# Patient Record
Sex: MALE | Race: WHITE | NOT HISPANIC OR LATINO
[De-identification: names, ages, dates, MRNs, and addresses within clinical notes are randomized per-mention and may not be internally consistent; named-entity substitution may affect disease eponyms.]

---

## 2017-02-13 ENCOUNTER — APPOINTMENT (OUTPATIENT)
Dept: FAMILY MEDICINE | Facility: CLINIC | Age: 82
End: 2017-02-13

## 2017-02-13 VITALS
DIASTOLIC BLOOD PRESSURE: 70 MMHG | BODY MASS INDEX: 25.71 KG/M2 | WEIGHT: 160 LBS | HEART RATE: 76 BPM | OXYGEN SATURATION: 96 % | SYSTOLIC BLOOD PRESSURE: 120 MMHG | HEIGHT: 66 IN | RESPIRATION RATE: 14 BRPM

## 2017-02-14 LAB
ALBUMIN SERPL ELPH-MCNC: 3.7 G/DL
ALP BLD-CCNC: 106 U/L
ALT SERPL-CCNC: 21 U/L
ANION GAP SERPL CALC-SCNC: 11 MMOL/L
AST SERPL-CCNC: 21 U/L
BASOPHILS # BLD AUTO: 0.02 K/UL
BASOPHILS NFR BLD AUTO: 0.3 %
BILIRUB SERPL-MCNC: 0.6 MG/DL
BUN SERPL-MCNC: 20 MG/DL
CALCIUM SERPL-MCNC: 10.1 MG/DL
CHLORIDE SERPL-SCNC: 107 MMOL/L
CHOLEST SERPL-MCNC: 94 MG/DL
CHOLEST/HDLC SERPL: 2 RATIO
CO2 SERPL-SCNC: 24 MMOL/L
CREAT SERPL-MCNC: 1.65 MG/DL
EOSINOPHIL # BLD AUTO: 0.13 K/UL
EOSINOPHIL NFR BLD AUTO: 1.7 %
GLUCOSE SERPL-MCNC: 84 MG/DL
HCT VFR BLD CALC: 39.6 %
HDLC SERPL-MCNC: 48 MG/DL
HGB BLD-MCNC: 12.3 G/DL
IMM GRANULOCYTES NFR BLD AUTO: 0.3 %
LDLC SERPL CALC-MCNC: 31 MG/DL
LYMPHOCYTES # BLD AUTO: 1.89 K/UL
LYMPHOCYTES NFR BLD AUTO: 24.5 %
MAN DIFF?: NORMAL
MCHC RBC-ENTMCNC: 30.1 PG
MCHC RBC-ENTMCNC: 31.1 GM/DL
MCV RBC AUTO: 97.1 FL
MONOCYTES # BLD AUTO: 0.8 K/UL
MONOCYTES NFR BLD AUTO: 10.4 %
NEUTROPHILS # BLD AUTO: 4.86 K/UL
NEUTROPHILS NFR BLD AUTO: 62.8 %
PLATELET # BLD AUTO: 242 K/UL
POTASSIUM SERPL-SCNC: 4.4 MMOL/L
PROT SERPL-MCNC: 5.9 G/DL
RBC # BLD: 4.08 M/UL
RBC # FLD: 13.2 %
SODIUM SERPL-SCNC: 142 MMOL/L
TRIGL SERPL-MCNC: 74 MG/DL
WBC # FLD AUTO: 7.72 K/UL

## 2017-02-15 LAB — HBA1C MFR BLD HPLC: 5.5 %

## 2017-03-09 ENCOUNTER — RX RENEWAL (OUTPATIENT)
Age: 82
End: 2017-03-09

## 2017-03-11 ENCOUNTER — RX RENEWAL (OUTPATIENT)
Age: 82
End: 2017-03-11

## 2017-04-26 ENCOUNTER — RX RENEWAL (OUTPATIENT)
Age: 82
End: 2017-04-26

## 2017-05-15 ENCOUNTER — APPOINTMENT (OUTPATIENT)
Dept: FAMILY MEDICINE | Facility: CLINIC | Age: 82
End: 2017-05-15

## 2017-05-15 VITALS
RESPIRATION RATE: 16 BRPM | WEIGHT: 160 LBS | SYSTOLIC BLOOD PRESSURE: 120 MMHG | DIASTOLIC BLOOD PRESSURE: 70 MMHG | TEMPERATURE: 98.3 F | HEIGHT: 66 IN | HEART RATE: 64 BPM | OXYGEN SATURATION: 98 % | BODY MASS INDEX: 25.71 KG/M2

## 2017-05-15 DIAGNOSIS — Z87.891 PERSONAL HISTORY OF NICOTINE DEPENDENCE: ICD-10-CM

## 2017-06-07 ENCOUNTER — RX RENEWAL (OUTPATIENT)
Age: 82
End: 2017-06-07

## 2017-06-26 ENCOUNTER — RX RENEWAL (OUTPATIENT)
Age: 82
End: 2017-06-26

## 2017-08-21 ENCOUNTER — APPOINTMENT (OUTPATIENT)
Dept: FAMILY MEDICINE | Facility: CLINIC | Age: 82
End: 2017-08-21
Payer: MEDICARE

## 2017-08-21 VITALS
BODY MASS INDEX: 24.59 KG/M2 | HEART RATE: 66 BPM | HEIGHT: 66 IN | DIASTOLIC BLOOD PRESSURE: 70 MMHG | WEIGHT: 153 LBS | OXYGEN SATURATION: 98 % | SYSTOLIC BLOOD PRESSURE: 120 MMHG | RESPIRATION RATE: 14 BRPM

## 2017-08-21 PROCEDURE — 36415 COLL VENOUS BLD VENIPUNCTURE: CPT

## 2017-08-21 PROCEDURE — 99214 OFFICE O/P EST MOD 30 MIN: CPT | Mod: 25

## 2017-08-22 LAB
ALBUMIN SERPL ELPH-MCNC: 4.4 G/DL
ALP BLD-CCNC: 110 U/L
ALT SERPL-CCNC: 13 U/L
ANION GAP SERPL CALC-SCNC: 14 MMOL/L
APPEARANCE: CLEAR
AST SERPL-CCNC: 18 U/L
BACTERIA: NEGATIVE
BASOPHILS # BLD AUTO: 0.02 K/UL
BASOPHILS NFR BLD AUTO: 0.3 %
BILIRUB SERPL-MCNC: 0.6 MG/DL
BILIRUBIN URINE: NEGATIVE
BLOOD URINE: NEGATIVE
BUN SERPL-MCNC: 20 MG/DL
CALCIUM SERPL-MCNC: 10.5 MG/DL
CALCIUM SERPL-MCNC: 10.5 MG/DL
CHLORIDE SERPL-SCNC: 103 MMOL/L
CHOLEST SERPL-MCNC: 104 MG/DL
CHOLEST/HDLC SERPL: 1.8 RATIO
CK SERPL-CCNC: 35 U/L
CO2 SERPL-SCNC: 24 MMOL/L
COLOR: ABNORMAL
CREAT SERPL-MCNC: 1.93 MG/DL
CREAT SPEC-SCNC: 176 MG/DL
EOSINOPHIL # BLD AUTO: 0.07 K/UL
EOSINOPHIL NFR BLD AUTO: 0.9 %
GLUCOSE QUALITATIVE U: NORMAL MG/DL
GLUCOSE SERPL-MCNC: 97 MG/DL
HBA1C MFR BLD HPLC: 5.2 %
HCT VFR BLD CALC: 38.1 %
HDLC SERPL-MCNC: 57 MG/DL
HGB BLD-MCNC: 12.5 G/DL
HYALINE CASTS: 1 /LPF
IMM GRANULOCYTES NFR BLD AUTO: 0.1 %
KETONES URINE: NEGATIVE
LDLC SERPL CALC-MCNC: 29 MG/DL
LEUKOCYTE ESTERASE URINE: ABNORMAL
LYMPHOCYTES # BLD AUTO: 1.6 K/UL
LYMPHOCYTES NFR BLD AUTO: 20.6 %
MAN DIFF?: NORMAL
MCHC RBC-ENTMCNC: 31.3 PG
MCHC RBC-ENTMCNC: 32.8 GM/DL
MCV RBC AUTO: 95.5 FL
MICROALBUMIN 24H UR DL<=1MG/L-MCNC: 2 MG/DL
MICROALBUMIN/CREAT 24H UR-RTO: 11 MG/G
MICROSCOPIC-UA: NORMAL
MONOCYTES # BLD AUTO: 0.85 K/UL
MONOCYTES NFR BLD AUTO: 11 %
NEUTROPHILS # BLD AUTO: 5.2 K/UL
NEUTROPHILS NFR BLD AUTO: 67.1 %
NITRITE URINE: NEGATIVE
PARATHYROID HORMONE INTACT: 131 PG/ML
PH URINE: 6.5
PLATELET # BLD AUTO: 230 K/UL
POTASSIUM SERPL-SCNC: 4.3 MMOL/L
PROT SERPL-MCNC: 6.7 G/DL
PROTEIN URINE: NEGATIVE MG/DL
RBC # BLD: 3.99 M/UL
RBC # FLD: 12.8 %
RED BLOOD CELLS URINE: 4 /HPF
SODIUM SERPL-SCNC: 141 MMOL/L
SPECIFIC GRAVITY URINE: 1.02
SQUAMOUS EPITHELIAL CELLS: 1 /HPF
TRIGL SERPL-MCNC: 91 MG/DL
UROBILINOGEN URINE: NORMAL MG/DL
WBC # FLD AUTO: 7.75 K/UL
WHITE BLOOD CELLS URINE: 4 /HPF

## 2017-08-23 ENCOUNTER — RX RENEWAL (OUTPATIENT)
Age: 82
End: 2017-08-23

## 2017-08-23 LAB
T4 FREE SERPL-MCNC: 0.9 NG/DL
TSH SERPL-ACNC: 1.96 UIU/ML

## 2017-10-11 ENCOUNTER — APPOINTMENT (OUTPATIENT)
Dept: FAMILY MEDICINE | Facility: CLINIC | Age: 82
End: 2017-10-11
Payer: MEDICARE

## 2017-10-11 VITALS — RESPIRATION RATE: 16 BRPM

## 2017-10-11 VITALS
WEIGHT: 152 LBS | DIASTOLIC BLOOD PRESSURE: 64 MMHG | SYSTOLIC BLOOD PRESSURE: 104 MMHG | OXYGEN SATURATION: 95 % | BODY MASS INDEX: 25.95 KG/M2 | HEIGHT: 64 IN | HEART RATE: 66 BPM

## 2017-10-11 DIAGNOSIS — Z23 ENCOUNTER FOR IMMUNIZATION: ICD-10-CM

## 2017-10-11 PROCEDURE — G0008: CPT

## 2017-10-11 PROCEDURE — 90688 IIV4 VACCINE SPLT 0.5 ML IM: CPT

## 2017-10-11 PROCEDURE — 99213 OFFICE O/P EST LOW 20 MIN: CPT | Mod: 25

## 2018-01-10 ENCOUNTER — RX RENEWAL (OUTPATIENT)
Age: 83
End: 2018-01-10

## 2018-01-19 ENCOUNTER — APPOINTMENT (OUTPATIENT)
Dept: FAMILY MEDICINE | Facility: CLINIC | Age: 83
End: 2018-01-19
Payer: MEDICARE

## 2018-01-19 VITALS
HEIGHT: 64 IN | DIASTOLIC BLOOD PRESSURE: 68 MMHG | OXYGEN SATURATION: 97 % | WEIGHT: 151 LBS | HEART RATE: 31 BPM | BODY MASS INDEX: 25.78 KG/M2 | SYSTOLIC BLOOD PRESSURE: 98 MMHG

## 2018-01-19 VITALS — RESPIRATION RATE: 14 BRPM

## 2018-01-19 PROCEDURE — 99214 OFFICE O/P EST MOD 30 MIN: CPT

## 2018-02-08 ENCOUNTER — RX RENEWAL (OUTPATIENT)
Age: 83
End: 2018-02-08

## 2018-02-25 ENCOUNTER — RX RENEWAL (OUTPATIENT)
Age: 83
End: 2018-02-25

## 2018-04-10 ENCOUNTER — MEDICATION RENEWAL (OUTPATIENT)
Age: 83
End: 2018-04-10

## 2018-04-18 ENCOUNTER — APPOINTMENT (OUTPATIENT)
Dept: FAMILY MEDICINE | Facility: CLINIC | Age: 83
End: 2018-04-18
Payer: MEDICARE

## 2018-04-18 VITALS
DIASTOLIC BLOOD PRESSURE: 70 MMHG | RESPIRATION RATE: 14 BRPM | SYSTOLIC BLOOD PRESSURE: 122 MMHG | WEIGHT: 151 LBS | HEART RATE: 70 BPM | HEIGHT: 64 IN | OXYGEN SATURATION: 97 % | BODY MASS INDEX: 25.78 KG/M2

## 2018-04-18 PROCEDURE — 99214 OFFICE O/P EST MOD 30 MIN: CPT

## 2018-05-30 ENCOUNTER — MEDICATION RENEWAL (OUTPATIENT)
Age: 83
End: 2018-05-30

## 2018-06-21 ENCOUNTER — RX RENEWAL (OUTPATIENT)
Age: 83
End: 2018-06-21

## 2018-07-11 ENCOUNTER — RX RENEWAL (OUTPATIENT)
Age: 83
End: 2018-07-11

## 2018-07-23 ENCOUNTER — APPOINTMENT (OUTPATIENT)
Dept: FAMILY MEDICINE | Facility: CLINIC | Age: 83
End: 2018-07-23
Payer: MEDICARE

## 2018-07-23 VITALS
DIASTOLIC BLOOD PRESSURE: 58 MMHG | HEART RATE: 69 BPM | BODY MASS INDEX: 25.27 KG/M2 | OXYGEN SATURATION: 94 % | HEIGHT: 64 IN | WEIGHT: 148 LBS | TEMPERATURE: 98.6 F | SYSTOLIC BLOOD PRESSURE: 112 MMHG

## 2018-07-23 VITALS — RESPIRATION RATE: 14 BRPM

## 2018-07-23 PROCEDURE — 99214 OFFICE O/P EST MOD 30 MIN: CPT

## 2018-07-23 NOTE — HISTORY OF PRESENT ILLNESS
[Hyperlipidemia] : Hyperlipidemia [Hypertension] : Hypertension [Other: ___] : [unfilled] [Spouse] : spouse [Family Member] : family member [Checks BP Sporadically] : The patient checks ~his/her~ blood pressure sporadically [<130/90] : Target blood pressure is  <130/90 [Target goal met] : BP target goal met [Doing Well] : Patient is doing well [Managed with medications] : managed with  medication [Moderate Intensity Therapy] : Patient is currently on moderate intensity statin  therapy [FreeTextEntry1] : Alzheimer no change continue present medical management, stable

## 2018-07-23 NOTE — ASSESSMENT
[FreeTextEntry1] : No change in present medical management. Detailed discussion with patient's wife and daughter. Unfortunately, the patient is not responsible for the things that he might or might not do.

## 2018-07-23 NOTE — PHYSICAL EXAM

## 2018-07-25 ENCOUNTER — RX RENEWAL (OUTPATIENT)
Age: 83
End: 2018-07-25

## 2018-10-22 ENCOUNTER — APPOINTMENT (OUTPATIENT)
Dept: FAMILY MEDICINE | Facility: CLINIC | Age: 83
End: 2018-10-22
Payer: MEDICARE

## 2018-10-22 VITALS
HEART RATE: 58 BPM | OXYGEN SATURATION: 98 % | DIASTOLIC BLOOD PRESSURE: 68 MMHG | SYSTOLIC BLOOD PRESSURE: 118 MMHG | TEMPERATURE: 97.6 F

## 2018-10-22 DIAGNOSIS — E83.52 HYPERCALCEMIA: ICD-10-CM

## 2018-10-22 DIAGNOSIS — Z00.00 ENCOUNTER FOR GENERAL ADULT MEDICAL EXAMINATION W/OUT ABNORMAL FINDINGS: ICD-10-CM

## 2018-10-22 PROCEDURE — 99214 OFFICE O/P EST MOD 30 MIN: CPT | Mod: 25

## 2018-10-22 PROCEDURE — 90732 PPSV23 VACC 2 YRS+ SUBQ/IM: CPT

## 2018-10-22 PROCEDURE — 90472 IMMUNIZATION ADMIN EACH ADD: CPT

## 2018-10-22 PROCEDURE — G0009: CPT

## 2018-10-22 PROCEDURE — 90688 IIV4 VACCINE SPLT 0.5 ML IM: CPT

## 2018-10-22 PROCEDURE — G0008: CPT

## 2018-10-22 NOTE — HISTORY OF PRESENT ILLNESS
[Spouse] : spouse [Family Member] : family member [FreeTextEntry1] : See history of present illness [de-identified] : Patient is a 89-year-old gentleman accompanied by his wife and daughter would advance in Alzheimer's. Patient in no acute distress. Medical history significant for hyperlipidemia, hypertension hypercalcemia, mild hyperparathyroidism, and hyperglycemia. Patient is in no acute distress. Awake, alert, and oriented x1-2

## 2018-10-22 NOTE — PHYSICAL EXAM

## 2018-10-22 NOTE — ASSESSMENT
[FreeTextEntry1] : Comprehensive physical exam was stable. No acute findings, comprehensive blood work obtained, influenza and Pneumovax 23, were administered.\par \par Advanced Alzheimer's. We will continue present medications and guidance from family.\par \par No change in present medical management face-to-face with the patien t25 minutes

## 2018-10-23 LAB
ALBUMIN SERPL ELPH-MCNC: 4.2 G/DL
ALP BLD-CCNC: 110 U/L
ALT SERPL-CCNC: 16 U/L
ANION GAP SERPL CALC-SCNC: 12 MMOL/L
AST SERPL-CCNC: 20 U/L
BASOPHILS # BLD AUTO: 0.01 K/UL
BASOPHILS NFR BLD AUTO: 0.1 %
BILIRUB SERPL-MCNC: 0.5 MG/DL
BUN SERPL-MCNC: 24 MG/DL
CALCIUM SERPL-MCNC: 10.3 MG/DL
CALCIUM SERPL-MCNC: 10.3 MG/DL
CHLORIDE SERPL-SCNC: 104 MMOL/L
CHOLEST SERPL-MCNC: 98 MG/DL
CHOLEST/HDLC SERPL: 1.8 RATIO
CK SERPL-CCNC: 38 U/L
CO2 SERPL-SCNC: 25 MMOL/L
CREAT SERPL-MCNC: 2 MG/DL
EOSINOPHIL # BLD AUTO: 0.09 K/UL
EOSINOPHIL NFR BLD AUTO: 1.3 %
GLUCOSE SERPL-MCNC: 74 MG/DL
HCT VFR BLD CALC: 38.1 %
HDLC SERPL-MCNC: 56 MG/DL
HGB BLD-MCNC: 11.9 G/DL
IMM GRANULOCYTES NFR BLD AUTO: 0.1 %
LDLC SERPL CALC-MCNC: 26 MG/DL
LYMPHOCYTES # BLD AUTO: 1.8 K/UL
LYMPHOCYTES NFR BLD AUTO: 25 %
MAN DIFF?: NORMAL
MCHC RBC-ENTMCNC: 30.7 PG
MCHC RBC-ENTMCNC: 31.2 GM/DL
MCV RBC AUTO: 98.2 FL
MONOCYTES # BLD AUTO: 0.73 K/UL
MONOCYTES NFR BLD AUTO: 10.1 %
NEUTROPHILS # BLD AUTO: 4.56 K/UL
NEUTROPHILS NFR BLD AUTO: 63.4 %
PARATHYROID HORMONE INTACT: 210 PG/ML
PLATELET # BLD AUTO: 243 K/UL
POTASSIUM SERPL-SCNC: 4.7 MMOL/L
PROT SERPL-MCNC: 6.5 G/DL
PSA SERPL-MCNC: 2.36 NG/ML
RBC # BLD: 3.88 M/UL
RBC # FLD: 13.3 %
SODIUM SERPL-SCNC: 141 MMOL/L
TRIGL SERPL-MCNC: 78 MG/DL
WBC # FLD AUTO: 7.2 K/UL

## 2018-10-24 LAB
HBA1C MFR BLD HPLC: 5.4 %
HCV AB SER QL: NONREACTIVE
HCV S/CO RATIO: 0.22 S/CO

## 2018-11-20 ENCOUNTER — RX RENEWAL (OUTPATIENT)
Age: 83
End: 2018-11-20

## 2018-12-20 ENCOUNTER — RX RENEWAL (OUTPATIENT)
Age: 83
End: 2018-12-20

## 2019-01-18 ENCOUNTER — APPOINTMENT (OUTPATIENT)
Dept: FAMILY MEDICINE | Facility: CLINIC | Age: 84
End: 2019-01-18
Payer: MEDICARE

## 2019-01-18 VITALS
HEART RATE: 69 BPM | BODY MASS INDEX: 25.61 KG/M2 | HEIGHT: 64 IN | SYSTOLIC BLOOD PRESSURE: 120 MMHG | WEIGHT: 150 LBS | DIASTOLIC BLOOD PRESSURE: 74 MMHG | OXYGEN SATURATION: 96 %

## 2019-01-18 VITALS — RESPIRATION RATE: 16 BRPM

## 2019-01-18 DIAGNOSIS — R73.9 HYPERGLYCEMIA, UNSPECIFIED: ICD-10-CM

## 2019-01-18 PROCEDURE — 99215 OFFICE O/P EST HI 40 MIN: CPT

## 2019-01-18 NOTE — COUNSELING
[Behavioral health counseling provided] : behavioral health  [Sleep ___ hours/day] : Sleep [unfilled] hours/day [Engage in a relaxing activity] : Engage in a relaxing activity [Plan in advance] : Plan in advance [None] : None [Needs reinforcement, provided] : Patient needs reinforcement on understanding lifestyle changes and  the steps needed to achieve self management goals and reinforcement was provided

## 2019-01-18 NOTE — PHYSICAL EXAM
[No Acute Distress] : no acute distress [Well Nourished] : well nourished [Well Developed] : well developed [Well-Appearing] : well-appearing [Normal Sclera/Conjunctiva] : normal sclera/conjunctiva [PERRL] : pupils equal round and reactive to light [EOMI] : extraocular movements intact [Normal Outer Ear/Nose] : the outer ears and nose were normal in appearance [Normal Oropharynx] : the oropharynx was normal [No JVD] : no jugular venous distention [Supple] : supple [No Lymphadenopathy] : no lymphadenopathy [Thyroid Normal, No Nodules] : the thyroid was normal and there were no nodules present [No Respiratory Distress] : no respiratory distress  [Clear to Auscultation] : lungs were clear to auscultation bilaterally [No Accessory Muscle Use] : no accessory muscle use [Normal Rate] : normal rate  [Regular Rhythm] : with a regular rhythm [Normal S1, S2] : normal S1 and S2 [No Murmur] : no murmur heard [No Carotid Bruits] : no carotid bruits [No Abdominal Bruit] : a ~M bruit was not heard ~T in the abdomen [No Varicosities] : no varicosities [Pedal Pulses Present] : the pedal pulses are present [No Edema] : there was no peripheral edema [No Extremity Clubbing/Cyanosis] : no extremity clubbing/cyanosis [No Palpable Aorta] : no palpable aorta [Soft] : abdomen soft [Non Tender] : non-tender [Non-distended] : non-distended [No Masses] : no abdominal mass palpated [No HSM] : no HSM [Normal Bowel Sounds] : normal bowel sounds [Normal Posterior Cervical Nodes] : no posterior cervical lymphadenopathy [Normal Anterior Cervical Nodes] : no anterior cervical lymphadenopathy [No CVA Tenderness] : no CVA  tenderness [No Spinal Tenderness] : no spinal tenderness [No Joint Swelling] : no joint swelling [Grossly Normal Strength/Tone] : grossly normal strength/tone [No Rash] : no rash [Normal Gait] : normal gait [No Focal Deficits] : no focal deficits [de-identified] : Shuffling gait, expressionless [de-identified] : Fine tremor. No cogwheel rigidity. No pill-rolling,Unsteady gait [de-identified] : advanced Alzheimer,no change

## 2019-01-18 NOTE — HEALTH RISK ASSESSMENT
[Patient declined bone density test] : Patient declined bone density test [Patient declined colonoscopy] : Patient declined colonoscopy [HIV test declined] : HIV test declined [Independent] : feeding [Some assistance needed] : walking [Full assistance needed] : managing finances [] : No [Two or more falls in past year] : Patient reported two or more falls in the past year [0] : 2) Feeling down, depressed, or hopeless: Not at all (0) [[id number]] : 0

## 2019-01-18 NOTE — REVIEW OF SYSTEMS
[Fatigue] : fatigue [Headache] : no headache [Dizziness] : no dizziness [Fainting] : no fainting [Confusion] : confusion [Unsteady Walk] : ataxia [Memory Loss] : memory loss [Negative] : Heme/Lymph

## 2019-01-18 NOTE — HISTORY OF PRESENT ILLNESS
[Spouse] : spouse [Family Member] : family member [FreeTextEntry1] : see HPI [de-identified] : Patient is a 89-year-old gentleman presents today for followup appointment. Patient accompanied by his daughter and spouse. Patient has advanced Alzheimer's disease. He is in no acute distress at this time. Medical history is significant for hyperlipidemia Alzheimer's, generalized anxiety, hypertension, hyperglycemia, and hyperparathyroidism. Patient also has chronic renal insufficiency. Patient is awake, alert, and oriented x1, presently in no acute distress. He is calm, cooperative, and no distress.

## 2019-01-18 NOTE — ASSESSMENT
[FreeTextEntry1] : Comprehensive physical exam was stable.Status post fall at home. No injury. Patient does have unsteady gait, shuffling gait has worsened. Patient does have  Alzheimer's and possibly with the new findings. Parkinsonism. I will hold back on starting any new medications. We will observe patient how he progresses. Discussed with family.\par \par Advanced Alzheimer's. We will continue present medications and guidance from family.\par \par Hypertension excellent blood pressure control. Continue metoprolol, heart rate 50 mg p.o. daily.\par \par Hyperlipidemia. Patient tolerating and doing well with atorvastatin 10 mg p.o. daily.\par \par Parkinsonism. Consider Sinemet\par \par MOLST filled out at today's visit 40 minutes face to face with patient and family.

## 2019-02-04 ENCOUNTER — RX RENEWAL (OUTPATIENT)
Age: 84
End: 2019-02-04

## 2019-02-18 ENCOUNTER — RX RENEWAL (OUTPATIENT)
Age: 84
End: 2019-02-18

## 2019-04-02 ENCOUNTER — APPOINTMENT (OUTPATIENT)
Dept: FAMILY MEDICINE | Facility: HOME HEALTH | Age: 84
End: 2019-04-02
Payer: MEDICARE

## 2019-04-02 PROCEDURE — 99349 HOME/RES VST EST MOD MDM 40: CPT

## 2019-04-09 ENCOUNTER — MEDICATION RENEWAL (OUTPATIENT)
Age: 84
End: 2019-04-09

## 2019-04-23 ENCOUNTER — MEDICATION RENEWAL (OUTPATIENT)
Age: 84
End: 2019-04-23

## 2019-04-26 ENCOUNTER — APPOINTMENT (OUTPATIENT)
Dept: FAMILY MEDICINE | Facility: CLINIC | Age: 84
End: 2019-04-26

## 2019-05-16 ENCOUNTER — INPATIENT (INPATIENT)
Facility: HOSPITAL | Age: 84
LOS: 7 days | Discharge: HOME | DRG: 871 | End: 2019-05-24
Attending: INTERNAL MEDICINE | Admitting: INTERNAL MEDICINE
Payer: COMMERCIAL

## 2019-05-16 VITALS
WEIGHT: 160.06 LBS | SYSTOLIC BLOOD PRESSURE: 142 MMHG | OXYGEN SATURATION: 81 % | HEIGHT: 65 IN | RESPIRATION RATE: 24 BRPM | DIASTOLIC BLOOD PRESSURE: 81 MMHG | TEMPERATURE: 98 F | HEART RATE: 85 BPM

## 2019-05-16 DIAGNOSIS — J18.9 PNEUMONIA, UNSPECIFIED ORGANISM: ICD-10-CM

## 2019-05-16 DIAGNOSIS — A41.9 SEPSIS, UNSPECIFIED ORGANISM: ICD-10-CM

## 2019-05-16 DIAGNOSIS — F03.90 UNSPECIFIED DEMENTIA WITHOUT BEHAVIORAL DISTURBANCE: ICD-10-CM

## 2019-05-16 DIAGNOSIS — S02.91XA UNSPECIFIED FRACTURE OF SKULL, INITIAL ENCOUNTER FOR CLOSED FRACTURE: ICD-10-CM

## 2019-05-16 DIAGNOSIS — I10 ESSENTIAL (PRIMARY) HYPERTENSION: ICD-10-CM

## 2019-05-16 LAB
ALBUMIN SERPL ELPH-MCNC: 3.4 G/DL — SIGNIFICANT CHANGE UP (ref 3.3–5)
ALP SERPL-CCNC: 135 U/L — HIGH (ref 40–120)
ALT FLD-CCNC: 32 U/L DA — SIGNIFICANT CHANGE UP (ref 10–45)
ANION GAP SERPL CALC-SCNC: 17 MMOL/L — SIGNIFICANT CHANGE UP (ref 5–17)
AST SERPL-CCNC: 54 U/L — HIGH (ref 10–40)
BILIRUB SERPL-MCNC: 0.6 MG/DL — SIGNIFICANT CHANGE UP (ref 0.2–1.2)
BUN SERPL-MCNC: 27 MG/DL — HIGH (ref 7–23)
CALCIUM SERPL-MCNC: 10 MG/DL — SIGNIFICANT CHANGE UP (ref 8.4–10.5)
CHLORIDE SERPL-SCNC: 103 MMOL/L — SIGNIFICANT CHANGE UP (ref 96–108)
CO2 BLDA-SCNC: 14 MMOL/L — LOW (ref 22–30)
CO2 SERPL-SCNC: 19 MMOL/L — LOW (ref 22–31)
CREAT SERPL-MCNC: 2.57 MG/DL — HIGH (ref 0.5–1.3)
GAS PNL BLDA: SIGNIFICANT CHANGE UP
GLUCOSE SERPL-MCNC: 179 MG/DL — HIGH (ref 70–99)
HCT VFR BLD CALC: 47.4 % — SIGNIFICANT CHANGE UP (ref 39–50)
HGB BLD-MCNC: 14.6 G/DL — SIGNIFICANT CHANGE UP (ref 13–17)
HOROWITZ INDEX BLDA+IHG-RTO: SIGNIFICANT CHANGE UP
LACTATE SERPL-SCNC: 10.1 MMOL/L — CRITICAL HIGH (ref 0.7–2)
LACTATE SERPL-SCNC: 7.1 MMOL/L — CRITICAL HIGH (ref 0.7–2)
LYMPHOCYTES # BLD AUTO: 2 % — LOW (ref 13–44)
MCHC RBC-ENTMCNC: 30.8 GM/DL — LOW (ref 32–36)
MCHC RBC-ENTMCNC: 31.3 PG — SIGNIFICANT CHANGE UP (ref 27–34)
MCV RBC AUTO: 101.5 FL — HIGH (ref 80–100)
MONOCYTES NFR BLD AUTO: 2 % — SIGNIFICANT CHANGE UP (ref 2–14)
NEUTROPHILS NFR BLD AUTO: 82 % — HIGH (ref 43–77)
PCO2 BLDA: 31 MMHG — LOW (ref 32–46)
PH BLDA: 7.25 — LOW (ref 7.35–7.45)
PLAT MORPH BLD: SIGNIFICANT CHANGE UP
PLATELET # BLD AUTO: 250 K/UL — SIGNIFICANT CHANGE UP (ref 150–400)
PO2 BLDA: 52 MMHG — LOW (ref 74–108)
POTASSIUM SERPL-MCNC: 4.6 MMOL/L — SIGNIFICANT CHANGE UP (ref 3.5–5.3)
POTASSIUM SERPL-SCNC: 4.6 MMOL/L — SIGNIFICANT CHANGE UP (ref 3.5–5.3)
PROT SERPL-MCNC: 6.9 G/DL — SIGNIFICANT CHANGE UP (ref 6–8.3)
RBC # BLD: 4.67 M/UL — SIGNIFICANT CHANGE UP (ref 4.2–5.8)
RBC # FLD: 12.4 % — SIGNIFICANT CHANGE UP (ref 10.3–14.5)
RBC BLD AUTO: NORMAL — SIGNIFICANT CHANGE UP
SAO2 % BLDA: 80 % — LOW (ref 92–96)
SODIUM SERPL-SCNC: 139 MMOL/L — SIGNIFICANT CHANGE UP (ref 135–145)
TROPONIN I SERPL-MCNC: 0.04 NG/ML — SIGNIFICANT CHANGE UP (ref 0.02–0.06)
WBC # BLD: 9.42 K/UL — SIGNIFICANT CHANGE UP (ref 3.8–10.5)
WBC # FLD AUTO: 9.42 K/UL — SIGNIFICANT CHANGE UP (ref 3.8–10.5)

## 2019-05-16 PROCEDURE — 99291 CRITICAL CARE FIRST HOUR: CPT

## 2019-05-16 PROCEDURE — 74018 RADEX ABDOMEN 1 VIEW: CPT | Mod: 26

## 2019-05-16 PROCEDURE — 93010 ELECTROCARDIOGRAM REPORT: CPT

## 2019-05-16 PROCEDURE — 99223 1ST HOSP IP/OBS HIGH 75: CPT

## 2019-05-16 PROCEDURE — 71045 X-RAY EXAM CHEST 1 VIEW: CPT | Mod: 26

## 2019-05-16 RX ORDER — METOPROLOL TARTRATE 50 MG
50 TABLET ORAL DAILY
Refills: 0 | Status: DISCONTINUED | OUTPATIENT
Start: 2019-05-16 | End: 2019-05-16

## 2019-05-16 RX ORDER — TRIAMTERENE/HYDROCHLOROTHIAZID 75 MG-50MG
1 TABLET ORAL DAILY
Refills: 0 | Status: DISCONTINUED | OUTPATIENT
Start: 2019-05-16 | End: 2019-05-16

## 2019-05-16 RX ORDER — SODIUM CHLORIDE 9 MG/ML
2300 INJECTION INTRAMUSCULAR; INTRAVENOUS; SUBCUTANEOUS ONCE
Refills: 0 | Status: COMPLETED | OUTPATIENT
Start: 2019-05-16 | End: 2019-05-16

## 2019-05-16 RX ORDER — OLANZAPINE 15 MG/1
5 TABLET, FILM COATED ORAL DAILY
Refills: 0 | Status: DISCONTINUED | OUTPATIENT
Start: 2019-05-16 | End: 2019-05-24

## 2019-05-16 RX ORDER — MIRTAZAPINE 45 MG/1
45 TABLET, ORALLY DISINTEGRATING ORAL AT BEDTIME
Refills: 0 | Status: DISCONTINUED | OUTPATIENT
Start: 2019-05-16 | End: 2019-05-24

## 2019-05-16 RX ORDER — ATORVASTATIN CALCIUM 80 MG/1
10 TABLET, FILM COATED ORAL AT BEDTIME
Refills: 0 | Status: DISCONTINUED | OUTPATIENT
Start: 2019-05-16 | End: 2019-05-24

## 2019-05-16 RX ORDER — CEFTRIAXONE 500 MG/1
1 INJECTION, POWDER, FOR SOLUTION INTRAMUSCULAR; INTRAVENOUS ONCE
Refills: 0 | Status: COMPLETED | OUTPATIENT
Start: 2019-05-16 | End: 2019-05-16

## 2019-05-16 RX ORDER — ATORVASTATIN CALCIUM 80 MG/1
10 TABLET, FILM COATED ORAL DAILY
Refills: 0 | Status: DISCONTINUED | OUTPATIENT
Start: 2019-05-16 | End: 2019-05-16

## 2019-05-16 RX ORDER — PIPERACILLIN AND TAZOBACTAM 4; .5 G/20ML; G/20ML
3.38 INJECTION, POWDER, LYOPHILIZED, FOR SOLUTION INTRAVENOUS EVERY 12 HOURS
Refills: 0 | Status: DISCONTINUED | OUTPATIENT
Start: 2019-05-16 | End: 2019-05-23

## 2019-05-16 RX ORDER — SODIUM CHLORIDE 9 MG/ML
1000 INJECTION, SOLUTION INTRAVENOUS
Refills: 0 | Status: DISCONTINUED | OUTPATIENT
Start: 2019-05-16 | End: 2019-05-19

## 2019-05-16 RX ORDER — AZITHROMYCIN 500 MG/1
500 TABLET, FILM COATED ORAL ONCE
Refills: 0 | Status: COMPLETED | OUTPATIENT
Start: 2019-05-16 | End: 2019-05-16

## 2019-05-16 RX ORDER — CITALOPRAM 10 MG/1
10 TABLET, FILM COATED ORAL DAILY
Refills: 0 | Status: DISCONTINUED | OUTPATIENT
Start: 2019-05-16 | End: 2019-05-16

## 2019-05-16 RX ORDER — ESCITALOPRAM OXALATE 10 MG/1
5 TABLET, FILM COATED ORAL DAILY
Refills: 0 | Status: DISCONTINUED | OUTPATIENT
Start: 2019-05-16 | End: 2019-05-24

## 2019-05-16 RX ORDER — MEMANTINE HYDROCHLORIDE 10 MG/1
10 TABLET ORAL
Refills: 0 | Status: DISCONTINUED | OUTPATIENT
Start: 2019-05-16 | End: 2019-05-24

## 2019-05-16 RX ORDER — ENOXAPARIN SODIUM 100 MG/ML
30 INJECTION SUBCUTANEOUS EVERY 24 HOURS
Refills: 0 | Status: DISCONTINUED | OUTPATIENT
Start: 2019-05-16 | End: 2019-05-24

## 2019-05-16 RX ADMIN — SODIUM CHLORIDE 2300 MILLILITER(S): 9 INJECTION INTRAMUSCULAR; INTRAVENOUS; SUBCUTANEOUS at 13:25

## 2019-05-16 RX ADMIN — MIRTAZAPINE 45 MILLIGRAM(S): 45 TABLET, ORALLY DISINTEGRATING ORAL at 21:16

## 2019-05-16 RX ADMIN — MEMANTINE HYDROCHLORIDE 10 MILLIGRAM(S): 10 TABLET ORAL at 18:29

## 2019-05-16 RX ADMIN — SODIUM CHLORIDE 75 MILLILITER(S): 9 INJECTION, SOLUTION INTRAVENOUS at 15:23

## 2019-05-16 RX ADMIN — AZITHROMYCIN 500 MILLIGRAM(S): 500 TABLET, FILM COATED ORAL at 15:19

## 2019-05-16 RX ADMIN — OLANZAPINE 5 MILLIGRAM(S): 15 TABLET, FILM COATED ORAL at 18:29

## 2019-05-16 RX ADMIN — PIPERACILLIN AND TAZOBACTAM 25 GRAM(S): 4; .5 INJECTION, POWDER, LYOPHILIZED, FOR SOLUTION INTRAVENOUS at 18:29

## 2019-05-16 RX ADMIN — ENOXAPARIN SODIUM 30 MILLIGRAM(S): 100 INJECTION SUBCUTANEOUS at 18:30

## 2019-05-16 RX ADMIN — ESCITALOPRAM OXALATE 5 MILLIGRAM(S): 10 TABLET, FILM COATED ORAL at 18:29

## 2019-05-16 RX ADMIN — ATORVASTATIN CALCIUM 10 MILLIGRAM(S): 80 TABLET, FILM COATED ORAL at 21:16

## 2019-05-16 RX ADMIN — CEFTRIAXONE 100 GRAM(S): 500 INJECTION, POWDER, FOR SOLUTION INTRAMUSCULAR; INTRAVENOUS at 13:28

## 2019-05-16 RX ADMIN — AZITHROMYCIN 255 MILLIGRAM(S): 500 TABLET, FILM COATED ORAL at 14:14

## 2019-05-16 RX ADMIN — CEFTRIAXONE 1 GRAM(S): 500 INJECTION, POWDER, FOR SOLUTION INTRAMUSCULAR; INTRAVENOUS at 14:13

## 2019-05-16 RX ADMIN — SODIUM CHLORIDE 2300 MILLILITER(S): 9 INJECTION INTRAMUSCULAR; INTRAVENOUS; SUBCUTANEOUS at 14:32

## 2019-05-16 NOTE — ED ADULT NURSE NOTE - OBJECTIVE STATEMENT
According to pt daughter, pt had diarrhea this am and was feeling weak.  He had a near syncopal episode, daughter called EMS.

## 2019-05-16 NOTE — CONSULT NOTE ADULT - SUBJECTIVE AND OBJECTIVE BOX
HPI:  89yo M w/PMH dementia (fell off roof >15yrs ago TBI), HTN, presented to ED from home with daughter c/o diarrhea patient incidentally found to have RLL PNA with respiratory distress requiring NIV and a lactate of 10.  Antibiotics initiated and patient given fluid replacement.  Patient is alert and oriented x2.  MOLST reviewed - patient is a DNR/DNI - plan to admit to  for IV anitibiotics awaiting repeat labs. (16 May 2019 15:33)  Palliative;  reviewed with patient's daughter and the ICU staff. We'll agree IV antibiotic and fluid trial. If no improvement will then consider hospice care.    PAST MEDICAL & SURGICAL HISTORY:  Anxiety  Depression  HTN (hypertension)  Hypercholesteremia  Skull fracture  Dementia      SOCIAL HISTORY:    Admitted from:  home   Substance abuse history: not applicable        Surrogate/HCP/Guardian:            Phone#:    FAMILY HISTORY:    Baseline ADLs (prior to admission): some ambulation. patient needed total assist with all ADLs    Allergies    No Known Allergies    Intolerances      Present Symptoms:     Fatigue: yes  Loss of appetite: yes  Pain:     abdominal severe                       Review of Systems:  Unable to obtain due to poor mentation]    MEDICATIONS  (STANDING):  atorvastatin Oral Tab/Cap - Peds 10 milliGRAM(s) Oral daily  citalopram 10 milliGRAM(s) Oral daily  lactated ringers. 1000 milliLiter(s) (75 mL/Hr) IV Continuous <Continuous>  memantine 10 milliGRAM(s) Oral two times a day  metoprolol tartrate Oral Tab/Cap - Peds 50 milliGRAM(s) Oral daily  mirtazapine 45 milliGRAM(s) Oral at bedtime  OLANZapine 5 milliGRAM(s) Oral daily  piperacillin/tazobactam IVPB. 3.375 Gram(s) IV Intermittent every 12 hours  triamterene 37.5 mG/hydrochlorothiazide 25 mG Capsule 1 Capsule(s) Oral daily    MEDICATIONS  (PRN):      PHYSICAL EXAM:    Vital Signs Last 24 Hrs  T(C): 36.7 (16 May 2019 16:23), Max: 37.6 (16 May 2019 12:45)  T(F): 98.1 (16 May 2019 16:23), Max: 99.7 (16 May 2019 12:45)  HR: 73 (16 May 2019 16:23) (73 - 88)  BP: 121/77 (16 May 2019 16:23) (117/70 - 160/87)  BP(mean): --  RR: 17 (16 May 2019 16:23) (17 - 33)  SpO2: 94% (16 May 2019 16:23) (81% - 99%)    General: distressed     HEENT: normal   Lungs: comfortable   GI:  distended  tender  incontinent             : incontinent   Musculoskeletal: weakness    bedbound  Skin: normal    Neuro:  cognitive impairment dsyphagia/dysarthria  Diet: [NPO]    LABS:                        14.6   9.42  )-----------( 250      ( 16 May 2019 12:52 )             47.4     05-16    139  |  103  |  27<H>  ----------------------------<  179<H>  4.6   |  19<L>  |  2.57<H>    Ca    10.0      16 May 2019 12:52    TPro  6.9  /  Alb  3.4  /  TBili  0.6  /  DBili  x   /  AST  54<H>  /  ALT  32  /  AlkPhos  135<H>  05-16        RADIOLOGY & ADDITIONAL STUDIES:    ADVANCE DIRECTIVES: jose enrique on chart  Advanced Care Planning discussion total time spent: 1/2 hr

## 2019-05-16 NOTE — ED ADULT TRIAGE NOTE - CHIEF COMPLAINT QUOTE
Pt BIB EMS from home with c/o diarrhea x3 this AM, weakness, tremor, and shortness of breath. Per EMS pt had rales and was given Lasix 40mg IVP PTA. Pt presents on CPAP.

## 2019-05-16 NOTE — ED PROVIDER NOTE - CRITICAL CARE PROVIDED
interpretation of diagnostic studies/consultation with other physicians/direct patient care (not related to procedure)/additional history taking/documentation/conducted a detailed discussion of DNR status/consult w/ pt's family directly relating to pts condition

## 2019-05-16 NOTE — ED PROVIDER NOTE - CLINICAL SUMMARY MEDICAL DECISION MAKING FREE TEXT BOX
Patient with sudden onset of sob , right pneumonia , lactate of 10. Septic shock . Discussion of goals of care /hospice. Will keep on bipap with antibiotics

## 2019-05-16 NOTE — ED ADULT NURSE NOTE - NSIMPLEMENTINTERV_GEN_ALL_ED
Implemented All Fall Risk Interventions:  [place] to call system. Call bell, personal items and telephone within reach. Instruct patient to call for assistance. Room bathroom lighting operational. Non-slip footwear when patient is off stretcher. Physically safe environment: no spills, clutter or unnecessary equipment. Stretcher in lowest position, wheels locked, appropriate side rails in place. Provide visual cue, wrist band, yellow gown, etc. Monitor gait and stability. Monitor for mental status changes and reorient to person, place, and time. Review medications for side effects contributing to fall risk. Reinforce activity limits and safety measures with patient and family.

## 2019-05-16 NOTE — PATIENT PROFILE ADULT - INDICATE TYPE
Health Care Proxy (HCP)/Do Not Resuscitate (DNR) Do Not Resuscitate (DNR)/Health Care Proxy (HCP)/Medical Orders for Life-Sustaining Treatment (MOLST)

## 2019-05-16 NOTE — CONSULT NOTE ADULT - ASSESSMENT
Palliative;This patient is septic with possible abdominal process going on. Also patient has pneumonia overall prognosis is poor. Family aware. IV trial of IV fluids and antibiotics if no improvement will consider inpatient or home hospice pending clinical course.

## 2019-05-16 NOTE — H&P ADULT - NSICDXPASTMEDICALHX_GEN_ALL_CORE_FT
PAST MEDICAL HISTORY:  Anxiety     Dementia     Depression     HTN (hypertension)     Hypercholesteremia     Skull fracture

## 2019-05-16 NOTE — H&P ADULT - HISTORY OF PRESENT ILLNESS
89yo M w/PMH dementia (fell off roof >15yrs ago TBI), HTN, presented to ED from home with daughter c/o diarrhea patient incidentally found to have RLL PNA with respiratory distress requiring NIV and a lactate of 10.  Antibiotics initiated and patient given fluid replacement.  Patient is alert and oriented x2.  MOLST reviewed and palliative care at bedside - patient is a DNR/DNI - plan to admit to  for IV anitibiotics awaiting repeat labs.

## 2019-05-16 NOTE — H&P ADULT - ASSESSMENT
89yo M w/pmh dementia, depression, htn admitted for respiratory distress admitted with PNA on NIV.  Patient is DNR/DNI admitted to  for trial of anitibiotics.

## 2019-05-16 NOTE — H&P ADULT - NSHPPHYSICALEXAM_GEN_ALL_CORE
PHYSICAL EXAM:      Constitutional:    Eyes:    ENMT:    Neck:    Breasts:    Back:    Respiratory:    Cardiovascular:    Gastrointestinal:    Genitourinary:    Rectal:    Extremities:    Vascular:    Neurological:    Skin:    Lymph Nodes:    Musculoskeletal:    Psychiatric: ICU Vital Signs Last 24 Hrs  T(C): 37.3 (16 May 2019 17:09), Max: 37.6 (16 May 2019 12:45)  T(F): 99.2 (16 May 2019 17:09), Max: 99.7 (16 May 2019 12:45)  HR: 72 (16 May 2019 17:09) (72 - 88)  BP: 124/73 (16 May 2019 17:09) (117/70 - 160/87)  BP(mean): --  ABP: --  ABP(mean): --  RR: 18 (16 May 2019 17:09) (17 - 33)  SpO2: 83% (16 May 2019 17:09) (81% - 99%)    Physical Examination:  GENERAL:               Alert, Confused acute distress.    HEENT:                    Pupils equal, reactive to light.  Symmetric. No JVD, Moist MM  PULM:                     Bilateral air entry, no significant sputum production, No Rales, Right Rhonchi, No Wheezing  CVS:                         S1, S2,  + Murmur  ABD:                        Soft, nondistended, nontender, normoactive bowel sounds,   EXT:                         No edema, nontender, No Cyanosis or Clubbing   Vascular:                Warm Extremities, Normal Capillary refill, Poor Distal Pulses  SKIN:                       Warm and well perfused, no rashes noted.   NEURO:                  Alert, oriented, interactive, nonfocal, follows commands  PSYC:                      Confused no Insight.

## 2019-05-16 NOTE — H&P ADULT - NSHPSOCIALHISTORY_GEN_ALL_CORE
Patient quit tobacco >30 yrs ago  Social ETOH in passed no alcohol recently    Lives at home with wife and care givers

## 2019-05-16 NOTE — H&P ADULT - ATTENDING COMMENTS
patient seen and examined   Case d/w DTR, Dr. Chester  pt appears to have Septic shock due to PNA with lactic acidosis.  family does not want intubation, heroic measures, cvp, pressors.     Assessment  Septic Shock due to right sided PNA with severe metabolic acidosis and lactic acidosis  Hypoxic respiratory failure  due to above requiring NIV    Dementia  CKD base line cr in 2s    Plan   admit to AI  IVF  IV abx  trend lactic , severely elevated,  will give maintenance fluids  check labs in am  decide on hospice at that time  GI/DVT ppx  d/w Dr. Cutler, D/w Dr. Chester

## 2019-05-16 NOTE — H&P ADULT - NSICDXFAMHXPERTINENTNEGATIVE_GEN_A_CORE_FT
No pertinent past family history - patient is a 1st generation immigrant from [facility] 50years ago.

## 2019-05-17 DIAGNOSIS — Z29.9 ENCOUNTER FOR PROPHYLACTIC MEASURES, UNSPECIFIED: ICD-10-CM

## 2019-05-17 DIAGNOSIS — F03.90 UNSPECIFIED DEMENTIA WITHOUT BEHAVIORAL DISTURBANCE: ICD-10-CM

## 2019-05-17 DIAGNOSIS — I10 ESSENTIAL (PRIMARY) HYPERTENSION: ICD-10-CM

## 2019-05-17 DIAGNOSIS — A41.9 SEPSIS, UNSPECIFIED ORGANISM: ICD-10-CM

## 2019-05-17 DIAGNOSIS — E78.00 PURE HYPERCHOLESTEROLEMIA, UNSPECIFIED: ICD-10-CM

## 2019-05-17 DIAGNOSIS — N18.9 CHRONIC KIDNEY DISEASE, UNSPECIFIED: ICD-10-CM

## 2019-05-17 LAB
ALBUMIN SERPL ELPH-MCNC: 2.4 G/DL — LOW (ref 3.3–5)
ALP SERPL-CCNC: 66 U/L — SIGNIFICANT CHANGE UP (ref 40–120)
ALT FLD-CCNC: 21 U/L DA — SIGNIFICANT CHANGE UP (ref 10–45)
ANION GAP SERPL CALC-SCNC: 10 MMOL/L — SIGNIFICANT CHANGE UP (ref 5–17)
AST SERPL-CCNC: 40 U/L — SIGNIFICANT CHANGE UP (ref 10–40)
BILIRUB SERPL-MCNC: 0.6 MG/DL — SIGNIFICANT CHANGE UP (ref 0.2–1.2)
BUN SERPL-MCNC: 25 MG/DL — HIGH (ref 7–23)
CALCIUM SERPL-MCNC: 8.9 MG/DL — SIGNIFICANT CHANGE UP (ref 8.4–10.5)
CHLORIDE SERPL-SCNC: 106 MMOL/L — SIGNIFICANT CHANGE UP (ref 96–108)
CO2 SERPL-SCNC: 23 MMOL/L — SIGNIFICANT CHANGE UP (ref 22–31)
CREAT SERPL-MCNC: 2.45 MG/DL — HIGH (ref 0.5–1.3)
CULTURE RESULTS: SIGNIFICANT CHANGE UP
GLUCOSE SERPL-MCNC: 81 MG/DL — SIGNIFICANT CHANGE UP (ref 70–99)
HCT VFR BLD CALC: 33.1 % — LOW (ref 39–50)
HGB BLD-MCNC: 10.9 G/DL — LOW (ref 13–17)
LACTATE SERPL-SCNC: 3.9 MMOL/L — HIGH (ref 0.7–2)
MAGNESIUM SERPL-MCNC: 1.3 MG/DL — LOW (ref 1.6–2.6)
MCHC RBC-ENTMCNC: 31.8 PG — SIGNIFICANT CHANGE UP (ref 27–34)
MCHC RBC-ENTMCNC: 32.9 GM/DL — SIGNIFICANT CHANGE UP (ref 32–36)
MCV RBC AUTO: 96.5 FL — SIGNIFICANT CHANGE UP (ref 80–100)
NRBC # BLD: 0 /100 WBCS — SIGNIFICANT CHANGE UP (ref 0–0)
PHOSPHATE SERPL-MCNC: 2.7 MG/DL — SIGNIFICANT CHANGE UP (ref 2.5–4.5)
PLATELET # BLD AUTO: 170 K/UL — SIGNIFICANT CHANGE UP (ref 150–400)
POTASSIUM SERPL-MCNC: 4.1 MMOL/L — SIGNIFICANT CHANGE UP (ref 3.5–5.3)
POTASSIUM SERPL-SCNC: 4.1 MMOL/L — SIGNIFICANT CHANGE UP (ref 3.5–5.3)
PROCALCITONIN SERPL-MCNC: >100 NG/ML — HIGH
PROT SERPL-MCNC: 5.1 G/DL — LOW (ref 6–8.3)
RBC # BLD: 3.43 M/UL — LOW (ref 4.2–5.8)
RBC # FLD: 12.6 % — SIGNIFICANT CHANGE UP (ref 10.3–14.5)
SODIUM SERPL-SCNC: 139 MMOL/L — SIGNIFICANT CHANGE UP (ref 135–145)
SPECIMEN SOURCE: SIGNIFICANT CHANGE UP
WBC # BLD: 21.06 K/UL — HIGH (ref 3.8–10.5)
WBC # FLD AUTO: 21.06 K/UL — HIGH (ref 3.8–10.5)

## 2019-05-17 PROCEDURE — 99233 SBSQ HOSP IP/OBS HIGH 50: CPT

## 2019-05-17 RX ORDER — ACETAMINOPHEN 500 MG
650 TABLET ORAL EVERY 6 HOURS
Refills: 0 | Status: DISCONTINUED | OUTPATIENT
Start: 2019-05-17 | End: 2019-05-24

## 2019-05-17 RX ORDER — AZITHROMYCIN 500 MG/1
500 TABLET, FILM COATED ORAL EVERY 24 HOURS
Refills: 0 | Status: DISCONTINUED | OUTPATIENT
Start: 2019-05-18 | End: 2019-05-23

## 2019-05-17 RX ORDER — MAGNESIUM SULFATE 500 MG/ML
1 VIAL (ML) INJECTION
Refills: 0 | Status: COMPLETED | OUTPATIENT
Start: 2019-05-17 | End: 2019-05-17

## 2019-05-17 RX ORDER — AZITHROMYCIN 500 MG/1
500 TABLET, FILM COATED ORAL ONCE
Refills: 0 | Status: COMPLETED | OUTPATIENT
Start: 2019-05-17 | End: 2019-05-17

## 2019-05-17 RX ORDER — RIVASTIGMINE 4.6 MG/24H
1 PATCH, EXTENDED RELEASE TRANSDERMAL EVERY 24 HOURS
Refills: 0 | Status: DISCONTINUED | OUTPATIENT
Start: 2019-05-17 | End: 2019-05-24

## 2019-05-17 RX ORDER — AZITHROMYCIN 500 MG/1
TABLET, FILM COATED ORAL
Refills: 0 | Status: DISCONTINUED | OUTPATIENT
Start: 2019-05-17 | End: 2019-05-23

## 2019-05-17 RX ADMIN — ENOXAPARIN SODIUM 30 MILLIGRAM(S): 100 INJECTION SUBCUTANEOUS at 17:20

## 2019-05-17 RX ADMIN — RIVASTIGMINE 1 PATCH: 4.6 PATCH, EXTENDED RELEASE TRANSDERMAL at 18:24

## 2019-05-17 RX ADMIN — PIPERACILLIN AND TAZOBACTAM 25 GRAM(S): 4; .5 INJECTION, POWDER, LYOPHILIZED, FOR SOLUTION INTRAVENOUS at 17:20

## 2019-05-17 RX ADMIN — SODIUM CHLORIDE 75 MILLILITER(S): 9 INJECTION, SOLUTION INTRAVENOUS at 05:02

## 2019-05-17 RX ADMIN — PIPERACILLIN AND TAZOBACTAM 25 GRAM(S): 4; .5 INJECTION, POWDER, LYOPHILIZED, FOR SOLUTION INTRAVENOUS at 05:01

## 2019-05-17 RX ADMIN — ESCITALOPRAM OXALATE 5 MILLIGRAM(S): 10 TABLET, FILM COATED ORAL at 11:02

## 2019-05-17 RX ADMIN — MEMANTINE HYDROCHLORIDE 10 MILLIGRAM(S): 10 TABLET ORAL at 17:21

## 2019-05-17 RX ADMIN — ATORVASTATIN CALCIUM 10 MILLIGRAM(S): 80 TABLET, FILM COATED ORAL at 21:29

## 2019-05-17 RX ADMIN — SODIUM CHLORIDE 75 MILLILITER(S): 9 INJECTION, SOLUTION INTRAVENOUS at 15:40

## 2019-05-17 RX ADMIN — OLANZAPINE 5 MILLIGRAM(S): 15 TABLET, FILM COATED ORAL at 11:02

## 2019-05-17 RX ADMIN — RIVASTIGMINE 1 PATCH: 4.6 PATCH, EXTENDED RELEASE TRANSDERMAL at 11:35

## 2019-05-17 RX ADMIN — AZITHROMYCIN 255 MILLIGRAM(S): 500 TABLET, FILM COATED ORAL at 11:35

## 2019-05-17 RX ADMIN — Medication 100 GRAM(S): at 10:27

## 2019-05-17 RX ADMIN — MIRTAZAPINE 45 MILLIGRAM(S): 45 TABLET, ORALLY DISINTEGRATING ORAL at 21:31

## 2019-05-17 RX ADMIN — MEMANTINE HYDROCHLORIDE 10 MILLIGRAM(S): 10 TABLET ORAL at 05:02

## 2019-05-17 RX ADMIN — Medication 100 GRAM(S): at 09:28

## 2019-05-17 NOTE — PROGRESS NOTE ADULT - SUBJECTIVE AND OBJECTIVE BOX
Follow-up Pall Progress Note    Evaristo Chester MD  [phone]    Patient resting comfortably this morning on high flow oxygen.    Medications:  Vital Signs Last 24 Hrs  T(C): 37.3 (17 May 2019 07:06), Max: 37.6 (16 May 2019 12:45)  T(F): 99.2 (17 May 2019 07:06), Max: 99.7 (16 May 2019 12:45)  HR: 71 (17 May 2019 07:06) (71 - 88)  BP: 149/90 (17 May 2019 07:06) (117/70 - 160/87)  BP(mean): --  RR: 18 (17 May 2019 07:06) (17 - 33)  SpO2: 94% (17 May 2019 07:06) (81% - 100%)    ABG - ( 16 May 2019 12:40 )  pH, Arterial: 7.25  pH, Blood: x     /  pCO2: 31    /  pO2: 52    / HCO3: x     / Base Excess: x     /  SaO2: 80                    05-16 @ 07:01  -  05-17 @ 07:00  --------------------------------------------------------  IN: 300 mL / OUT: 0 mL / NET: 300 mL        LABS:                        10.9   21.06 )-----------( 170      ( 17 May 2019 05:30 )             33.1     05-17    139  |  106  |  25<H>  ----------------------------<  81  4.1   |  23  |  2.45<H>    Ca    8.9      17 May 2019 05:30  Phos  2.7     05-17  Mg     1.3     05-17    TPro  5.1<L>  /  Alb  2.4<L>  /  TBili  0.6  /  DBili  x   /  AST  40  /  ALT  21  /  AlkPhos  66  05-17        Procalcitonin, Serum: >100.00 ng/mL (05-17-19 @ 05:30)        CULTURES:        Physical Examination:  PULM: coarse breath sounds right base  CVS: Regular rate and rhythm, no murmurs, rubs, or gallops  ABD: Soft, non-tender  EXT:  No clubbing, cyanosis, or edema    RADIOLOGY REVIEWED  Xray Chest 1 View-PORTABLE IMMEDIATE (05.16.19 @ 13:01) >    EXAM:  XR CHEST PORTABLE IMMED 1V      PROCEDURE DATE:  05/16/2019        INTERPRETATION:  Single view chest    History shortness of breath    Comparison 08/28/16    There is moderate airspace opacity in the right lower lung without lobar   consolidation or cavitation or layering effusion. The left hemithorax is   clear. There is no aidee central edema. The heart is normal in size.    IMPRESSION:    Findings consistent with moderately severe right-sided pneumonia                  SHRUTHI HERRERA M.D., ATTENDING RADIOLOGIST  This document has been electronically signed. May 16 2019  1:14PM    < end of copied text >

## 2019-05-17 NOTE — PROGRESS NOTE ADULT - ASSESSMENT
89 year old male with PMH dementia, anxiety/depression, dyslipidemia, HTN admitted to [facility] with severe sepsis, likely pulmonary source.

## 2019-05-17 NOTE — PROGRESS NOTE ADULT - SUBJECTIVE AND OBJECTIVE BOX
Patient offers no complaints today.    Vital Signs Last 24 Hrs  T(F): 99.2 (17 May 2019 07:06), Max: 99.7 (16 May 2019 12:45)  HR: 71 (17 May 2019 07:06) (71 - 88)  BP: 149/90 (17 May 2019 07:06) (117/70 - 160/87)  RR: 18 (17 May 2019 07:06) (17 - 33)  SpO2: 94% HiFlow 40L 40% (17 May 2019 07:06) (81% - 100%)    Labs                        10.9   21.06 )-----------( 170      ( 17 May 2019 05:30 )             33.1     139  |  106  |  25<H>  ----------------------------<  81  4.1   |  23  |  2.45<H>    Ca    8.9      17 May 2019 05:30  Phos  2.7     05-17  Mg     1.3     05-17  TPro  5.1<L>  /  Alb  2.4<L>  /  TBili  0.6  /  DBili  x   /  AST  40  /  ALT  21  /  AlkPhos  66  05-17    Procalcitonin, Serum (05.17.19 @ 05:30)    Procalcitonin, Serum: >100.00: Procalcitonin (PCT) Interpretation (ng/mL) - Diagnosis of systemic  bacterial infection/sepsis    MEDICATIONS  (STANDING):  atorvastatin 10 milliGRAM(s) Oral at bedtime  enoxaparin Injectable 30 milliGRAM(s) SubCutaneous every 24 hours  escitalopram 5 milliGRAM(s) Oral daily  lactated ringers. 1000 milliLiter(s) (75 mL/Hr) IV Continuous <Continuous>  magnesium sulfate  IVPB 1 Gram(s) IV Intermittent every 1 hour  memantine 10 milliGRAM(s) Oral two times a day  mirtazapine 45 milliGRAM(s) Oral at bedtime  OLANZapine 5 milliGRAM(s) Oral daily  piperacillin/tazobactam IVPB. 3.375 Gram(s) IV Intermittent every 12 hours    Physical Exam  Gen:  WN/WD Male resting in bed, NAD  ENT:  NC/AT, no JVD noted  Thorax:  Symmetric, no retractions  Lung:  Scattered rhonchi throughout right side  CV:  S1, S2. RRR  Abd:  Soft, NT/ND.  BS normoactive, no masses to palp  Extrem:  No C/C/E, DP/radial pulses +2  Neuro:  A&O x 3, no gross motor/sensory deficits Patient offers no complaints today.  poor historian    Vital Signs Last 24 Hrs  T(F): 99.2 (17 May 2019 07:06), Max: 99.7 (16 May 2019 12:45)  HR: 71 (17 May 2019 07:06) (71 - 88)  BP: 149/90 (17 May 2019 07:06) (117/70 - 160/87)  RR: 18 (17 May 2019 07:06) (17 - 33)  SpO2: 94% HiFlow 40L 40% (17 May 2019 07:06) (81% - 100%)    Labs                        10.9   21.06 )-----------( 170      ( 17 May 2019 05:30 )             33.1     139  |  106  |  25<H>  ----------------------------<  81  4.1   |  23  |  2.45<H>    Ca    8.9      17 May 2019 05:30  Phos  2.7     05-17  Mg     1.3     05-17  TPro  5.1<L>  /  Alb  2.4<L>  /  TBili  0.6  /  DBili  x   /  AST  40  /  ALT  21  /  AlkPhos  66  05-17    Procalcitonin, Serum (05.17.19 @ 05:30)    Procalcitonin, Serum: >100.00: Procalcitonin (PCT) Interpretation (ng/mL) - Diagnosis of systemic  bacterial infection/sepsis    MEDICATIONS  (STANDING):  atorvastatin 10 milliGRAM(s) Oral at bedtime  enoxaparin Injectable 30 milliGRAM(s) SubCutaneous every 24 hours  escitalopram 5 milliGRAM(s) Oral daily  lactated ringers. 1000 milliLiter(s) (75 mL/Hr) IV Continuous <Continuous>  magnesium sulfate  IVPB 1 Gram(s) IV Intermittent every 1 hour  memantine 10 milliGRAM(s) Oral two times a day  mirtazapine 45 milliGRAM(s) Oral at bedtime  OLANZapine 5 milliGRAM(s) Oral daily  piperacillin/tazobactam IVPB. 3.375 Gram(s) IV Intermittent every 12 hours    Physical Exam  Gen:  WN/WD Male resting in bed, NAD  ENT:  NC/AT, no JVD noted  Thorax:  Symmetric, no retractions  Lung:  Scattered rhonchi throughout right side  CV:  S1, S2. RRR  Abd:  Soft, NT/ND.  BS normoactive, no masses to palp  Extrem:  No C/C/E, DP/radial pulses +2  Neuro:  A&O x 1, no gross motor/sensory deficits

## 2019-05-17 NOTE — PROGRESS NOTE ADULT - ASSESSMENT
Palliative: Patient clinically improved with antibiotics and IV hydration. Findings consistent with bacterial pneumonia. We'll continue with conservative management with antibiotics and IV fluids. Eventually we'll need to address the possibility that this may represent chronic aspiration. We'll follow and further palliative recommendations pending clinical progress.

## 2019-05-18 LAB
ANION GAP SERPL CALC-SCNC: 7 MMOL/L — SIGNIFICANT CHANGE UP (ref 5–17)
BASOPHILS # BLD AUTO: 0 K/UL — SIGNIFICANT CHANGE UP (ref 0–0.2)
BASOPHILS NFR BLD AUTO: 0 % — SIGNIFICANT CHANGE UP (ref 0–2)
BUN SERPL-MCNC: 26 MG/DL — HIGH (ref 7–23)
CALCIUM SERPL-MCNC: 9.7 MG/DL — SIGNIFICANT CHANGE UP (ref 8.4–10.5)
CHLORIDE SERPL-SCNC: 106 MMOL/L — SIGNIFICANT CHANGE UP (ref 96–108)
CO2 SERPL-SCNC: 26 MMOL/L — SIGNIFICANT CHANGE UP (ref 22–31)
CREAT SERPL-MCNC: 2.2 MG/DL — HIGH (ref 0.5–1.3)
EOSINOPHIL # BLD AUTO: 0 K/UL — SIGNIFICANT CHANGE UP (ref 0–0.5)
EOSINOPHIL NFR BLD AUTO: 0 % — SIGNIFICANT CHANGE UP (ref 0–6)
GLUCOSE SERPL-MCNC: 75 MG/DL — SIGNIFICANT CHANGE UP (ref 70–99)
HCT VFR BLD CALC: 33.7 % — LOW (ref 39–50)
HGB BLD-MCNC: 10.9 G/DL — LOW (ref 13–17)
HYPOCHROMIA BLD QL: SLIGHT — SIGNIFICANT CHANGE UP
LACTATE SERPL-SCNC: 1.8 MMOL/L — SIGNIFICANT CHANGE UP (ref 0.7–2)
LEGIONELLA AG UR QL: NEGATIVE — SIGNIFICANT CHANGE UP
LYMPHOCYTES # BLD AUTO: 1.93 K/UL — SIGNIFICANT CHANGE UP (ref 1–3.3)
LYMPHOCYTES # BLD AUTO: 9 % — LOW (ref 13–44)
MAGNESIUM SERPL-MCNC: 1.8 MG/DL — SIGNIFICANT CHANGE UP (ref 1.6–2.6)
MANUAL SMEAR VERIFICATION: SIGNIFICANT CHANGE UP
MCHC RBC-ENTMCNC: 31.5 PG — SIGNIFICANT CHANGE UP (ref 27–34)
MCHC RBC-ENTMCNC: 32.3 GM/DL — SIGNIFICANT CHANGE UP (ref 32–36)
MCV RBC AUTO: 97.4 FL — SIGNIFICANT CHANGE UP (ref 80–100)
MONOCYTES # BLD AUTO: 0.86 K/UL — SIGNIFICANT CHANGE UP (ref 0–0.9)
MONOCYTES NFR BLD AUTO: 4 % — SIGNIFICANT CHANGE UP (ref 2–14)
NEUTROPHILS # BLD AUTO: 18.65 K/UL — HIGH (ref 1.8–7.4)
NEUTROPHILS NFR BLD AUTO: 82 % — HIGH (ref 43–77)
NEUTS BAND # BLD: 5 % — SIGNIFICANT CHANGE UP (ref 0–8)
NRBC # BLD: 0 /100 — SIGNIFICANT CHANGE UP (ref 0–0)
PHOSPHATE SERPL-MCNC: 3 MG/DL — SIGNIFICANT CHANGE UP (ref 2.5–4.5)
PLAT MORPH BLD: NORMAL — SIGNIFICANT CHANGE UP
PLATELET # BLD AUTO: 158 K/UL — SIGNIFICANT CHANGE UP (ref 150–400)
POTASSIUM SERPL-MCNC: 3.8 MMOL/L — SIGNIFICANT CHANGE UP (ref 3.5–5.3)
POTASSIUM SERPL-SCNC: 3.8 MMOL/L — SIGNIFICANT CHANGE UP (ref 3.5–5.3)
RBC # BLD: 3.46 M/UL — LOW (ref 4.2–5.8)
RBC # FLD: 12.4 % — SIGNIFICANT CHANGE UP (ref 10.3–14.5)
RBC BLD AUTO: ABNORMAL
SODIUM SERPL-SCNC: 139 MMOL/L — SIGNIFICANT CHANGE UP (ref 135–145)
WBC # BLD: 21.44 K/UL — HIGH (ref 3.8–10.5)
WBC # FLD AUTO: 21.44 K/UL — HIGH (ref 3.8–10.5)

## 2019-05-18 PROCEDURE — 71045 X-RAY EXAM CHEST 1 VIEW: CPT | Mod: 26

## 2019-05-18 PROCEDURE — 99233 SBSQ HOSP IP/OBS HIGH 50: CPT

## 2019-05-18 RX ORDER — IPRATROPIUM/ALBUTEROL SULFATE 18-103MCG
3 AEROSOL WITH ADAPTER (GRAM) INHALATION EVERY 6 HOURS
Refills: 0 | Status: DISCONTINUED | OUTPATIENT
Start: 2019-05-18 | End: 2019-05-24

## 2019-05-18 RX ADMIN — ATORVASTATIN CALCIUM 10 MILLIGRAM(S): 80 TABLET, FILM COATED ORAL at 21:30

## 2019-05-18 RX ADMIN — RIVASTIGMINE 1 PATCH: 4.6 PATCH, EXTENDED RELEASE TRANSDERMAL at 11:56

## 2019-05-18 RX ADMIN — RIVASTIGMINE 1 PATCH: 4.6 PATCH, EXTENDED RELEASE TRANSDERMAL at 19:30

## 2019-05-18 RX ADMIN — RIVASTIGMINE 1 PATCH: 4.6 PATCH, EXTENDED RELEASE TRANSDERMAL at 12:31

## 2019-05-18 RX ADMIN — Medication 3 MILLILITER(S): at 21:49

## 2019-05-18 RX ADMIN — SODIUM CHLORIDE 75 MILLILITER(S): 9 INJECTION, SOLUTION INTRAVENOUS at 17:16

## 2019-05-18 RX ADMIN — OLANZAPINE 5 MILLIGRAM(S): 15 TABLET, FILM COATED ORAL at 11:57

## 2019-05-18 RX ADMIN — MEMANTINE HYDROCHLORIDE 10 MILLIGRAM(S): 10 TABLET ORAL at 17:04

## 2019-05-18 RX ADMIN — PIPERACILLIN AND TAZOBACTAM 25 GRAM(S): 4; .5 INJECTION, POWDER, LYOPHILIZED, FOR SOLUTION INTRAVENOUS at 05:38

## 2019-05-18 RX ADMIN — MIRTAZAPINE 45 MILLIGRAM(S): 45 TABLET, ORALLY DISINTEGRATING ORAL at 21:30

## 2019-05-18 RX ADMIN — PIPERACILLIN AND TAZOBACTAM 25 GRAM(S): 4; .5 INJECTION, POWDER, LYOPHILIZED, FOR SOLUTION INTRAVENOUS at 17:05

## 2019-05-18 RX ADMIN — AZITHROMYCIN 255 MILLIGRAM(S): 500 TABLET, FILM COATED ORAL at 11:55

## 2019-05-18 RX ADMIN — MEMANTINE HYDROCHLORIDE 10 MILLIGRAM(S): 10 TABLET ORAL at 05:37

## 2019-05-18 RX ADMIN — ENOXAPARIN SODIUM 30 MILLIGRAM(S): 100 INJECTION SUBCUTANEOUS at 18:40

## 2019-05-18 RX ADMIN — ESCITALOPRAM OXALATE 5 MILLIGRAM(S): 10 TABLET, FILM COATED ORAL at 11:57

## 2019-05-18 NOTE — SWALLOW BEDSIDE ASSESSMENT ADULT - SLP PERTINENT HISTORY OF CURRENT PROBLEM
89yo M w/pmh dementia, hx TBI, depression, htn admitted for respiratory distress admitted with PNA/sepsis

## 2019-05-18 NOTE — PROGRESS NOTE ADULT - ASSESSMENT
89 year old male with PMH dementia, anxiety/depression, dyslipidemia, HTN admitted to [facility] with severe sepsis, likely pulmonary source.  Minimal clinical improvement.  Further palliative recommnedations pending clinical course.

## 2019-05-18 NOTE — PROGRESS NOTE ADULT - SUBJECTIVE AND OBJECTIVE BOX
Patient offers no complaints this AM.  Remains relatively hypoxic with SPo2 93% on high flow oxygen.  NPO pending speech and swallow evaluation but tolerating oral medications crushed with applesauce.  Voiding and incontinent of urine, no BM documented since admission.    Vital Signs Last 24 Hrs  T(C): 36.9 (18 May 2019 05:05), Max: 37.2 (17 May 2019 12:30)  T(F): 98.5 (18 May 2019 05:05), Max: 99 (17 May 2019 12:30)  HR: 88 (18 May 2019 06:29) (70 - 93)  BP: 128/78 (18 May 2019 05:05) (121/50 - 154/61)  RR: 18 (18 May 2019 09:12) (16 - 22)  SpO2: 94% Highflow 40L 70% (18 May 2019 09:12) (93% - 100%)    Labs             10.9   21.44 )-----------( 158      ( 18 May 2019 07:35 )             33.7   139  |  106  |  26<H>  ----------------------------<  75  3.8   |  26  |  2.20<H>  Ca    9.7      18 May 2019 07:35  Phos  3.0     05-18  Mg     1.8     05-18  TPro  5.1<L>  /  Alb  2.4<L>  /  TBili  0.6  /  DBili  x   /  AST  40  /  ALT  21  /  AlkPhos  66  05-17    MEDICATIONS  (STANDING):  atorvastatin 10 milliGRAM(s) Oral at bedtime  azithromycin  IVPB 500 milliGRAM(s) IV Intermittent every 24 hours  enoxaparin Injectable 30 milliGRAM(s) SubCutaneous every 24 hours  escitalopram 5 milliGRAM(s) Oral daily  lactated ringers. 1000 milliLiter(s) (75 mL/Hr) IV Continuous <Continuous>  memantine 10 milliGRAM(s) Oral two times a day  mirtazapine 45 milliGRAM(s) Oral at bedtime  OLANZapine 5 milliGRAM(s) Oral daily  piperacillin/tazobactam IVPB. 3.375 Gram(s) IV Intermittent every 12 hours  rivastigmine patch  9.5 mG/24 Hr(s) 1 Patch Transdermal every 24 hours  acetaminophen  Suppository .. 650 milliGRAM(s) Rectal every 6 hours PRN Temp greater or equal to 38C (100.4F), Mild Pain (1 - 3)    Physical Exam  Gen:  WN/WD Male resting in bed, NAD  ENT:  NC/AT, no JVD noted  Thorax:  Symmetric, no retractions  Lung:  Scattered rhonchi throughout right side, base rales  CV:  S1, S2. RRR  Abd:  Soft, NT/ND.  BS normoactive, no masses to palp  Extrem:  No C/C/E, DP/radial pulses +2  Neuro:  A&O x 1, no gross motor/sensory deficits

## 2019-05-18 NOTE — PROGRESS NOTE ADULT - SUBJECTIVE AND OBJECTIVE BOX
Follow-up Pall Progress Note    Evaristo Chester MD  [phone]    No new respiratory events overnight.  Denies SOB/CP. No improvement.    Medications:  Vital Signs Last 24 Hrs  T(C): 36.9 (18 May 2019 05:05), Max: 37.2 (17 May 2019 12:30)  T(F): 98.5 (18 May 2019 05:05), Max: 99 (17 May 2019 12:30)  HR: 88 (18 May 2019 06:29) (70 - 93)  BP: 128/78 (18 May 2019 05:05) (121/50 - 154/61)  BP(mean): --  RR: 18 (18 May 2019 09:12) (16 - 22)  SpO2: 94% (18 May 2019 09:12) (93% - 100%)    ABG - ( 16 May 2019 12:40 )  pH, Arterial: 7.25  pH, Blood: x     /  pCO2: 31    /  pO2: 52    / HCO3: x     / Base Excess: x     /  SaO2: 80                    05-17 @ 07:01  -  05-18 @ 07:00  --------------------------------------------------------  IN: 1375 mL / OUT: 0 mL / NET: 1375 mL        LABS:                        10.9   21.44 )-----------( 158      ( 18 May 2019 07:35 )             33.7     05-18    139  |  106  |  26<H>  ----------------------------<  75  3.8   |  26  |  2.20<H>    Ca    9.7      18 May 2019 07:35  Phos  3.0     05-18  Mg     1.8     05-18    TPro  5.1<L>  /  Alb  2.4<L>  /  TBili  0.6  /  DBili  x   /  AST  40  /  ALT  21  /  AlkPhos  66  05-17        Procalcitonin, Serum: >100.00 ng/mL (05-17-19 @ 05:30)        CULTURES:  Culture Results:   <10,000 CFU/mL Normal Urogenital Jazmine (05-16 @ 14:00)  Culture Results:   No growth to date. (05-16 @ 13:20)  Culture Results:   No growth to date. (05-16 @ 13:20)    Most recent blood culture -- 05-16 @ 14:00   -- -- .Urine Clean Catch (Midstream) 05-16 @ 14:00  Most recent blood culture -- 05-16 @ 13:20   -- -- .Blood Blood-Peripheral 05-16 @ 13:20      Physical Examination:  PULM: Clear to auscultation bilaterally, no significant sputum production  CVS: Regular rate and rhythm, no murmurs, rubs, or gallops  ABD: Soft, non-tender  EXT:  No clubbing, cyanosis, or edema    RADIOLOGY REVIEWED  CXR:  < from: Xray Chest 1 View- PORTABLE-Routine (05.18.19 @ 08:48) >  EXAM:  XR CHEST PORTABLE ROUTINE 1V      PROCEDURE DATE:  05/18/2019        INTERPRETATION:  History: Abnormal chest sounds    Portable chest radiograph is compared to 5/16/2019.    The heart is not enlarged. The trachea is midline. The left lung is   clear. There is again infiltrate in the right mid to lower lung. Osseous   structures demonstrate osteopenia. New    The noted    Impression: Redemonstration of moderate-sized infiltrate in the right mid   to lower lung.                  NAYANA VACA M.D.,ATTENDING RADIOLOGIST  This document has been electronically signed. May 18 2019  9:00AM        < end of copied text >    CT chest:    TTE:

## 2019-05-18 NOTE — SWALLOW BEDSIDE ASSESSMENT ADULT - COMMENTS
WBC - 21.44  CXR - Redemonstration of moderate-sized infiltrate in the right mid to lower lung.    Patient on high flow nasal cannula. Is being followed by palliative care.

## 2019-05-18 NOTE — PROGRESS NOTE ADULT - ASSESSMENT
89 year old male with PMH dementia, anxiety/depression, dyslipidemia, HTN admitted to [facility] with severe sepsis, likely pulmonary source.

## 2019-05-18 NOTE — SWALLOW BEDSIDE ASSESSMENT ADULT - ORAL PHASE
Within functional limits Decreased anterior-posterior movement of the bolus Lingual stasis/Delayed oral transit time/Decreased anterior-posterior movement of the bolus

## 2019-05-18 NOTE — SWALLOW BEDSIDE ASSESSMENT ADULT - SWALLOW EVAL: DIAGNOSIS
Patient presents with oropharyngeal dysphagia marked by slow, disorganized mastication/bolus formation given hard solids. Suspect reduced hyolaryngeal elevation/excursion per palpation as well as delayed swallow trigger across consistencies presented. Pt exhibits multiple swallows given hard solids/thin liquids. Also with coughing/wet vocal quality post swallow of thin liquids.

## 2019-05-18 NOTE — SWALLOW BEDSIDE ASSESSMENT ADULT - ASR SWALLOW RECOMMEND DIAG
will determine need for MBSS following additional SLP follow up, pending palliative care decision/level of alertness (discussed w/PA) will determine need for MBSS following additional SLP follow up, pending palliative care decision, family goals/level of alertness (discussed w/PA)

## 2019-05-18 NOTE — SWALLOW BEDSIDE ASSESSMENT ADULT - SWALLOW EVAL: RECOMMENDED FEEDING/EATING TECHNIQUES
small sips/bites/alternate food with liquid/crush medication (when feasible)/position upright (90 degrees)

## 2019-05-18 NOTE — SWALLOW BEDSIDE ASSESSMENT ADULT - PHARYNGEAL PHASE
Wet vocal quality post oral intake/Cough post oral intake/Multiple swallows/Delayed pharyngeal swallow/Decreased laryngeal elevation Delayed pharyngeal swallow/Decreased laryngeal elevation Delayed pharyngeal swallow/Decreased laryngeal elevation/Multiple swallows

## 2019-05-19 DIAGNOSIS — N18.9 CHRONIC KIDNEY DISEASE, UNSPECIFIED: ICD-10-CM

## 2019-05-19 DIAGNOSIS — K59.00 CONSTIPATION, UNSPECIFIED: ICD-10-CM

## 2019-05-19 RX ORDER — DOCUSATE SODIUM 100 MG
100 CAPSULE ORAL
Refills: 0 | Status: DISCONTINUED | OUTPATIENT
Start: 2019-05-19 | End: 2019-05-24

## 2019-05-19 RX ADMIN — RIVASTIGMINE 1 PATCH: 4.6 PATCH, EXTENDED RELEASE TRANSDERMAL at 10:11

## 2019-05-19 RX ADMIN — ATORVASTATIN CALCIUM 10 MILLIGRAM(S): 80 TABLET, FILM COATED ORAL at 21:29

## 2019-05-19 RX ADMIN — Medication 10 MILLIGRAM(S): at 11:10

## 2019-05-19 RX ADMIN — MIRTAZAPINE 45 MILLIGRAM(S): 45 TABLET, ORALLY DISINTEGRATING ORAL at 21:29

## 2019-05-19 RX ADMIN — ENOXAPARIN SODIUM 30 MILLIGRAM(S): 100 INJECTION SUBCUTANEOUS at 17:51

## 2019-05-19 RX ADMIN — ESCITALOPRAM OXALATE 5 MILLIGRAM(S): 10 TABLET, FILM COATED ORAL at 12:10

## 2019-05-19 RX ADMIN — OLANZAPINE 5 MILLIGRAM(S): 15 TABLET, FILM COATED ORAL at 12:10

## 2019-05-19 RX ADMIN — RIVASTIGMINE 1 PATCH: 4.6 PATCH, EXTENDED RELEASE TRANSDERMAL at 21:28

## 2019-05-19 RX ADMIN — Medication 100 MILLIGRAM(S): at 17:50

## 2019-05-19 RX ADMIN — RIVASTIGMINE 1 PATCH: 4.6 PATCH, EXTENDED RELEASE TRANSDERMAL at 12:10

## 2019-05-19 RX ADMIN — Medication 3 MILLILITER(S): at 21:45

## 2019-05-19 RX ADMIN — Medication 3 MILLILITER(S): at 09:06

## 2019-05-19 RX ADMIN — AZITHROMYCIN 255 MILLIGRAM(S): 500 TABLET, FILM COATED ORAL at 12:10

## 2019-05-19 RX ADMIN — MEMANTINE HYDROCHLORIDE 10 MILLIGRAM(S): 10 TABLET ORAL at 17:51

## 2019-05-19 RX ADMIN — MEMANTINE HYDROCHLORIDE 10 MILLIGRAM(S): 10 TABLET ORAL at 05:32

## 2019-05-19 RX ADMIN — Medication 3 MILLILITER(S): at 15:38

## 2019-05-19 RX ADMIN — PIPERACILLIN AND TAZOBACTAM 25 GRAM(S): 4; .5 INJECTION, POWDER, LYOPHILIZED, FOR SOLUTION INTRAVENOUS at 17:51

## 2019-05-19 RX ADMIN — Medication 3 MILLILITER(S): at 04:45

## 2019-05-19 RX ADMIN — PIPERACILLIN AND TAZOBACTAM 25 GRAM(S): 4; .5 INJECTION, POWDER, LYOPHILIZED, FOR SOLUTION INTRAVENOUS at 05:32

## 2019-05-19 NOTE — PROGRESS NOTE ADULT - SUBJECTIVE AND OBJECTIVE BOX
89yo M w/PMH dementia, HTN admitted 5/16 for RLL pneumonia on zosyn requiring high flow oxygenation at 60% (titrating as tolerated).  Speech following for decreased swallow effort - will continue to monitor.  Patient is DNR/DNI with palliative care following for possible Hospice evaluation.  Patient is sleeping comfortably in bed - arousable.

## 2019-05-19 NOTE — PROGRESS NOTE ADULT - PROBLEM SELECTOR PLAN 4
Continue gentle hydration  f/u am labs hold gentle hydration - closely monitoring po intake  f/u am labs

## 2019-05-19 NOTE — PROGRESS NOTE ADULT - ASSESSMENT
LABS: no new labs this am - will f/u new labs in am.                          10.9   21.44 )-----------( 158      ( 18 May 2019 07:35 )             33.7     05-18    139  |  106  |  26<H>  ----------------------------<  75  3.8   |  26  |  2.20<H>    Ca    9.7      18 May 2019 07:35  Phos  3.0     05-18  Mg     1.8     05-18      Procalcitonin, Serum: >100.00 ng/mL (05-17-19 @ 05:30)    CULTURES: (if applicable)    Culture - Urine (collected 05-16-19 @ 14:00)  Source: .Urine Clean Catch (Midstream)  Final Report (05-17-19 @ 13:53):    <10,000 CFU/mL Normal Urogenital Jazmine    Culture - Blood (collected 05-16-19 @ 13:20)  Source: .Blood Blood-Peripheral  Preliminary Report (05-17-19 @ 19:01):    No growth to date.    Culture - Blood (collected 05-16-19 @ 13:20)  Source: .Blood Blood-Peripheral  Preliminary Report (05-17-19 @ 19:01):    No growth to date.    VITALS:  T(C): 36.7 (05-19-19 @ 05:40), Max: 37.1 (05-18-19 @ 21:28)  T(F): 98 (05-19-19 @ 05:40), Max: 98.7 (05-18-19 @ 21:28)  HR: 79 (05-19-19 @ 05:40) (72 - 80)  BP: 113/76 (05-19-19 @ 05:40) (113/76 - 134/67)  BP(mean): --  ABP: --  ABP(mean): --  RR: 16 (05-19-19 @ 05:40) (16 - 18)  SpO2: 98% (05-19-19 @ 05:40) (94% - 99%)  CVP(mm Hg): --  CVP(cm H2O): --    Ins and Outs     05-18-19 @ 07:01  -  05-19-19 @ 07:00  --------------------------------------------------------  IN: 2395 mL / OUT: 250 mL / NET: 2145 mL        Height (cm): 165.1 (05-16-19 @ 17:09)  Weight (kg): 71 (05-16-19 @ 17:09)  BMI (kg/m2): 26 (05-16-19 @ 17:09)    MEDICATIONS  (STANDING):  ALBUTerol/ipratropium for Nebulization 3 milliLiter(s) Nebulizer every 6 hours  atorvastatin 10 milliGRAM(s) Oral at bedtime  azithromycin  IVPB      azithromycin  IVPB 500 milliGRAM(s) IV Intermittent every 24 hours  enoxaparin Injectable 30 milliGRAM(s) SubCutaneous every 24 hours  escitalopram 5 milliGRAM(s) Oral daily  lactated ringers. 1000 milliLiter(s) (75 mL/Hr) IV Continuous <Continuous>  memantine 10 milliGRAM(s) Oral two times a day  mirtazapine 45 milliGRAM(s) Oral at bedtime  OLANZapine 5 milliGRAM(s) Oral daily  piperacillin/tazobactam IVPB. 3.375 Gram(s) IV Intermittent every 12 hours  rivastigmine patch  9.5 mG/24 Hr(s) 1 Patch Transdermal every 24 hours    MEDICATIONS  (PRN):  acetaminophen  Suppository .. 650 milliGRAM(s) Rectal every 6 hours PRN Temp greater or equal to 38C (100.4F), Mild Pain (1 - 3)    PHYSICAL EXAM:      Constitutional: sleepy/easily arousable, afebrile over night  Respiratory: course BS b/l decreased at bases  Cardiovascular: RRR  Gastrointestinal: soft ND, NT - hypoactive BS, +BM  Genitourinary: incontinent to urine   Extremities: no edema, good cap refill  Vascular: 2+pulses b/l   Skin: dry and intact LABS: no new labs this am - will f/u new labs in am.                          10.9   21.44 )-----------( 158      ( 18 May 2019 07:35 )             33.7     05-18    139  |  106  |  26<H>  ----------------------------<  75  3.8   |  26  |  2.20<H>    Ca    9.7      18 May 2019 07:35  Phos  3.0     05-18  Mg     1.8     05-18      Procalcitonin, Serum: >100.00 ng/mL (05-17-19 @ 05:30)    CULTURES: (if applicable)    Culture - Urine (collected 05-16-19 @ 14:00)  Source: .Urine Clean Catch (Midstream)  Final Report (05-17-19 @ 13:53):    <10,000 CFU/mL Normal Urogenital Jazmine    Culture - Blood (collected 05-16-19 @ 13:20)  Source: .Blood Blood-Peripheral  Preliminary Report (05-17-19 @ 19:01):    No growth to date.    Culture - Blood (collected 05-16-19 @ 13:20)  Source: .Blood Blood-Peripheral  Preliminary Report (05-17-19 @ 19:01):    No growth to date.    VITALS:  T(C): 36.7 (05-19-19 @ 05:40), Max: 37.1 (05-18-19 @ 21:28)  T(F): 98 (05-19-19 @ 05:40), Max: 98.7 (05-18-19 @ 21:28)  HR: 79 (05-19-19 @ 05:40) (72 - 80)  BP: 113/76 (05-19-19 @ 05:40) (113/76 - 134/67)  BP(mean): --  ABP: --  ABP(mean): --  RR: 16 (05-19-19 @ 05:40) (16 - 18)  SpO2: 98% (05-19-19 @ 05:40) (94% - 99%)  CVP(mm Hg): --  CVP(cm H2O): --    Ins and Outs     05-18-19 @ 07:01  -  05-19-19 @ 07:00  --------------------------------------------------------  IN: 2395 mL / OUT: 250 mL / NET: 2145 mL        Height (cm): 165.1 (05-16-19 @ 17:09)  Weight (kg): 71 (05-16-19 @ 17:09)  BMI (kg/m2): 26 (05-16-19 @ 17:09)    MEDICATIONS  (STANDING):  ALBUTerol/ipratropium for Nebulization 3 milliLiter(s) Nebulizer every 6 hours  atorvastatin 10 milliGRAM(s) Oral at bedtime  azithromycin  IVPB      azithromycin  IVPB 500 milliGRAM(s) IV Intermittent every 24 hours  enoxaparin Injectable 30 milliGRAM(s) SubCutaneous every 24 hours  escitalopram 5 milliGRAM(s) Oral daily  lactated ringers. 1000 milliLiter(s) (75 mL/Hr) IV Continuous <Continuous>  memantine 10 milliGRAM(s) Oral two times a day  mirtazapine 45 milliGRAM(s) Oral at bedtime  OLANZapine 5 milliGRAM(s) Oral daily  piperacillin/tazobactam IVPB. 3.375 Gram(s) IV Intermittent every 12 hours  rivastigmine patch  9.5 mG/24 Hr(s) 1 Patch Transdermal every 24 hours    MEDICATIONS  (PRN):  acetaminophen  Suppository .. 650 milliGRAM(s) Rectal every 6 hours PRN Temp greater or equal to 38C (100.4F), Mild Pain (1 - 3)    PHYSICAL EXAM:      Constitutional: sleepy/easily arousable, afebrile over night  Respiratory: course BS b/l decreased at bases  Cardiovascular: RRR  Gastrointestinal: distended with mild tenderness upon palpation - hypoactive BS, +small BM this am  Genitourinary: incontinent to urine   Extremities: no edema, good cap refill  Vascular: 2+pulses b/l   Skin: dry and intact

## 2019-05-20 PROCEDURE — 99233 SBSQ HOSP IP/OBS HIGH 50: CPT

## 2019-05-20 RX ORDER — PANTOPRAZOLE SODIUM 20 MG/1
40 TABLET, DELAYED RELEASE ORAL
Refills: 0 | Status: DISCONTINUED | OUTPATIENT
Start: 2019-05-20 | End: 2019-05-24

## 2019-05-20 RX ORDER — METOPROLOL TARTRATE 50 MG
25 TABLET ORAL DAILY
Refills: 0 | Status: DISCONTINUED | OUTPATIENT
Start: 2019-05-20 | End: 2019-05-21

## 2019-05-20 RX ADMIN — Medication 100 MILLIGRAM(S): at 17:27

## 2019-05-20 RX ADMIN — PIPERACILLIN AND TAZOBACTAM 25 GRAM(S): 4; .5 INJECTION, POWDER, LYOPHILIZED, FOR SOLUTION INTRAVENOUS at 05:39

## 2019-05-20 RX ADMIN — Medication 10 MILLIGRAM(S): at 14:38

## 2019-05-20 RX ADMIN — ENOXAPARIN SODIUM 30 MILLIGRAM(S): 100 INJECTION SUBCUTANEOUS at 17:31

## 2019-05-20 RX ADMIN — ATORVASTATIN CALCIUM 10 MILLIGRAM(S): 80 TABLET, FILM COATED ORAL at 22:28

## 2019-05-20 RX ADMIN — PIPERACILLIN AND TAZOBACTAM 25 GRAM(S): 4; .5 INJECTION, POWDER, LYOPHILIZED, FOR SOLUTION INTRAVENOUS at 17:30

## 2019-05-20 RX ADMIN — Medication 3 MILLILITER(S): at 15:28

## 2019-05-20 RX ADMIN — Medication 3 MILLILITER(S): at 03:55

## 2019-05-20 RX ADMIN — RIVASTIGMINE 1 PATCH: 4.6 PATCH, EXTENDED RELEASE TRANSDERMAL at 14:33

## 2019-05-20 RX ADMIN — OLANZAPINE 5 MILLIGRAM(S): 15 TABLET, FILM COATED ORAL at 14:35

## 2019-05-20 RX ADMIN — ESCITALOPRAM OXALATE 5 MILLIGRAM(S): 10 TABLET, FILM COATED ORAL at 14:38

## 2019-05-20 RX ADMIN — Medication 25 MILLIGRAM(S): at 17:27

## 2019-05-20 RX ADMIN — MIRTAZAPINE 45 MILLIGRAM(S): 45 TABLET, ORALLY DISINTEGRATING ORAL at 22:27

## 2019-05-20 RX ADMIN — AZITHROMYCIN 255 MILLIGRAM(S): 500 TABLET, FILM COATED ORAL at 09:54

## 2019-05-20 RX ADMIN — MEMANTINE HYDROCHLORIDE 10 MILLIGRAM(S): 10 TABLET ORAL at 05:39

## 2019-05-20 RX ADMIN — Medication 3 MILLILITER(S): at 21:36

## 2019-05-20 RX ADMIN — PANTOPRAZOLE SODIUM 40 MILLIGRAM(S): 20 TABLET, DELAYED RELEASE ORAL at 17:27

## 2019-05-20 RX ADMIN — Medication 3 MILLILITER(S): at 09:17

## 2019-05-20 RX ADMIN — MEMANTINE HYDROCHLORIDE 10 MILLIGRAM(S): 10 TABLET ORAL at 17:27

## 2019-05-20 NOTE — DIETITIAN INITIAL EVALUATION ADULT. - NS AS NUTRI INTERV MEALS SNACK3
General/healthful diet/Texture-modified diet/will provide 2x portion eggs per preference/for increased needs

## 2019-05-20 NOTE — PROGRESS NOTE ADULT - SUBJECTIVE AND OBJECTIVE BOX
Patient offers no complaints today.  Tolerating PO with good intake when fed, has remained on bedrest.  Voiding and incontinent of urine, BM documented yesterday.     Vital Signs Last 24 Hrs  T(C): 36.4 (20 May 2019 08:59), Max: 37 (19 May 2019 16:27)  T(F): 97.6 (20 May 2019 08:59), Max: 98.6 (19 May 2019 16:27)  HR: 79 (20 May 2019 10:02) (70 - 85)  BP: 142/56 (20 May 2019 08:59) (129/70 - 142/56)  RR: 16 (20 May 2019 08:59) (16 - 16)  SpO2: 95% 6L NC (20 May 2019 10:02) (95% - 99%)    Labs  No new laboratory studies or imaging    MEDICATIONS  (STANDING):  ALBUTerol/ipratropium for Nebulization 3 milliLiter(s) Nebulizer every 6 hours  atorvastatin 10 milliGRAM(s) Oral at bedtime   azithromycin  IVPB 500 milliGRAM(s) IV Intermittent every 24 hours  bisacodyl Suppository 10 milliGRAM(s) Rectal daily  docusate sodium 100 milliGRAM(s) Oral two times a day  enoxaparin Injectable 30 milliGRAM(s) SubCutaneous every 24 hours  escitalopram 5 milliGRAM(s) Oral daily  memantine 10 milliGRAM(s) Oral two times a day  mirtazapine 45 milliGRAM(s) Oral at bedtime  OLANZapine 5 milliGRAM(s) Oral daily  piperacillin/tazobactam IVPB. 3.375 Gram(s) IV Intermittent every 12 hours  rivastigmine patch  9.5 mG/24 Hr(s) 1 Patch Transdermal every 24 hours  acetaminophen  Suppository .. 650 milliGRAM(s) Rectal every 6 hours PRN Temp greater or equal to 38C (100.4F), Mild Pain (1 - 3)    Physical Exam  Gen:  WN/WD Male resting in bed, NAD  ENT:  NC/AT, no JVD noted  Thorax:  Symmetric, no retractions  Lung:  CTA b/l  CV:  S1, S2. RRR  Abd:  Soft, NT/ND.  BS normoactive, no masses to palp  Extrem:  No C/C/E, DP/radial pulses +2  Neuro:  Oriented to self only, no gross motor/sensory deficits

## 2019-05-20 NOTE — DIETITIAN INITIAL EVALUATION ADULT. - PERTINENT MEDS FT
enoxaparin, zosyn IVPB, zithromax IVPB, lipitor, lexapro, namenda, remeron, zyprexa, exelon patch, dulcolax suppository, colace, toprol xl, protonix

## 2019-05-20 NOTE — DIETITIAN INITIAL EVALUATION ADULT. - PERTINENT LABORATORY DATA
(5/18) Hgb 10.9 L, Hct 33.7 L, Na 139 wnl, K 3.8 wnl, Cl 106 wnl, CO2 26 wnl, BUN 26 H, Creat 2.20 H, Glu 75 wnl, eGFR 25 L

## 2019-05-20 NOTE — PHYSICAL THERAPY INITIAL EVALUATION ADULT - ADDITIONAL COMMENTS
pt is a poor historian due to cognitive status/dementia & minimally verbal; lives w/dtr and ambulates with assistance per chart

## 2019-05-20 NOTE — PROGRESS NOTE ADULT - ASSESSMENT
89 year old male with PMH dementia, anxiety/depression, dyslipidemia, HTN admitted to [facility] with severe sepsis, likely pulmonary source.

## 2019-05-20 NOTE — DIETITIAN INITIAL EVALUATION ADULT. - PT NOT SOURCE
89 y/o M w/ dementia; hx of CKD, constipation, HLD, skull fracture, anxiety, depression; admitted w/ Septic Shock due to right sided PNA with severe metabolic acidosis and lactic acidosis/confused

## 2019-05-20 NOTE — CHART NOTE - NSCHARTNOTEFT_GEN_A_CORE
Called to evaluate patient for vascular access.  With aseptic technique 20g 6cm extended dwell peripheral catheter placed in left forearm with sonographic guidance on first attempt.  Dark blood return noted from port, flushed with no resistance.  Biopatch and sterile dressing applied, patient tolerated procedure well.

## 2019-05-20 NOTE — DIETITIAN INITIAL EVALUATION ADULT. - NUTRITIONGOAL OUTCOME3
Pt to consume >75% of meals and snacks on dysphagia 3 soft, nectar-thick fluids x 5 days to meet ENN

## 2019-05-20 NOTE — PROGRESS NOTE ADULT - SUBJECTIVE AND OBJECTIVE BOX
f/u palliative: no new events overnight,  pt in bed, alert, answers simple questions, appetite good, appears comfortable at present , no sob.     Present Symptoms:   Dyspnea: no  Nausea/Vomiting: no  Anxiety:  no  Depressed Mood: mild  Fatigue: mild  Loss of appetite: no  Pain:    no s/s               location:   Review of Systems: Unable to obtain due to poor mentation]    MEDICATIONS  (STANDING):  ALBUTerol/ipratropium for Nebulization 3 milliLiter(s) Nebulizer every 6 hours  atorvastatin 10 milliGRAM(s) Oral at bedtime  azithromycin  IVPB      azithromycin  IVPB 500 milliGRAM(s) IV Intermittent every 24 hours  bisacodyl Suppository 10 milliGRAM(s) Rectal daily  docusate sodium 100 milliGRAM(s) Oral two times a day  enoxaparin Injectable 30 milliGRAM(s) SubCutaneous every 24 hours  escitalopram 5 milliGRAM(s) Oral daily  memantine 10 milliGRAM(s) Oral two times a day  metoprolol succinate ER 25 milliGRAM(s) Oral daily  mirtazapine 45 milliGRAM(s) Oral at bedtime  OLANZapine 5 milliGRAM(s) Oral daily  pantoprazole    Tablet 40 milliGRAM(s) Oral before breakfast  piperacillin/tazobactam IVPB. 3.375 Gram(s) IV Intermittent every 12 hours  rivastigmine patch  9.5 mG/24 Hr(s) 1 Patch Transdermal every 24 hours    MEDICATIONS  (PRN):  acetaminophen  Suppository .. 650 milliGRAM(s) Rectal every 6 hours PRN Temp greater or equal to 38C (100.4F), Mild Pain (1 - 3)      PHYSICAL EXAM:  Vital Signs Last 24 Hrs  T(C): 36.4 (20 May 2019 08:59), Max: 37 (19 May 2019 16:27)  T(F): 97.6 (20 May 2019 08:59), Max: 98.6 (19 May 2019 16:27)  HR: 79 (20 May 2019 10:02) (70 - 85)  BP: 142/56 (20 May 2019 08:59) (129/70 - 142/56)  BP(mean): --  RR: 16 (20 May 2019 08:59) (16 - 16)  SpO2: 95% (20 May 2019 10:02) (95% - 99%)  General: alert  , awake, speaks few words      HEENT: n/c, a/t     Lungs: clear,    CV: s1s2    GI: soft, n/t     : nml    Musculoskeletal: weak x 4 , edema le's    Skin: normal, warm dry     Neuro: alert, oriented to self    Oral intake ability:  oral feeding w assist   Diet: soft as di     LABS:                RADIOLOGY & ADDITIONAL STUDIES:< from: Xray Chest 1 View- PORTABLE-Routine (05.18.19 @ 08:48) >    EXAM:  XR CHEST PORTABLE ROUTINE 1V      PROCEDURE DATE:  05/18/2019        INTERPRETATION:  History: Abnormal chest sounds    Portable chest radiograph is compared to 5/16/2019.    The heart is not enlarged. The trachea is midline. The left lung is   clear. There is again infiltrate in the right mid to lower lung. Osseous   structures demonstrate osteopenia. New    The noted    Impression: Redemonstration of moderate-sized infiltrate in the right mid   to lower lung.          ADVANCE DIRECTIVES: MOLST  DNR/DNI  Advanced Care Planning discussion total time spent:

## 2019-05-20 NOTE — DIETITIAN INITIAL EVALUATION ADULT. - OTHER INFO
Pt admitted w/ severe sepsis, c/o diarrhea/constipation. Evaluated by SLP; tolerating dysphagia 3, soft, nectar-thick fluids w/ good PO intakes per chart review & PCA (%). Admitted w/ stage I PU coccyx. PTA, pt was living with his daughter. Pt was able to provide some food preferences; Will provide 2x eggs @ breakfast to meet increased needs for skin integrity. Pt had BM this morning. On dulcolax suppository, colace.

## 2019-05-20 NOTE — PHYSICAL THERAPY INITIAL EVALUATION ADULT - IMPAIRMENTS FOUND, PT EVAL
cognitive impairment/arousal, attention, and cognition/muscle strength/gait, locomotion, and balance

## 2019-05-20 NOTE — DIETITIAN INITIAL EVALUATION ADULT. - SIGNS/SYMPTOMS
AEB dysphagia requiring dys 3 soft, nectar-thick fluids per speech & swallow eval AEB stage I PU coccyx

## 2019-05-20 NOTE — PROGRESS NOTE ADULT - ASSESSMENT
A/P; 89 year old male with PMH dementia, anxiety/depression, dyslipidemia, HTN admitted to [facility] with severe sepsis, likely pulmonary source.   Continues on IV antbx with  some clinical improvement. Pt in bed and appears comfortable today on N/C, with good appetite, no sob.    Patient DNR/DNI, MOLST treatment limited to antibiotics and O2 as per family does not want mechanical ventilation, BP support, or CPR.    Further palliative  recommendations  pending clinical course.  Cont  supportive care . A/P; 89 year old male with PMH dementia, anxiety/depression, dyslipidemia, HTN admitted to [facility] with severe sepsis, likely pulmonary source.   Continues on IV antbx with  some clinical improvement. Pt in bed and appears comfortable today on N/C, with good appetite, no sob.    Patient DNR/DNI, MOLST treatment limited to antibiotics and O2 as per family does not want mechanical ventilation, BP support, or CPR.    Further palliative  recommendations  pending clinical course.  Cont  supportive care .  Pt may have combo of medicaid and medicare and may appropriate for BAILEY with conversion to hospice pending clinical course.

## 2019-05-21 LAB
ANION GAP SERPL CALC-SCNC: 7 MMOL/L — SIGNIFICANT CHANGE UP (ref 5–17)
BUN SERPL-MCNC: 21 MG/DL — SIGNIFICANT CHANGE UP (ref 7–23)
CALCIUM SERPL-MCNC: 9.7 MG/DL — SIGNIFICANT CHANGE UP (ref 8.4–10.5)
CHLORIDE SERPL-SCNC: 107 MMOL/L — SIGNIFICANT CHANGE UP (ref 96–108)
CO2 SERPL-SCNC: 25 MMOL/L — SIGNIFICANT CHANGE UP (ref 22–31)
CREAT SERPL-MCNC: 1.7 MG/DL — HIGH (ref 0.5–1.3)
CULTURE RESULTS: SIGNIFICANT CHANGE UP
CULTURE RESULTS: SIGNIFICANT CHANGE UP
GLUCOSE SERPL-MCNC: 93 MG/DL — SIGNIFICANT CHANGE UP (ref 70–99)
HCT VFR BLD CALC: 30.1 % — LOW (ref 39–50)
HGB BLD-MCNC: 9.6 G/DL — LOW (ref 13–17)
MAGNESIUM SERPL-MCNC: 1.7 MG/DL — SIGNIFICANT CHANGE UP (ref 1.6–2.6)
MCHC RBC-ENTMCNC: 31.1 PG — SIGNIFICANT CHANGE UP (ref 27–34)
MCHC RBC-ENTMCNC: 31.9 GM/DL — LOW (ref 32–36)
MCV RBC AUTO: 97.4 FL — SIGNIFICANT CHANGE UP (ref 80–100)
NRBC # BLD: 0 /100 WBCS — SIGNIFICANT CHANGE UP (ref 0–0)
PHOSPHATE SERPL-MCNC: 2.4 MG/DL — LOW (ref 2.5–4.5)
PLATELET # BLD AUTO: 154 K/UL — SIGNIFICANT CHANGE UP (ref 150–400)
POTASSIUM SERPL-MCNC: 3.4 MMOL/L — LOW (ref 3.5–5.3)
POTASSIUM SERPL-SCNC: 3.4 MMOL/L — LOW (ref 3.5–5.3)
PROCALCITONIN SERPL-MCNC: 11.08 NG/ML — HIGH
RBC # BLD: 3.09 M/UL — LOW (ref 4.2–5.8)
RBC # FLD: 12.5 % — SIGNIFICANT CHANGE UP (ref 10.3–14.5)
SODIUM SERPL-SCNC: 139 MMOL/L — SIGNIFICANT CHANGE UP (ref 135–145)
SPECIMEN SOURCE: SIGNIFICANT CHANGE UP
SPECIMEN SOURCE: SIGNIFICANT CHANGE UP
WBC # BLD: 7.77 K/UL — SIGNIFICANT CHANGE UP (ref 3.8–10.5)
WBC # FLD AUTO: 7.77 K/UL — SIGNIFICANT CHANGE UP (ref 3.8–10.5)

## 2019-05-21 PROCEDURE — 93971 EXTREMITY STUDY: CPT | Mod: 26,RT

## 2019-05-21 PROCEDURE — 99233 SBSQ HOSP IP/OBS HIGH 50: CPT

## 2019-05-21 RX ORDER — METOPROLOL TARTRATE 50 MG
50 TABLET ORAL DAILY
Refills: 0 | Status: DISCONTINUED | OUTPATIENT
Start: 2019-05-22 | End: 2019-05-24

## 2019-05-21 RX ADMIN — PIPERACILLIN AND TAZOBACTAM 25 GRAM(S): 4; .5 INJECTION, POWDER, LYOPHILIZED, FOR SOLUTION INTRAVENOUS at 06:40

## 2019-05-21 RX ADMIN — Medication 3 MILLILITER(S): at 09:18

## 2019-05-21 RX ADMIN — RIVASTIGMINE 1 PATCH: 4.6 PATCH, EXTENDED RELEASE TRANSDERMAL at 07:55

## 2019-05-21 RX ADMIN — RIVASTIGMINE 1 PATCH: 4.6 PATCH, EXTENDED RELEASE TRANSDERMAL at 21:03

## 2019-05-21 RX ADMIN — MEMANTINE HYDROCHLORIDE 10 MILLIGRAM(S): 10 TABLET ORAL at 06:40

## 2019-05-21 RX ADMIN — Medication 3 MILLILITER(S): at 22:07

## 2019-05-21 RX ADMIN — MIRTAZAPINE 45 MILLIGRAM(S): 45 TABLET, ORALLY DISINTEGRATING ORAL at 21:05

## 2019-05-21 RX ADMIN — PANTOPRAZOLE SODIUM 40 MILLIGRAM(S): 20 TABLET, DELAYED RELEASE ORAL at 06:40

## 2019-05-21 RX ADMIN — Medication 3 MILLILITER(S): at 15:40

## 2019-05-21 RX ADMIN — AZITHROMYCIN 255 MILLIGRAM(S): 500 TABLET, FILM COATED ORAL at 13:43

## 2019-05-21 RX ADMIN — ATORVASTATIN CALCIUM 10 MILLIGRAM(S): 80 TABLET, FILM COATED ORAL at 21:06

## 2019-05-21 RX ADMIN — Medication 10 MILLIGRAM(S): at 12:27

## 2019-05-21 RX ADMIN — RIVASTIGMINE 1 PATCH: 4.6 PATCH, EXTENDED RELEASE TRANSDERMAL at 11:45

## 2019-05-21 RX ADMIN — ENOXAPARIN SODIUM 30 MILLIGRAM(S): 100 INJECTION SUBCUTANEOUS at 18:47

## 2019-05-21 RX ADMIN — Medication 100 MILLIGRAM(S): at 06:40

## 2019-05-21 RX ADMIN — PIPERACILLIN AND TAZOBACTAM 25 GRAM(S): 4; .5 INJECTION, POWDER, LYOPHILIZED, FOR SOLUTION INTRAVENOUS at 18:46

## 2019-05-21 RX ADMIN — RIVASTIGMINE 1 PATCH: 4.6 PATCH, EXTENDED RELEASE TRANSDERMAL at 12:25

## 2019-05-21 RX ADMIN — OLANZAPINE 5 MILLIGRAM(S): 15 TABLET, FILM COATED ORAL at 12:27

## 2019-05-21 RX ADMIN — Medication 3 MILLILITER(S): at 04:22

## 2019-05-21 RX ADMIN — ESCITALOPRAM OXALATE 5 MILLIGRAM(S): 10 TABLET, FILM COATED ORAL at 12:27

## 2019-05-21 RX ADMIN — MEMANTINE HYDROCHLORIDE 10 MILLIGRAM(S): 10 TABLET ORAL at 18:48

## 2019-05-21 RX ADMIN — Medication 25 MILLIGRAM(S): at 06:40

## 2019-05-21 NOTE — PROGRESS NOTE ADULT - SUBJECTIVE AND OBJECTIVE BOX
f/u palliative  no new events overnight , pt looks comfortable today , no sob     Present Symptoms:   Dyspnea: no  Nausea/Vomiting: no  Anxiety:  no  Depressed Mood: no  Fatigue: yes  Loss of appetite: no  Pain:              no s/s     location:   Review of Systems: [ Unable to obtain due to poor mentation]    MEDICATIONS  (STANDING):  ALBUTerol/ipratropium for Nebulization 3 milliLiter(s) Nebulizer every 6 hours  atorvastatin 10 milliGRAM(s) Oral at bedtime  azithromycin  IVPB      azithromycin  IVPB 500 milliGRAM(s) IV Intermittent every 24 hours  bisacodyl Suppository 10 milliGRAM(s) Rectal daily  docusate sodium 100 milliGRAM(s) Oral two times a day  enoxaparin Injectable 30 milliGRAM(s) SubCutaneous every 24 hours  escitalopram 5 milliGRAM(s) Oral daily  memantine 10 milliGRAM(s) Oral two times a day  mirtazapine 45 milliGRAM(s) Oral at bedtime  OLANZapine 5 milliGRAM(s) Oral daily  pantoprazole    Tablet 40 milliGRAM(s) Oral before breakfast  piperacillin/tazobactam IVPB. 3.375 Gram(s) IV Intermittent every 12 hours  rivastigmine patch  9.5 mG/24 Hr(s) 1 Patch Transdermal every 24 hours    MEDICATIONS  (PRN):  acetaminophen  Suppository .. 650 milliGRAM(s) Rectal every 6 hours PRN Temp greater or equal to 38C (100.4F), Mild Pain (1 - 3)      PHYSICAL EXAM:  Vital Signs Last 24 Hrs  T(C): 36.9 (21 May 2019 06:37), Max: 37 (20 May 2019 16:19)  T(F): 98.5 (21 May 2019 06:37), Max: 98.6 (20 May 2019 16:19)  HR: 88 (21 May 2019 06:37) (77 - 116)  BP: 146/60 (21 May 2019 06:37) (114/59 - 146/60)  BP(mean): --  RR: 16 (21 May 2019 06:37) (15 - 16)  SpO2: 98% (21 May 2019 09:19) (94% - 98%)  General: alert  to self few words       HEENT: normal    Lungs: clear poor insp effort    CV: s1s2    GI: soft, n/t + bs     : nml    Musculoskeletal: weak trace bi lat natalia    Skin: normal, warm dry     Neuro: alert, oriented to self speaks few words   Oral intake ability:  oral feeding w/ assist   Diet: soft      LABS:                          9.6    7.77  )-----------( 154      ( 21 May 2019 06:43 )             30.1     05-21    139  |  107  |  21  ----------------------------<  93  3.4<L>   |  25  |  1.70<H>    Ca    9.7      21 May 2019 06:43  Phos  2.4     05-21  Mg     1.7     05-21          RADIOLOGY & ADDITIONAL STUDIES:    ADVANCE DIRECTIVES: MOLST  DNR/DNI  Advanced Care Planning discussion total time spent:

## 2019-05-21 NOTE — PROGRESS NOTE ADULT - SUBJECTIVE AND OBJECTIVE BOX
Offers no complaints today.  OOB to chair with assistance, tolerating PO with good intake with observed feedings.  BM documented yesterday, voiding and incontinent of urine.    Vital Signs Last 24 Hrs  T(F): 98.5 (21 May 2019 06:37), Max: 98.6 (20 May 2019 16:19)  HR: 88 (21 May 2019 06:37) (77 - 116)  BP: 146/60 (21 May 2019 06:37) (114/59 - 146/60)  RR: 16 (21 May 2019 06:37) (15 - 16)  SpO2: 98% 6L NC (21 May 2019 09:19) (94% - 98%)    Labs                        9.6    7.77  )-----------( 154      ( 21 May 2019 06:43 )             30.1     139  |  107  |  21  ----------------------------<  93  3.4<L>   |  25  |  1.70<H>    Ca    9.7      21 May 2019 06:43  Phos  2.4     05-21  Mg     1.7     05-21    Procalcitonin, Serum (05.21.19 @ 06:43)    Procalcitonin, Serum: 11.08: Procalcitonin (PCT) Interpretation (ng/mL) - Diagnosis of systemic  bacterial infection/sepsis  PCT < 0.5: Systemic infection (sepsis) is not likely and risk for  progression to severe systemic infection is low. Local bacterial  infection is possible. If early sepsis is suspected clinically, PCT  should be re-assessed in 6-24 hours.  PCT >/= 0.5 but < 2.0: Systemic infection (sepsis) is possible, but other  conditions are known to elevate PCT as well. Moderate risk for  progression to severe systemic infection. The patient should be closely  monitored both clinically and by re-assessing PCT within 6-24 hours.  PCT >/= 2.0 but < 10.0: Systemic infection (sepsis) is likely, unless  other causes are known. High risk of progression to severe systemic  infection (severe sepsis/septic shock).  PCT >/= 10.0: Important systemic inflammatory response, almost  exclusively due to severe bacterial sepsis or septic shock. High  likelihood of severe sepsis or septic shock. ng/mL    MEDICATIONS    ALBUTerol/ipratropium for Nebulization 3 milliLiter(s) Nebulizer every 6 hours  atorvastatin 10 milliGRAM(s) Oral at bedtime   azithromycin  IVPB 500 milliGRAM(s) IV Intermittent every 24 hours  bisacodyl Suppository 10 milliGRAM(s) Rectal daily  docusate sodium 100 milliGRAM(s) Oral two times a day  enoxaparin Injectable 30 milliGRAM(s) SubCutaneous every 24 hours  escitalopram 5 milliGRAM(s) Oral daily  memantine 10 milliGRAM(s) Oral two times a day  metoprolol succinate ER 25 milliGRAM(s) Oral daily  mirtazapine 45 milliGRAM(s) Oral at bedtime  OLANZapine 5 milliGRAM(s) Oral daily  pantoprazole    Tablet 40 milliGRAM(s) Oral before breakfast  piperacillin/tazobactam IVPB. 3.375 Gram(s) IV Intermittent every 12 hours  rivastigmine patch  9.5 mG/24 Hr(s) 1 Patch Transdermal every 24 hours  acetaminophen  Suppository .. 650 milliGRAM(s) Rectal every 6 hours PRN Temp greater or equal to 38C (100.4F), Mild Pain (1 - 3)    Physical Exam  Gen:  WN/WD male sitting in bed, NAD  ENT:  NC/AT, no JVD noted  Thorax:  Symmetric, no retractions  Lung:  CTA b/l  CV:  S1, S2. RRR  Abd:  Soft, NT/ND.  BS normoactive, no masses to palp  Extrem:  No C/C/E, DP/radial pulses +2  Neuro:  Oriented to self only, no gross motor/sensory deficits Offers no complaints today.  OOB to chair with assistance, tolerating PO with good intake with observed feedings.  BM documented yesterday, voiding and incontinent of urine.    Vital Signs Last 24 Hrs  T(F): 98.5 (21 May 2019 06:37), Max: 98.6 (20 May 2019 16:19)  HR: 88 (21 May 2019 06:37) (77 - 116)  BP: 146/60 (21 May 2019 06:37) (114/59 - 146/60)  RR: 16 (21 May 2019 06:37) (15 - 16)  SpO2: 98% 6L NC (21 May 2019 09:19) (94% - 98%)    Labs                        9.6    7.77  )-----------( 154      ( 21 May 2019 06:43 )             30.1     139  |  107  |  21  ----------------------------<  93  3.4<L>   |  25  |  1.70<H>    Ca    9.7      21 May 2019 06:43  Phos  2.4     05-21  Mg     1.7     05-21    Procalcitonin, Serum (05.21.19 @ 06:43)    Procalcitonin, Serum: 11.08: Procalcitonin (PCT) Interpretation (ng/mL) - Diagnosis of systemic  bacterial infection/sepsis  PCT < 0.5: Systemic infection (sepsis) is not likely and risk for  progression to severe systemic infection is low. Local bacterial  infection is possible. If early sepsis is suspected clinically, PCT  should be re-assessed in 6-24 hours.  PCT >/= 0.5 but < 2.0: Systemic infection (sepsis) is possible, but other  conditions are known to elevate PCT as well. Moderate risk for  progression to severe systemic infection. The patient should be closely  monitored both clinically and by re-assessing PCT within 6-24 hours.  PCT >/= 2.0 but < 10.0: Systemic infection (sepsis) is likely, unless  other causes are known. High risk of progression to severe systemic  infection (severe sepsis/septic shock).  PCT >/= 10.0: Important systemic inflammatory response, almost  exclusively due to severe bacterial sepsis or septic shock. High  likelihood of severe sepsis or septic shock. ng/mL    MEDICATIONS    ALBUTerol/ipratropium for Nebulization 3 milliLiter(s) Nebulizer every 6 hours  atorvastatin 10 milliGRAM(s) Oral at bedtime   azithromycin  IVPB 500 milliGRAM(s) IV Intermittent every 24 hours  bisacodyl Suppository 10 milliGRAM(s) Rectal daily  docusate sodium 100 milliGRAM(s) Oral two times a day  enoxaparin Injectable 30 milliGRAM(s) SubCutaneous every 24 hours  escitalopram 5 milliGRAM(s) Oral daily  memantine 10 milliGRAM(s) Oral two times a day  metoprolol succinate ER 25 milliGRAM(s) Oral daily  mirtazapine 45 milliGRAM(s) Oral at bedtime  OLANZapine 5 milliGRAM(s) Oral daily  pantoprazole    Tablet 40 milliGRAM(s) Oral before breakfast  piperacillin/tazobactam IVPB. 3.375 Gram(s) IV Intermittent every 12 hours  rivastigmine patch  9.5 mG/24 Hr(s) 1 Patch Transdermal every 24 hours  acetaminophen  Suppository .. 650 milliGRAM(s) Rectal every 6 hours PRN Temp greater or equal to 38C (100.4F), Mild Pain (1 - 3)    Physical Exam  Gen:  WN/WD male sitting in bed, NAD  ENT:  NC/AT, no JVD noted  Thorax:  Symmetric, no retractions  Lung:  CTA b/l course b/s   CV:  S1, S2. RRR  Abd:  Soft, NT/ND.  BS normoactive, no masses to palp  Extrem:  No C/C/E, DP/radial pulses +2  Neuro:  Oriented to self only, no gross motor/sensory deficits

## 2019-05-21 NOTE — PROGRESS NOTE ADULT - ASSESSMENT
89 year old male with PMH dementia, anxiety/depression, dyslipidemia, HTN admitted to [facility] with severe sepsis, likely pulmonary source from aspiration event

## 2019-05-21 NOTE — PROGRESS NOTE ADULT - ASSESSMENT
A/P 89 year old male with PMH dementia, anxiety/depression, dyslipidemia, HTN admitted to [facility] with severe sepsis, likely pulmonary source.   Continues on IV antbx with  some clinical improvement. Pt in bed and appears comfortable today on N/C, with good appetite, no sob.    Patient DNR/DNI, MOLST treatment limited to antibiotics and O2 as per family does not want mechanical ventilation, BP support, or CPR.   pt will complete course antbx and likely transition to [facility]. A/P 89 year old male with PMH dementia, anxiety/depression, dyslipidemia, HTN admitted to [facility] with severe sepsis, likely pulmonary source.   Continues on IV antbx with  some clinical improvement. Pt in bed and appears comfortable today on N/C, with good appetite, no sob.    Patient DNR/DNI, MOLST treatment limited to antibiotics and O2 as per family does not want mechanical ventilation, BP support, or CPR.   pt will complete course antbx and likely transition to [facility].   dramatic clinical improvement compared to admission

## 2019-05-22 PROCEDURE — 99233 SBSQ HOSP IP/OBS HIGH 50: CPT

## 2019-05-22 RX ADMIN — Medication 3 MILLILITER(S): at 09:33

## 2019-05-22 RX ADMIN — Medication 3 MILLILITER(S): at 22:08

## 2019-05-22 RX ADMIN — ATORVASTATIN CALCIUM 10 MILLIGRAM(S): 80 TABLET, FILM COATED ORAL at 21:44

## 2019-05-22 RX ADMIN — Medication 100 MILLIGRAM(S): at 06:38

## 2019-05-22 RX ADMIN — Medication 3 MILLILITER(S): at 15:52

## 2019-05-22 RX ADMIN — PANTOPRAZOLE SODIUM 40 MILLIGRAM(S): 20 TABLET, DELAYED RELEASE ORAL at 06:38

## 2019-05-22 RX ADMIN — PIPERACILLIN AND TAZOBACTAM 25 GRAM(S): 4; .5 INJECTION, POWDER, LYOPHILIZED, FOR SOLUTION INTRAVENOUS at 06:38

## 2019-05-22 RX ADMIN — MEMANTINE HYDROCHLORIDE 10 MILLIGRAM(S): 10 TABLET ORAL at 17:29

## 2019-05-22 RX ADMIN — MIRTAZAPINE 45 MILLIGRAM(S): 45 TABLET, ORALLY DISINTEGRATING ORAL at 21:45

## 2019-05-22 RX ADMIN — RIVASTIGMINE 1 PATCH: 4.6 PATCH, EXTENDED RELEASE TRANSDERMAL at 07:29

## 2019-05-22 RX ADMIN — Medication 3 MILLILITER(S): at 04:15

## 2019-05-22 RX ADMIN — RIVASTIGMINE 1 PATCH: 4.6 PATCH, EXTENDED RELEASE TRANSDERMAL at 12:16

## 2019-05-22 RX ADMIN — Medication 100 MILLIGRAM(S): at 17:29

## 2019-05-22 RX ADMIN — Medication 10 MILLIGRAM(S): at 12:30

## 2019-05-22 RX ADMIN — ENOXAPARIN SODIUM 30 MILLIGRAM(S): 100 INJECTION SUBCUTANEOUS at 18:01

## 2019-05-22 RX ADMIN — RIVASTIGMINE 1 PATCH: 4.6 PATCH, EXTENDED RELEASE TRANSDERMAL at 17:13

## 2019-05-22 RX ADMIN — MEMANTINE HYDROCHLORIDE 10 MILLIGRAM(S): 10 TABLET ORAL at 06:38

## 2019-05-22 RX ADMIN — ESCITALOPRAM OXALATE 5 MILLIGRAM(S): 10 TABLET, FILM COATED ORAL at 12:30

## 2019-05-22 RX ADMIN — AZITHROMYCIN 255 MILLIGRAM(S): 500 TABLET, FILM COATED ORAL at 12:15

## 2019-05-22 RX ADMIN — Medication 50 MILLIGRAM(S): at 06:38

## 2019-05-22 RX ADMIN — PIPERACILLIN AND TAZOBACTAM 25 GRAM(S): 4; .5 INJECTION, POWDER, LYOPHILIZED, FOR SOLUTION INTRAVENOUS at 17:29

## 2019-05-22 RX ADMIN — OLANZAPINE 5 MILLIGRAM(S): 15 TABLET, FILM COATED ORAL at 12:30

## 2019-05-22 NOTE — PROGRESS NOTE ADULT - ASSESSMENT
ASSESSMENT and PLAN:  Septic shock - likely 2/2 aspiration pneumonia. Zosyn day 7/7, Azithromycin course completed. Leukocytosis resolved. Cultures with no growth and no longer having fevers.     Respiratory failure - likely a result of pneumonia. Currently supported by O2 via NC at 6LPM. Wean as tolerated. Pulmonary toilet.     Continue home meds for - Dementia, HTN, HLD with supportive care.       CODE STATUS: DNR/DNI    DISPO: Likely discharge to [facility] in accordance with families wishes. ASSESSMENT and PLAN:  Septic shock - likely 2/2 aspiration pneumonia. Zosyn day 7/7, Azithromycin course completed. Leukocytosis resolved. Cultures with no growth and no longer having fevers.     Respiratory failure - likely a result of pneumonia. Currently supported by O2 via NC at 4LPM. Wean as tolerated. Pulmonary toilet.     Continue home meds for - Dementia, HTN, HLD with supportive care.       CODE STATUS: DNR/DNI    DISPO: Likely discharge to [facility] in accordance with families wishes.

## 2019-05-22 NOTE — PROGRESS NOTE ADULT - SUBJECTIVE AND OBJECTIVE BOX
f/u palliative: no events overnight , currently in bed, appears comfortable, o2 via N/C , now weaning o2.     Present Symptoms:   Dyspnea: no  Nausea/Vomiting: no  Anxiety:  no  Depressed Mood:   Fatigue: yes  Loss of appetite: no  Pain:     no s/s              location:   Review of Systems:  Unable to obtain due to poor mentation]    MEDICATIONS  (STANDING):  ALBUTerol/ipratropium for Nebulization 3 milliLiter(s) Nebulizer every 6 hours  atorvastatin 10 milliGRAM(s) Oral at bedtime  azithromycin  IVPB      azithromycin  IVPB 500 milliGRAM(s) IV Intermittent every 24 hours  bisacodyl Suppository 10 milliGRAM(s) Rectal daily  docusate sodium 100 milliGRAM(s) Oral two times a day  enoxaparin Injectable 30 milliGRAM(s) SubCutaneous every 24 hours  escitalopram 5 milliGRAM(s) Oral daily  memantine 10 milliGRAM(s) Oral two times a day  metoprolol succinate ER 50 milliGRAM(s) Oral daily  mirtazapine 45 milliGRAM(s) Oral at bedtime  OLANZapine 5 milliGRAM(s) Oral daily  pantoprazole    Tablet 40 milliGRAM(s) Oral before breakfast  piperacillin/tazobactam IVPB. 3.375 Gram(s) IV Intermittent every 12 hours  rivastigmine patch  9.5 mG/24 Hr(s) 1 Patch Transdermal every 24 hours    MEDICATIONS  (PRN):  acetaminophen  Suppository .. 650 milliGRAM(s) Rectal every 6 hours PRN Temp greater or equal to 38C (100.4F), Mild Pain (1 - 3)      PHYSICAL EXAM:  Vital Signs Last 24 Hrs  T(C): 36.3 (22 May 2019 06:36), Max: 37.1 (21 May 2019 16:16)  T(F): 97.4 (22 May 2019 06:36), Max: 98.8 (21 May 2019 16:16)  HR: 80 (22 May 2019 09:35) (80 - 85)  BP: 139/69 (22 May 2019 06:36) (137/71 - 150/67)  BP(mean): --  RR: 16 (22 May 2019 06:36) (16 - 16)  SpO2: 90% (22 May 2019 09:35) (90% - 98%)  General: alert, oriented to self       HEENT: n/c, a/t    Lungs: clear ,comfortable   CV: s1s2    GI: soft, n/t + bs     : nml    Musculoskeletal: weakness x4    Skin: warm dry fragile     Neuro: cognitive deficits   Oral intake ability:  oral feeding w/ assist   Diet: soft as di     LABS:                          9.6    7.77  )-----------( 154      ( 21 May 2019 06:43 )             30.1     05-21    139  |  107  |  21  ----------------------------<  93  3.4<L>   |  25  |  1.70<H>    Ca    9.7      21 May 2019 06:43  Phos  2.4     05-21  Mg     1.7     05-21          RADIOLOGY & ADDITIONAL STUDIES:    ADVANCE DIRECTIVES: MOLST DNR/DNI  Advanced Care Planning discussion total time spent:

## 2019-05-22 NOTE — PROGRESS NOTE ADULT - SUBJECTIVE AND OBJECTIVE BOX
CC: Patient is a 89y old  Male who presents with a chief complaint of Diarrhea/Resp distress (21 May 2019 15:22)      S:  No fevers overnight. Patient offers no complaints/concerns. O2 via NC remains. OOB to chair with assistance. Incontinent of urine - voiding spontaneously/ Tolerating by mouth.     Patient seen and examined at bedside.    ALLERGIES:  No Known Allergies      MEDICATIONS:  acetaminophen  Suppository .. 650 milliGRAM(s) Rectal every 6 hours PRN  ALBUTerol/ipratropium for Nebulization 3 milliLiter(s) Nebulizer every 6 hours  atorvastatin 10 milliGRAM(s) Oral at bedtime  azithromycin  IVPB      azithromycin  IVPB 500 milliGRAM(s) IV Intermittent every 24 hours  bisacodyl Suppository 10 milliGRAM(s) Rectal daily  docusate sodium 100 milliGRAM(s) Oral two times a day  enoxaparin Injectable 30 milliGRAM(s) SubCutaneous every 24 hours  escitalopram 5 milliGRAM(s) Oral daily  memantine 10 milliGRAM(s) Oral two times a day  metoprolol succinate ER 50 milliGRAM(s) Oral daily  mirtazapine 45 milliGRAM(s) Oral at bedtime  OLANZapine 5 milliGRAM(s) Oral daily  pantoprazole    Tablet 40 milliGRAM(s) Oral before breakfast  piperacillin/tazobactam IVPB. 3.375 Gram(s) IV Intermittent every 12 hours  rivastigmine patch  9.5 mG/24 Hr(s) 1 Patch Transdermal every 24 hours        Vital Signs Last 24 Hrs  T(F): 97.4 (22 May 2019 06:36), Max: 98.8 (21 May 2019 16:16)  HR: 80 (22 May 2019 06:36) (80 - 85)  BP: 139/69 (22 May 2019 06:36) (137/71 - 150/67)  RR: 16 (22 May 2019 06:36) (16 - 16)  SpO2: 95% (22 May 2019 06:36) (94% - 98%)  I&O's Summary    21 May 2019 07:01  -  22 May 2019 07:00  --------------------------------------------------------  IN: 540 mL / OUT: 2 mL / NET: 538 mL        PHYSICAL EXAM:  General: NAD  ENT: MMM  Neck: Supple, No JVD  Lungs: Clear to auscultation bilaterally  Cardio: RRR, S1/S2, No murmurs  Abdomen: Soft, Nontender, Nondistended; Bowel sounds present  Extremities: No cyanosis, No edema  Neuro: no new deficits  Skin: no rashes  Psych: oriented to self only    LABS:                        9.6    7.77  )-----------( 154      ( 21 May 2019 06:43 )             30.1     05-21    139  |  107  |  21  ----------------------------<  93  3.4   |  25  |  1.70    Ca    9.7      21 May 2019 06:43  Phos  2.4     05-21  Mg     1.7     05-21      eGFR if Non African American: 35 mL/min/1.73M2 (05-21-19 @ 06:43)  eGFR if African American: 40 mL/min/1.73M2 (05-21-19 @ 06:43)

## 2019-05-22 NOTE — PROGRESS NOTE ADULT - ASSESSMENT
A/P: 89 year old male with PMH dementia, anxiety/depression, dyslipidemia, HTN admitted to [facility] with severe sepsis, likely pulmonary source.   Continues on IV antbx with showing clinical improvement. Pt in bed and appears comfortable today on N/C, trial weaning o2, no sob at present.     Patient DNR/DNI, MOLST treatment limited to antibiotics and O2 as per family does not want mechanical ventilation, BP support, or CPR.   Pt w/ clinical improvement, appears comfortable, no sob, plan  to complete course antbx and likely transition to [facility]. A/P: 89 year old male with PMH dementia, anxiety/depression, dyslipidemia, HTN admitted to [facility] with severe sepsis, likely pulmonary source.   Continues on IV antbx with showing clinical improvement. Pt in bed and appears comfortable today on N/C, trial weaning o2, no sob at present.     Patient DNR/DNI, MOLST treatment limited to antibiotics and O2 as per family does not want mechanical ventilation, BP support, or CPR.   Pt w/ clinical improvement, appears comfortable, no sob, plan  to complete course antbx and likely transition to [facility].  Family aware

## 2019-05-23 LAB
ANION GAP SERPL CALC-SCNC: 7 MMOL/L — SIGNIFICANT CHANGE UP (ref 5–17)
BASOPHILS # BLD AUTO: 0.02 K/UL — SIGNIFICANT CHANGE UP (ref 0–0.2)
BASOPHILS NFR BLD AUTO: 0.3 % — SIGNIFICANT CHANGE UP (ref 0–2)
BUN SERPL-MCNC: 24 MG/DL — HIGH (ref 7–23)
CALCIUM SERPL-MCNC: 9.4 MG/DL — SIGNIFICANT CHANGE UP (ref 8.4–10.5)
CHLORIDE SERPL-SCNC: 109 MMOL/L — HIGH (ref 96–108)
CO2 SERPL-SCNC: 24 MMOL/L — SIGNIFICANT CHANGE UP (ref 22–31)
CREAT SERPL-MCNC: 1.89 MG/DL — HIGH (ref 0.5–1.3)
EOSINOPHIL # BLD AUTO: 0.31 K/UL — SIGNIFICANT CHANGE UP (ref 0–0.5)
EOSINOPHIL NFR BLD AUTO: 4.2 % — SIGNIFICANT CHANGE UP (ref 0–6)
GLUCOSE SERPL-MCNC: 96 MG/DL — SIGNIFICANT CHANGE UP (ref 70–99)
HCT VFR BLD CALC: 30.5 % — LOW (ref 39–50)
HGB BLD-MCNC: 9.9 G/DL — LOW (ref 13–17)
IMM GRANULOCYTES NFR BLD AUTO: 1.5 % — SIGNIFICANT CHANGE UP (ref 0–1.5)
LYMPHOCYTES # BLD AUTO: 1.34 K/UL — SIGNIFICANT CHANGE UP (ref 1–3.3)
LYMPHOCYTES # BLD AUTO: 18.2 % — SIGNIFICANT CHANGE UP (ref 13–44)
MAGNESIUM SERPL-MCNC: 1.7 MG/DL — SIGNIFICANT CHANGE UP (ref 1.6–2.6)
MCHC RBC-ENTMCNC: 31.4 PG — SIGNIFICANT CHANGE UP (ref 27–34)
MCHC RBC-ENTMCNC: 32.5 GM/DL — SIGNIFICANT CHANGE UP (ref 32–36)
MCV RBC AUTO: 96.8 FL — SIGNIFICANT CHANGE UP (ref 80–100)
MONOCYTES # BLD AUTO: 0.76 K/UL — SIGNIFICANT CHANGE UP (ref 0–0.9)
MONOCYTES NFR BLD AUTO: 10.3 % — SIGNIFICANT CHANGE UP (ref 2–14)
NEUTROPHILS # BLD AUTO: 4.81 K/UL — SIGNIFICANT CHANGE UP (ref 1.8–7.4)
NEUTROPHILS NFR BLD AUTO: 65.5 % — SIGNIFICANT CHANGE UP (ref 43–77)
NRBC # BLD: 0 /100 WBCS — SIGNIFICANT CHANGE UP (ref 0–0)
PHOSPHATE SERPL-MCNC: 2.8 MG/DL — SIGNIFICANT CHANGE UP (ref 2.5–4.5)
PLATELET # BLD AUTO: 200 K/UL — SIGNIFICANT CHANGE UP (ref 150–400)
POTASSIUM SERPL-MCNC: 3.8 MMOL/L — SIGNIFICANT CHANGE UP (ref 3.5–5.3)
POTASSIUM SERPL-SCNC: 3.8 MMOL/L — SIGNIFICANT CHANGE UP (ref 3.5–5.3)
PROCALCITONIN SERPL-MCNC: 2.96 NG/ML — HIGH
RBC # BLD: 3.15 M/UL — LOW (ref 4.2–5.8)
RBC # FLD: 12.3 % — SIGNIFICANT CHANGE UP (ref 10.3–14.5)
SODIUM SERPL-SCNC: 140 MMOL/L — SIGNIFICANT CHANGE UP (ref 135–145)
WBC # BLD: 7.35 K/UL — SIGNIFICANT CHANGE UP (ref 3.8–10.5)
WBC # FLD AUTO: 7.35 K/UL — SIGNIFICANT CHANGE UP (ref 3.8–10.5)

## 2019-05-23 PROCEDURE — 99232 SBSQ HOSP IP/OBS MODERATE 35: CPT

## 2019-05-23 PROCEDURE — 71045 X-RAY EXAM CHEST 1 VIEW: CPT | Mod: 26

## 2019-05-23 RX ORDER — FUROSEMIDE 40 MG
40 TABLET ORAL ONCE
Refills: 0 | Status: COMPLETED | OUTPATIENT
Start: 2019-05-23 | End: 2019-05-23

## 2019-05-23 RX ADMIN — Medication 40 MILLIGRAM(S): at 11:14

## 2019-05-23 RX ADMIN — ATORVASTATIN CALCIUM 10 MILLIGRAM(S): 80 TABLET, FILM COATED ORAL at 22:52

## 2019-05-23 RX ADMIN — Medication 3 MILLILITER(S): at 04:08

## 2019-05-23 RX ADMIN — Medication 110 MILLIGRAM(S): at 18:38

## 2019-05-23 RX ADMIN — ESCITALOPRAM OXALATE 5 MILLIGRAM(S): 10 TABLET, FILM COATED ORAL at 11:14

## 2019-05-23 RX ADMIN — PIPERACILLIN AND TAZOBACTAM 25 GRAM(S): 4; .5 INJECTION, POWDER, LYOPHILIZED, FOR SOLUTION INTRAVENOUS at 05:31

## 2019-05-23 RX ADMIN — MIRTAZAPINE 45 MILLIGRAM(S): 45 TABLET, ORALLY DISINTEGRATING ORAL at 22:53

## 2019-05-23 RX ADMIN — Medication 50 MILLIGRAM(S): at 05:30

## 2019-05-23 RX ADMIN — MEMANTINE HYDROCHLORIDE 10 MILLIGRAM(S): 10 TABLET ORAL at 05:30

## 2019-05-23 RX ADMIN — PANTOPRAZOLE SODIUM 40 MILLIGRAM(S): 20 TABLET, DELAYED RELEASE ORAL at 05:31

## 2019-05-23 RX ADMIN — OLANZAPINE 5 MILLIGRAM(S): 15 TABLET, FILM COATED ORAL at 11:14

## 2019-05-23 RX ADMIN — Medication 3 MILLILITER(S): at 15:35

## 2019-05-23 RX ADMIN — Medication 100 MILLIGRAM(S): at 17:31

## 2019-05-23 RX ADMIN — RIVASTIGMINE 1 PATCH: 4.6 PATCH, EXTENDED RELEASE TRANSDERMAL at 09:24

## 2019-05-23 RX ADMIN — ENOXAPARIN SODIUM 30 MILLIGRAM(S): 100 INJECTION SUBCUTANEOUS at 17:31

## 2019-05-23 RX ADMIN — MEMANTINE HYDROCHLORIDE 10 MILLIGRAM(S): 10 TABLET ORAL at 17:31

## 2019-05-23 RX ADMIN — Medication 100 MILLIGRAM(S): at 05:30

## 2019-05-23 RX ADMIN — Medication 3 MILLILITER(S): at 09:14

## 2019-05-23 NOTE — PROGRESS NOTE ADULT - SUBJECTIVE AND OBJECTIVE BOX
f/u palliative : no events overnight, pt in bed this AM, alert, appears comfortable, no sob on o2 N/C, tapering .     Present Symptoms:   Dyspnea: no  Nausea/Vomiting: no  Anxiety:  no  Depressed Mood: no  Fatigue: yes  Loss of appetite: no  Pain:       no s/s             location:   Review of Systems:  Unable to obtain due to poor mentation]    MEDICATIONS  (STANDING):  ALBUTerol/ipratropium for Nebulization 3 milliLiter(s) Nebulizer every 6 hours  atorvastatin 10 milliGRAM(s) Oral at bedtime  bisacodyl Suppository 10 milliGRAM(s) Rectal daily  docusate sodium 100 milliGRAM(s) Oral two times a day  doxycycline IVPB 100 milliGRAM(s) IV Intermittent every 12 hours  enoxaparin Injectable 30 milliGRAM(s) SubCutaneous every 24 hours  escitalopram 5 milliGRAM(s) Oral daily  memantine 10 milliGRAM(s) Oral two times a day  metoprolol succinate ER 50 milliGRAM(s) Oral daily  mirtazapine 45 milliGRAM(s) Oral at bedtime  OLANZapine 5 milliGRAM(s) Oral daily  pantoprazole    Tablet 40 milliGRAM(s) Oral before breakfast  rivastigmine patch  9.5 mG/24 Hr(s) 1 Patch Transdermal every 24 hours    MEDICATIONS  (PRN):  acetaminophen  Suppository .. 650 milliGRAM(s) Rectal every 6 hours PRN Temp greater or equal to 38C (100.4F), Mild Pain (1 - 3)      PHYSICAL EXAM:  Vital Signs Last 24 Hrs  T(C): 37.1 (23 May 2019 11:09), Max: 37.1 (23 May 2019 11:09)  T(F): 98.8 (23 May 2019 11:09), Max: 98.8 (23 May 2019 11:09)  HR: 86 (23 May 2019 11:09) (83 - 87)  BP: 142/80 (23 May 2019 11:09) (129/61 - 142/80)  BP(mean): --  RR: 16 (23 May 2019 11:09) (16 - 17)  SpO2: 95% (23 May 2019 11:09) (90% - 98%)  General: alert        HEENT: n/c, a/t     Lungs: clear dim to bases    CV: s1s2    GI: soft, n/t + bs     : nml    Musculoskeletal: weak x 4    Skin: normal , warm dry    Neuro: alert, awake, confused    Oral intake ability:  oral feeding full assist   Diet: reg as di     LABS:                          9.9    7.35  )-----------( 200      ( 23 May 2019 05:15 )             30.5     05-23    140  |  109<H>  |  24<H>  ----------------------------<  96  3.8   |  24  |  1.89<H>    Ca    9.4      23 May 2019 05:15  Phos  2.8     05-23  Mg     1.7     05-23          RADIOLOGY & ADDITIONAL STUDIES:    ADVANCE DIRECTIVES: HCP  MOLST  DNR/DNI  Advanced Care Planning discussion total time spent:

## 2019-05-23 NOTE — PROGRESS NOTE ADULT - ASSESSMENT
ASSESSMENT and PLAN:  Septic shock - likely 2/2 aspiration pneumonia. Zosyn and Azithromycin courses completed. Leukocytosis resolved. Cultures with no growth and no longer having fevers.     Respiratory failure - likely a result of pneumonia. Currently supported by O2 via NC at 3LPM. Wean as tolerated. Pulmonary toilet.     Continue home meds for - Dementia, HTN, HLD with supportive care. OOB to chair PT/OT eval.        CODE STATUS: DNR/DNI    DISPO: Likely discharge to [facility] in accordance with families wishes. Will likely have O2 requirement.

## 2019-05-23 NOTE — PROGRESS NOTE ADULT - ASSESSMENT
A/P 89 year old male with PMH dementia, anxiety/depression, dyslipidemia, HTN admitted to [facility] with severe sepsis, likely pulmonary source.    IV antbx completed, clinically improved. Pt in bed and appears comfortable today on N/C, trial weaning o2, no sob at present.     Patient DNR/DNI, MOLST treatment limited to antibiotics and O2 as per family does not want mechanical ventilation, BP support, or CPR.   Pt.  clinically improved, appears comfortable, no sob, plan d/c to [facility] when medically cleared. A/P 89 year old male with PMH dementia, anxiety/depression, dyslipidemia, HTN admitted to [facility] with severe sepsis, likely pulmonary source.    IV antbx completed, clinically improved. Pt in bed and appears comfortable today on N/C, trial weaning o2, no sob at present.     Patient DNR/DNI, MOLST treatment limited to antibiotics and O2 as per family does not want mechanical ventilation, BP support, or CPR.   Pt.  clinically improved, appears comfortable, no sob, plan d/c to [facility] when medically cleared.  Family aware

## 2019-05-23 NOTE — PROGRESS NOTE ADULT - SUBJECTIVE AND OBJECTIVE BOX
CC: Patient is a 89y old  Male who presents with a chief complaint of Diarrhea/Resp distress (22 May 2019 11:41)      S:   Intermittent O2 requirement waxing and waning. No event overnight. Tolerating by mouth. Voiding spontaneously, however, incontinent. Offers no complaints or concerns.     Patient seen and examined at bedside.    ALLERGIES:  No Known Allergies      MEDICATIONS:  acetaminophen  Suppository .. 650 milliGRAM(s) Rectal every 6 hours PRN  ALBUTerol/ipratropium for Nebulization 3 milliLiter(s) Nebulizer every 6 hours  atorvastatin 10 milliGRAM(s) Oral at bedtime  bisacodyl Suppository 10 milliGRAM(s) Rectal daily  docusate sodium 100 milliGRAM(s) Oral two times a day  doxycycline IVPB 100 milliGRAM(s) IV Intermittent every 12 hours  enoxaparin Injectable 30 milliGRAM(s) SubCutaneous every 24 hours  escitalopram 5 milliGRAM(s) Oral daily  memantine 10 milliGRAM(s) Oral two times a day  metoprolol succinate ER 50 milliGRAM(s) Oral daily  mirtazapine 45 milliGRAM(s) Oral at bedtime  OLANZapine 5 milliGRAM(s) Oral daily  pantoprazole    Tablet 40 milliGRAM(s) Oral before breakfast  rivastigmine patch  9.5 mG/24 Hr(s) 1 Patch Transdermal every 24 hours        Vital Signs Last 24 Hrs  T(F): 98.8 (23 May 2019 11:09), Max: 98.8 (23 May 2019 11:09)  HR: 86 (23 May 2019 11:09) (83 - 87)  BP: 142/80 (23 May 2019 11:09) (129/61 - 142/80)  RR: 16 (23 May 2019 11:09) (16 - 17)  SpO2: 95% (23 May 2019 11:09) (90% - 98%)  I&O's Summary    22 May 2019 07:01  -  23 May 2019 07:00  --------------------------------------------------------  IN: 600 mL / OUT: 3 mL / NET: 597 mL    23 May 2019 07:01  -  23 May 2019 13:27  --------------------------------------------------------  IN: 150 mL / OUT: 0 mL / NET: 150 mL        PHYSICAL EXAM:  General: NAD  ENT: MMM  Neck: Supple, No JVD  Lungs: Clear to auscultation bilaterally  Cardio: RRR, S1/S2, No murmurs  Abdomen: Soft, Nontender, Nondistended; Bowel sounds present  Extremities: No cyanosis, No edema  Neuro: no new deficits  Skin: no rashes  Psych: alert, oriented to self only    LABS:                        9.9    7.35  )-----------( 200      ( 23 May 2019 05:15 )             30.5     05-23    140  |  109  |  24  ----------------------------<  96  3.8   |  24  |  1.89    Ca    9.4      23 May 2019 05:15  Phos  2.8     05-23  Mg     1.7     05-23      eGFR if Non African American: 31 mL/min/1.73M2 (05-23-19 @ 05:15)  eGFR if African American: 35 mL/min/1.73M2 (05-23-19 @ 05:15)                    CAPILLARY BLOOD GLUCOSE                RADIOLOGY & ADDITIONAL TESTS:    Care Discussed with Consultants/Other Providers:

## 2019-05-24 ENCOUNTER — TRANSCRIPTION ENCOUNTER (OUTPATIENT)
Age: 84
End: 2019-05-24

## 2019-05-24 VITALS
TEMPERATURE: 99 F | RESPIRATION RATE: 16 BRPM | OXYGEN SATURATION: 95 % | SYSTOLIC BLOOD PRESSURE: 96 MMHG | HEART RATE: 81 BPM | DIASTOLIC BLOOD PRESSURE: 52 MMHG

## 2019-05-24 DIAGNOSIS — S06.9X9A UNSPECIFIED INTRACRANIAL INJURY WITH LOSS OF CONSCIOUSNESS OF UNSPECIFIED DURATION, INITIAL ENCOUNTER: ICD-10-CM

## 2019-05-24 PROCEDURE — 84145 PROCALCITONIN (PCT): CPT

## 2019-05-24 PROCEDURE — 93971 EXTREMITY STUDY: CPT

## 2019-05-24 PROCEDURE — 99291 CRITICAL CARE FIRST HOUR: CPT | Mod: 25

## 2019-05-24 PROCEDURE — 84100 ASSAY OF PHOSPHORUS: CPT

## 2019-05-24 PROCEDURE — 93005 ELECTROCARDIOGRAM TRACING: CPT

## 2019-05-24 PROCEDURE — 87086 URINE CULTURE/COLONY COUNT: CPT

## 2019-05-24 PROCEDURE — 94760 N-INVAS EAR/PLS OXIMETRY 1: CPT

## 2019-05-24 PROCEDURE — 74018 RADEX ABDOMEN 1 VIEW: CPT

## 2019-05-24 PROCEDURE — 82803 BLOOD GASES ANY COMBINATION: CPT

## 2019-05-24 PROCEDURE — 83735 ASSAY OF MAGNESIUM: CPT

## 2019-05-24 PROCEDURE — 94660 CPAP INITIATION&MGMT: CPT

## 2019-05-24 PROCEDURE — 85027 COMPLETE CBC AUTOMATED: CPT

## 2019-05-24 PROCEDURE — 71045 X-RAY EXAM CHEST 1 VIEW: CPT

## 2019-05-24 PROCEDURE — 80048 BASIC METABOLIC PNL TOTAL CA: CPT

## 2019-05-24 PROCEDURE — 36415 COLL VENOUS BLD VENIPUNCTURE: CPT

## 2019-05-24 PROCEDURE — 87040 BLOOD CULTURE FOR BACTERIA: CPT

## 2019-05-24 PROCEDURE — 80053 COMPREHEN METABOLIC PANEL: CPT

## 2019-05-24 PROCEDURE — 87449 NOS EACH ORGANISM AG IA: CPT

## 2019-05-24 PROCEDURE — 36600 WITHDRAWAL OF ARTERIAL BLOOD: CPT

## 2019-05-24 PROCEDURE — 84484 ASSAY OF TROPONIN QUANT: CPT

## 2019-05-24 PROCEDURE — 92610 EVALUATE SWALLOWING FUNCTION: CPT

## 2019-05-24 PROCEDURE — 94668 MNPJ CHEST WALL SBSQ: CPT

## 2019-05-24 PROCEDURE — 83605 ASSAY OF LACTIC ACID: CPT

## 2019-05-24 PROCEDURE — 96367 TX/PROPH/DG ADDL SEQ IV INF: CPT

## 2019-05-24 PROCEDURE — 96365 THER/PROPH/DIAG IV INF INIT: CPT

## 2019-05-24 PROCEDURE — 94640 AIRWAY INHALATION TREATMENT: CPT

## 2019-05-24 PROCEDURE — 97161 PT EVAL LOW COMPLEX 20 MIN: CPT

## 2019-05-24 PROCEDURE — 92526 ORAL FUNCTION THERAPY: CPT

## 2019-05-24 RX ORDER — METOPROLOL TARTRATE 50 MG
0 TABLET ORAL
Qty: 0 | Refills: 0 | DISCHARGE

## 2019-05-24 RX ORDER — ACETAMINOPHEN 500 MG
650 TABLET ORAL EVERY 6 HOURS
Refills: 0 | Status: DISCONTINUED | OUTPATIENT
Start: 2019-05-24 | End: 2019-05-24

## 2019-05-24 RX ORDER — RIVASTIGMINE 4.6 MG/24H
0 PATCH, EXTENDED RELEASE TRANSDERMAL
Qty: 0 | Refills: 0 | DISCHARGE

## 2019-05-24 RX ORDER — ACETAMINOPHEN 500 MG
2 TABLET ORAL
Qty: 0 | Refills: 0 | DISCHARGE
Start: 2019-05-24

## 2019-05-24 RX ORDER — METOPROLOL TARTRATE 50 MG
1 TABLET ORAL
Qty: 0 | Refills: 0 | DISCHARGE
Start: 2019-05-24

## 2019-05-24 RX ORDER — ENOXAPARIN SODIUM 100 MG/ML
30 INJECTION SUBCUTANEOUS EVERY 24 HOURS
Refills: 0 | Status: DISCONTINUED | OUTPATIENT
Start: 2019-05-24 | End: 2019-05-24

## 2019-05-24 RX ORDER — PANTOPRAZOLE SODIUM 20 MG/1
1 TABLET, DELAYED RELEASE ORAL
Qty: 0 | Refills: 0 | DISCHARGE
Start: 2019-05-24

## 2019-05-24 RX ORDER — DOCUSATE SODIUM 100 MG
1 CAPSULE ORAL
Qty: 0 | Refills: 0 | DISCHARGE
Start: 2019-05-24

## 2019-05-24 RX ORDER — RIVASTIGMINE 4.6 MG/24H
1 PATCH, EXTENDED RELEASE TRANSDERMAL
Qty: 0 | Refills: 0 | DISCHARGE
Start: 2019-05-24

## 2019-05-24 RX ORDER — IPRATROPIUM/ALBUTEROL SULFATE 18-103MCG
3 AEROSOL WITH ADAPTER (GRAM) INHALATION
Qty: 0 | Refills: 0 | DISCHARGE
Start: 2019-05-24

## 2019-05-24 RX ADMIN — Medication 110 MILLIGRAM(S): at 05:47

## 2019-05-24 RX ADMIN — RIVASTIGMINE 1 PATCH: 4.6 PATCH, EXTENDED RELEASE TRANSDERMAL at 08:10

## 2019-05-24 RX ADMIN — PANTOPRAZOLE SODIUM 40 MILLIGRAM(S): 20 TABLET, DELAYED RELEASE ORAL at 05:47

## 2019-05-24 RX ADMIN — ENOXAPARIN SODIUM 30 MILLIGRAM(S): 100 INJECTION SUBCUTANEOUS at 13:52

## 2019-05-24 RX ADMIN — Medication 50 MILLIGRAM(S): at 05:47

## 2019-05-24 RX ADMIN — Medication 100 MILLIGRAM(S): at 05:47

## 2019-05-24 RX ADMIN — OLANZAPINE 5 MILLIGRAM(S): 15 TABLET, FILM COATED ORAL at 13:52

## 2019-05-24 RX ADMIN — RIVASTIGMINE 1 PATCH: 4.6 PATCH, EXTENDED RELEASE TRANSDERMAL at 09:48

## 2019-05-24 RX ADMIN — MEMANTINE HYDROCHLORIDE 10 MILLIGRAM(S): 10 TABLET ORAL at 05:47

## 2019-05-24 RX ADMIN — Medication 3 MILLILITER(S): at 09:40

## 2019-05-24 RX ADMIN — ESCITALOPRAM OXALATE 5 MILLIGRAM(S): 10 TABLET, FILM COATED ORAL at 13:53

## 2019-05-24 NOTE — PROGRESS NOTE ADULT - ATTENDING COMMENTS
patient seen and examined   appears to be stable and on n/c  comfortable  d/w DTR this am  will diurese , will give 3 days of doxy as procal remains elevated  as clinically improved need PT oob   dispo planning to [facility] planned     Assessment  Severe Sepsis with lactic Acidosis meeting Septic Shock criteria due to right sided PNA with severe metabolic acidosis and lactic acidosis  Hypoxic respiratory failure  due to above requiring High flow Resolving  Underlying:  Dementia, CKD base line cr in 2s    Plan   Continue care on AI ; awaiting disp  n/c o2 taper as tolerated,   PT, OOB  Lasix x 1 , doxy x 3 days  alredy finished Zithromax x 5 days,  zosyn x 7 days  PT OOB  f/u am labs  Start dispo planning d/w SW awaiting auth    GI/DVT ppx  DTR.   updated .
I have personally seen and examined patient on the above date.  I discussed the case with Hawa Bradley NP and I agree with findings and plan as detailed per note above, which I have amended where appropriate.
patient seen and examined   appears to be stable and on n/c  comfortable  d/w DTR this am      Assessment  Severe Sepsis with lactic Acidosis meeting Septic Shock criteria due to right sided PNA with severe metabolic acidosis and lactic acidosis  Hypoxic respiratory failure  due to above requiring High flow Resolving  Underlying:  Dementia, CKD base line cr in 2s    Plan   Continue care on AI  n/c o2 taper as tolerated,   PT, OOB  palliative/hospice eval - may benefit from home hospice vs comfort measures at Facility  appreciate S&S input  Zithromax x 5 days,  zosyn x 7 days  PT OOB  f/u am labs  Start dispo planning. this week after abx finish     GI/DVT ppx  DTR.   updated .
patient seen and examined   appears to be stable  fio2 tapered to 50 sat now 92%   pt comfortable  d/w DTR bedside      Assessment  Severe Sepsis with lactic Acidosis meeting Septic Shock criteria due to right sided PNA with severe metabolic acidosis and lactic acidosis  Hypoxic respiratory failure  due to above requiring High flow     Underlying:  Dementia, CKD base line cr in 2s    Plan   Continue care on AI  taper high flow fio2 as tolerated  d/c IVF  appreciat S&S input  Zithromax x 5 days,   continue zosyn  give laxative    GI/DVT ppx  d/w DTR.
patient seen and examined   Case d/w DTR, Dr. Chester today  pt clinically improved from yesterday  not as tachypneic but remains hypoxic requiring high flow      Assessment  Severe Sepsis with lactic Acidosis meeting Septic Shock criteria due to right sided PNA with severe metabolic acidosis and lactic acidosis  Hypoxic respiratory failure  due to above requiring High flow     Underlying:  Dementia, CKD base line cr in 2s    Plan   Continue care on AI  taper high flow as tolerated  taper IVF once able to tolerate diet  S&S f/u   IV abx, add Zithromax, check for legionella  f/u cultures NGTD  GI/DVT ppx  d/w DTR.
patient seen and examined   appears to be stable and on n/c  comforatble      Assessment  Severe Sepsis with lactic Acidosis meeting Septic Shock criteria due to right sided PNA with severe metabolic acidosis and lactic acidosis  Hypoxic respiratory failure  due to above requiring High flow   Underlying:  Dementia, CKD base line cr in 2s    Plan   Continue care on AI  n/c o2  PT, OOB  palliative/hospice eval - may benefit from home hospice vs comfort measures at Facility  appreciate S&S input  Zithromax x 5 days,   zosyn x 7 days  PT OOB  f/u am labs    GI/DVT ppx  DTR.   updated
pt seen and examined  d/w DTR bedside  d/w Dr> Reyes accepting MD at .   patient clinically improved, taper fio2, finish 2 more days of doxy,   d/c planning for today.
patient seen and examined   Case d/w DTR, Dr. Chester today  pt clinically improved from yesterday  not as tachypneic but remains hypoxic requiring high flow      Assessment  Severe Sepsis with lactic Acidosis meeting Septic Shock criteria due to right sided PNA with severe metabolic acidosis and lactic acidosis  Hypoxic respiratory failure  due to above requiring High flow / NIVNIV    Underlying:  Dementia, CKD base line cr in 2s    Plan   Continue care on AI  IVF, IV abx, add zithromax, check for legionella  check labs in am.  f/u cultures  GI/DVT ppx  diarrhea resolved - GI PCR may not be able to be sent
patient seen and examined   appears to be stable and on n/c  comfortable  Clinically improving  d/w DTR this afternoon       Assessment  Severe Sepsis with lactic Acidosis meeting Septic Shock criteria due to right sided PNA with severe metabolic acidosis and lactic acidosis  Hypoxic respiratory failure  due to above requiring High flow Resolving  Underlying:  Dementia, CKD base line cr in 2s    Plan   Continue care on AI  n/c o2 taper as tolerated,   PT, OOB  palliative/hospice eval - may benefit from home hospice vs comfort measures at Facility  appreciate S&S input  Zithromax x 5 days,  zosyn x 7 days  PT OOB  f/u am labs  Start dispo planning. this week after abx finish     GI/DVT ppx  DTR.   updated .        [phone]

## 2019-05-24 NOTE — PROGRESS NOTE ADULT - PROBLEM SELECTOR PLAN 2
Continue to monitor BP, restart beta blocker as tolerated
Continue to monitor mental status  continue to orient patient as needed
Restart home beta blocker and monitor BP

## 2019-05-24 NOTE — PROGRESS NOTE ADULT - PROVIDER SPECIALTY LIST ADULT
CCU
Critical Care
Palliative Care
Critical Care
Critical Care

## 2019-05-24 NOTE — PROGRESS NOTE ADULT - PROBLEM SELECTOR PROBLEM 4
Chronic kidney disease, unspecified CKD stage
Hypercholesteremia

## 2019-05-24 NOTE — DISCHARGE NOTE PROVIDER - NSDCCPCAREPLAN_GEN_ALL_CORE_FT
PRINCIPAL DISCHARGE DIAGNOSIS  Diagnosis: Septic shock  Assessment and Plan of Treatment:       SECONDARY DISCHARGE DIAGNOSES  Diagnosis: Pneumonia  Assessment and Plan of Treatment:

## 2019-05-24 NOTE — DISCHARGE NOTE PROVIDER - CARE PROVIDER_API CALL
Reyes, John M (DO)  Family Medicine  [address] [place] [address]  Phone: [phone]  Fax: [phone]  Follow Up Time:

## 2019-05-24 NOTE — PROGRESS NOTE ADULT - PROBLEM SELECTOR PROBLEM 1
Pneumonia of right lung due to infectious organism, unspecified part of lung
Severe sepsis

## 2019-05-24 NOTE — PROGRESS NOTE ADULT - SUBJECTIVE AND OBJECTIVE BOX
Patient offers no complaints this AM.  Tolerating diet with good intake when fed.  BM documented yesterday, voiding and incontinent of urine.  OOB chair with assistance.    Vital Signs Last 24 Hrs  T(F): 98.4 (24 May 2019 05:43), Max: 98.8 (23 May 2019 11:09)  HR: 73 (24 May 2019 09:41) (60 - 86)  BP: 126/78 (24 May 2019 05:43) (120/71 - 142/80)  RR: 17 (24 May 2019 05:43) (16 - 17)  SpO2: 92% 2L NC (May 2019 09:41) (92% - 98%)    No new labs or imaging    MEDICATIONS   ALBUTerol/ipratropium for Nebulization 3 milliLiter(s) Nebulizer every 6 hours  atorvastatin 10 milliGRAM(s) Oral at bedtime  bisacodyl Suppository 10 milliGRAM(s) Rectal daily  docusate sodium 100 milliGRAM(s) Oral two times a day  doxycycline hyclate Capsule 100 milliGRAM(s) Oral every 12 hours  enoxaparin Injectable 30 milliGRAM(s) SubCutaneous every 24 hours  escitalopram 5 milliGRAM(s) Oral daily  memantine 10 milliGRAM(s) Oral two times a day  metoprolol succinate ER 50 milliGRAM(s) Oral daily  mirtazapine 45 milliGRAM(s) Oral at bedtime  OLANZapine 5 milliGRAM(s) Oral daily  pantoprazole    Tablet 40 milliGRAM(s) Oral before breakfast  rivastigmine patch  9.5 mG/24 Hr(s) 1 Patch Transdermal every 24 hours  acetaminophen  Suppository .. 650 milliGRAM(s) Rectal every 6 hours PRN Temp greater or equal to 38C (100.4F), Mild Pain (1 - 3)    Physical Exam  Gen:  WN/WD Male resting in bed, NAD  ENT:  NC/AT, no JVD noted  Thorax:  Symmetric, no retractions  Lung:  CTA b/l  CV:  S1, S2. RRR  Abd:  Soft, NT/ND.  BS normoactive, no masses to palp  Extrem:  No C/C/E, DP/radial pulses +2  Neuro:  A&O x 1, no gross motor/sensory deficits

## 2019-05-24 NOTE — PROGRESS NOTE ADULT - PROBLEM SELECTOR PLAN 5
With history of TBI.  Continue Exelon TD, citalopram.  Supportive care
dulcolax suppository   start colace  monitor BM

## 2019-05-24 NOTE — DISCHARGE NOTE NURSING/CASE MANAGEMENT/SOCIAL WORK - NSDCDPATPORTLINK_GEN_ALL_CORE
You can access the [facility] Patient Portal, offered by [facility], by registering with the following website: [email or web address]

## 2019-05-24 NOTE — PROGRESS NOTE ADULT - PROBLEM SELECTOR PLAN 3
Creatinine remaining about 2.5, continue to monitor.  Monitor urine output, electrolytes
Creatinine slightly improved today, continue to monitor.  Monitor for urine output, electrolytes
Creatinine slightly improved today, continue to monitor.  Monitor for urine output, electrolytes
Monitor for urine output, check electrolytes and creatinine in AM
Stabilized, continue to monitor for voiding, outpatient follow up of creatinine.
holding BP meds - BP remains stable -   cont to monitor BP
Monitor for urine output, check electrolytes and creatinine in AM

## 2019-05-24 NOTE — DISCHARGE NOTE PROVIDER - HOSPITAL COURSE
89 year old male with PMH dementia s/p TBI 15 years ago and HTN presented to ED from home 5/16 with daughter complaint of diarrhea.  On further evaluation patient found to have RLL PNA with respiratory distress requiring NIV, met sepsis criteria.  Per family and MOLST patient full DNR/DNI, antibiotics initiated and patient given fluid replacement.          Over the next few days patient had steady improvement, cleared lactate with progressively less oxygen demand, weaned to nasal cannula.        With suspicion of aspiration being causative event, patient had speech and swallow evaluation.  Tolerated mechanical soft diet with nectar thick liquids.  OOB chair with assistance        Completed 7 day course of Zosyn and 5 day course of Azithromycin.  Patient remained afebrile with resolving leukocytosis, Doxycyline added for persistently elevated procalcitonin. 89 year old male with PMH dementia s/p TBI 15 years ago, dyslipidemia and HTN presented to ED from home 5/16 with daughter complaint of diarrhea.  On further evaluation patient found to have RLL PNA with respiratory distress requiring NIV, met sepsis criteria.  Per family and MOLST patient full DNR/DNI, antibiotics initiated and patient given fluid replacement.          Over the next few days patient had steady improvement, cleared lactate with progressively less oxygen demand, weaned to nasal cannula.  Also noted to have EMILEE which resolved with hydration.        With suspicion of aspiration being causative event, patient had speech and swallow evaluation.  Tolerated mechanical soft diet with nectar thick liquids.  OOB chair with assistance.        Completed 7 day course of Zosyn and 5 day course of Azithromycin.  Patient remained afebrile with resolving leukocytosis, Doxycyline added for persistently elevated procalcitonin. 89 year old male with PMH dementia s/p TBI 15 years ago, dyslipidemia and HTN presented to ED from home 5/16 with daughter complaint of diarrhea.  On further evaluation patient found to have RLL PNA with respiratory distress requiring NIV, met sepsis criteria.  Per family and MOLST patient full DNR/DNI, antibiotics initiated and patient given fluid replacement.          Over the next few days patient had steady improvement, cleared lactate with progressively less oxygen demand, weaned to nasal cannula.  Also noted to have EMILEE which resolved with hydration.        With suspicion of aspiration being causative event, patient had speech and swallow evaluation.  Tolerated mechanical soft diet with nectar thick liquids.  OOB chair with assistance.        Completed 7 day course of Zosyn and 5 day course of Azithromycin.  Patient remained afebrile with resolving leukocytosis, 3 days Doxycyline added for persistently elevated procalcitonin.            For full account of hospital course please refer to actual medical records. As this is a brief summery.

## 2019-05-24 NOTE — PROGRESS NOTE ADULT - PROBLEM SELECTOR PLAN 1
Continue azithromycin to complete 5 da course to be compleed monday 5/20.  Continue zosyn  continue duoneb  continue High flow oxygen - titrate as tolerated  aggressive CPT  aspiration precautions
Likely pulmonary source from possible aspiration event.  Continue high-flow oxygen, wean as tolerated.  Aggressive CPT.  Urine legionella pending, BC no growth to date, urine CNS <10,000 CFU.  Continue antibiotics, monitor fever curve and leukocytosis.  Patient DNR/DNI, treatment limited to antibiotics and O2 as per family does not want mechanical ventilation, BP support, or CPR
Likely pulmonary source from possible aspiration event.  Continue high-flow oxygen, wean as tolerated.  Aggressive CPT.  Urine legionella pending, BC no growth to date, urine CNS <10,000 CFU.  Continue antibiotics, monitor fever curve and leukocytosis.  Patient DNR/DNI, treatment limited to antibiotics and O2 as per family does not want mechanical ventilation, BP support, or CPR
Likely pulmonary source from possible aspiration event.  Continue high-flow oxygen, wean as tolerated.  Continue antibiotics, monitor fever curve and leukocytosis.  Patient DNR/DNI, treatment limited to antibiotics and O2 as pfamily does not want mechanical ventilation, BP support, or CPR
Likely pulmonary source from possible aspiration event.  Continue oxygen via NC, wean as tolerated.  Aggressive CPT.  Urine legionella negative, BC no growth to date, urine CNS <10,000 CFU.    Completed course of Azithromycin and Zosyn, now completing 3 days of Doxycyline due to persistently elevated procalcitonin.   Patient DNR/DNI, treatment limited to antibiotics and O2 as per family does not want mechanical ventilation, BP support, or CPR
Likely pulmonary source from possible aspiration event.  Continue oxygen via NC, wean as tolerated.  Aggressive CPT.  Urine legionella negative, BC no growth to date, urine CNS <10,000 CFU.    Continue antibiotics, Zosyn day 6/7, to complete 5 days f azithromycin today  Monitor fever curve and leukocytosis.    Patient DNR/DNI, treatment limited to antibiotics and O2 as per family does not want mechanical ventilation, BP support, or CPR
Likely pulmonary source from possible aspiration event.  Continue high-flow oxygen, wean as tolerated.  Aggressive CPT.  Urine legionella negative, BC no growth to date, urine CNS <10,000 CFU.    Continue antibiotics, Zosyn day 5/7 and azithromycin day 4/5.  Monitor fever curve and leukocytosis.    Patient DNR/DNI, treatment limited to antibiotics and O2 as per family does not want mechanical ventilation, BP support, or CPR

## 2019-05-24 NOTE — DISCHARGE NOTE PROVIDER - NSDCACTIVITY_GEN_ALL_CORE
No restrictions/Walking - Indoors allowed/Bathing allowed/Walking - Outdoors allowed/Showering allowed

## 2019-05-24 NOTE — PROGRESS NOTE ADULT - PROBLEM SELECTOR PLAN 6
SQ Lovenox for DVT  Protonix 40 PO QD  Diet as tolerated, patient needs to be fed  DNR/DNI
SQ Lovenox for DVT  Protonix 40 PO QD  Diet as tolerated, patient needs to be fed  DNR/DNI  For transfer to [facility] when course of antibiotics complete
SQ Lovenox for DVT  Protonix 40 PO QD  Diet as tolerated, patient needs to be fed  DNR/DNI  For transfer to [facility] today
SQ Lovenox for DVT  Start PO Protonix when tolerating  For speech and swallow today to evaluate for aspiration   DNR/DNI
SQ Lovenox for DVT  Start PO Protonix when tolerating  For speech and swallow today to evaluate for aspiration   DNR/DNI
SQ Lovenox for DVT  Start PO Protonix when tolerating  For speech and swallow today to evaluate for aspiration   DNR/DNI, consider hospice services if does not improve

## 2019-05-24 NOTE — PROGRESS NOTE ADULT - ASSESSMENT
89 year old male with PMH dementia, anxiety/depression, dyslipidemia, HTN admitted to [facility] with severe sepsis, likely pulmonary source from aspiration event.

## 2019-05-24 NOTE — PROGRESS NOTE ADULT - PROBLEM SELECTOR PROBLEM 2
Dementia without behavioral disturbance, unspecified dementia type
HTN (hypertension)

## 2019-05-24 NOTE — PROGRESS NOTE ADULT - PROBLEM SELECTOR PROBLEM 3
CKD (chronic kidney disease)
Hypertension, unspecified type
CKD (chronic kidney disease)

## 2019-05-24 NOTE — PROGRESS NOTE ADULT - REASON FOR ADMISSION
Diarrhea/Resp distress

## 2019-06-25 ENCOUNTER — APPOINTMENT (OUTPATIENT)
Dept: FAMILY MEDICINE | Facility: HOME HEALTH | Age: 84
End: 2019-06-25
Payer: MEDICARE

## 2019-06-25 PROCEDURE — 99350 HOME/RES VST EST HIGH MDM 60: CPT

## 2019-06-26 PROBLEM — F32.9 MAJOR DEPRESSIVE DISORDER, SINGLE EPISODE, UNSPECIFIED: Chronic | Status: ACTIVE | Noted: 2019-05-16

## 2019-06-26 PROBLEM — E78.00 PURE HYPERCHOLESTEROLEMIA, UNSPECIFIED: Chronic | Status: ACTIVE | Noted: 2019-05-16

## 2019-06-26 PROBLEM — F03.90 UNSPECIFIED DEMENTIA WITHOUT BEHAVIORAL DISTURBANCE: Chronic | Status: ACTIVE | Noted: 2019-05-16

## 2019-06-26 PROBLEM — F41.9 ANXIETY DISORDER, UNSPECIFIED: Chronic | Status: ACTIVE | Noted: 2019-05-16

## 2019-06-26 PROBLEM — S02.91XA UNSPECIFIED FRACTURE OF SKULL, INITIAL ENCOUNTER FOR CLOSED FRACTURE: Chronic | Status: ACTIVE | Noted: 2019-05-16

## 2019-06-26 PROBLEM — I10 ESSENTIAL (PRIMARY) HYPERTENSION: Chronic | Status: ACTIVE | Noted: 2019-05-16

## 2019-07-02 ENCOUNTER — RX RENEWAL (OUTPATIENT)
Age: 84
End: 2019-07-02

## 2019-07-15 ENCOUNTER — RX RENEWAL (OUTPATIENT)
Age: 84
End: 2019-07-15

## 2019-07-29 ENCOUNTER — MEDICATION RENEWAL (OUTPATIENT)
Age: 84
End: 2019-07-29

## 2019-08-19 ENCOUNTER — MEDICATION RENEWAL (OUTPATIENT)
Age: 84
End: 2019-08-19

## 2019-09-01 ENCOUNTER — TRANSCRIPTION ENCOUNTER (OUTPATIENT)
Age: 84
End: 2019-09-01

## 2019-09-01 ENCOUNTER — INPATIENT (INPATIENT)
Facility: HOSPITAL | Age: 84
LOS: 4 days | Discharge: HOME HEALTH | DRG: 480 | End: 2019-09-06
Attending: INTERNAL MEDICINE | Admitting: INTERNAL MEDICINE
Payer: COMMERCIAL

## 2019-09-01 VITALS
OXYGEN SATURATION: 97 % | HEART RATE: 62 BPM | WEIGHT: 160.06 LBS | SYSTOLIC BLOOD PRESSURE: 143 MMHG | HEIGHT: 62 IN | TEMPERATURE: 99 F | DIASTOLIC BLOOD PRESSURE: 80 MMHG | RESPIRATION RATE: 18 BRPM

## 2019-09-01 PROCEDURE — 72170 X-RAY EXAM OF PELVIS: CPT | Mod: 26,59

## 2019-09-01 PROCEDURE — 73502 X-RAY EXAM HIP UNI 2-3 VIEWS: CPT | Mod: 26,LT

## 2019-09-01 PROCEDURE — 99285 EMERGENCY DEPT VISIT HI MDM: CPT

## 2019-09-01 PROCEDURE — 73552 X-RAY EXAM OF FEMUR 2/>: CPT | Mod: 26,LT

## 2019-09-01 RX ORDER — ACETAMINOPHEN 500 MG
650 TABLET ORAL ONCE
Refills: 0 | Status: COMPLETED | OUTPATIENT
Start: 2019-09-01 | End: 2019-09-01

## 2019-09-01 RX ADMIN — Medication 650 MILLIGRAM(S): at 23:11

## 2019-09-01 RX ADMIN — Medication 650 MILLIGRAM(S): at 22:03

## 2019-09-01 NOTE — ED ADULT NURSE NOTE - NSIMPLEMENTINTERV_GEN_ALL_ED
Implemented All Fall with Harm Risk Interventions:  [place] to call system. Call bell, personal items and telephone within reach. Instruct patient to call for assistance. Room bathroom lighting operational. Non-slip footwear when patient is off stretcher. Physically safe environment: no spills, clutter or unnecessary equipment. Stretcher in lowest position, wheels locked, appropriate side rails in place. Provide visual cue, wrist band, yellow gown, etc. Monitor gait and stability. Monitor for mental status changes and reorient to person, place, and time. Review medications for side effects contributing to fall risk. Reinforce activity limits and safety measures with patient and family. Provide visual clues: red socks.

## 2019-09-01 NOTE — ED ADULT TRIAGE NOTE - CHIEF COMPLAINT QUOTE
Pt presents to the ED s/p fall at approx 5PM today, negative head injury, negative LOC. Pt was able to ambulate initially but then when he went to go to bed he was no longer able to move the left leg. Pt poor historian as he has a h/o dementia.

## 2019-09-02 DIAGNOSIS — Z98.890 OTHER SPECIFIED POSTPROCEDURAL STATES: Chronic | ICD-10-CM

## 2019-09-02 DIAGNOSIS — S72.002A FRACTURE OF UNSPECIFIED PART OF NECK OF LEFT FEMUR, INITIAL ENCOUNTER FOR CLOSED FRACTURE: ICD-10-CM

## 2019-09-02 DIAGNOSIS — Z90.49 ACQUIRED ABSENCE OF OTHER SPECIFIED PARTS OF DIGESTIVE TRACT: Chronic | ICD-10-CM

## 2019-09-02 LAB
ALBUMIN SERPL ELPH-MCNC: 3.4 G/DL — SIGNIFICANT CHANGE UP (ref 3.3–5)
ALP SERPL-CCNC: 96 U/L — SIGNIFICANT CHANGE UP (ref 40–120)
ALT FLD-CCNC: 18 U/L DA — SIGNIFICANT CHANGE UP (ref 10–45)
ANION GAP SERPL CALC-SCNC: 6 MMOL/L — SIGNIFICANT CHANGE UP (ref 5–17)
ANION GAP SERPL CALC-SCNC: 8 MMOL/L — SIGNIFICANT CHANGE UP (ref 5–17)
APPEARANCE UR: CLEAR — SIGNIFICANT CHANGE UP
APTT BLD: 28.1 SEC — SIGNIFICANT CHANGE UP (ref 27.5–36.3)
AST SERPL-CCNC: 23 U/L — SIGNIFICANT CHANGE UP (ref 10–40)
BILIRUB SERPL-MCNC: 0.4 MG/DL — SIGNIFICANT CHANGE UP (ref 0.2–1.2)
BILIRUB UR-MCNC: NEGATIVE — SIGNIFICANT CHANGE UP
BUN SERPL-MCNC: 26 MG/DL — HIGH (ref 7–23)
BUN SERPL-MCNC: 28 MG/DL — HIGH (ref 7–23)
CALCIUM SERPL-MCNC: 9.5 MG/DL — SIGNIFICANT CHANGE UP (ref 8.4–10.5)
CALCIUM SERPL-MCNC: 9.8 MG/DL — SIGNIFICANT CHANGE UP (ref 8.4–10.5)
CHLORIDE SERPL-SCNC: 104 MMOL/L — SIGNIFICANT CHANGE UP (ref 96–108)
CHLORIDE SERPL-SCNC: 107 MMOL/L — SIGNIFICANT CHANGE UP (ref 96–108)
CO2 SERPL-SCNC: 26 MMOL/L — SIGNIFICANT CHANGE UP (ref 22–31)
CO2 SERPL-SCNC: 27 MMOL/L — SIGNIFICANT CHANGE UP (ref 22–31)
COLOR SPEC: YELLOW — SIGNIFICANT CHANGE UP
CREAT SERPL-MCNC: 1.99 MG/DL — HIGH (ref 0.5–1.3)
CREAT SERPL-MCNC: 2.07 MG/DL — HIGH (ref 0.5–1.3)
DIFF PNL FLD: NEGATIVE — SIGNIFICANT CHANGE UP
GLUCOSE SERPL-MCNC: 126 MG/DL — HIGH (ref 70–99)
GLUCOSE SERPL-MCNC: 90 MG/DL — SIGNIFICANT CHANGE UP (ref 70–99)
GLUCOSE UR QL: NEGATIVE — SIGNIFICANT CHANGE UP
HCT VFR BLD CALC: 32.8 % — LOW (ref 39–50)
HCT VFR BLD CALC: 33.4 % — LOW (ref 39–50)
HGB BLD-MCNC: 10.5 G/DL — LOW (ref 13–17)
HGB BLD-MCNC: 10.9 G/DL — LOW (ref 13–17)
INR BLD: 1.16 RATIO — SIGNIFICANT CHANGE UP (ref 0.88–1.16)
KETONES UR-MCNC: NEGATIVE — SIGNIFICANT CHANGE UP
LEUKOCYTE ESTERASE UR-ACNC: ABNORMAL
MCHC RBC-ENTMCNC: 31.5 PG — SIGNIFICANT CHANGE UP (ref 27–34)
MCHC RBC-ENTMCNC: 32 GM/DL — SIGNIFICANT CHANGE UP (ref 32–36)
MCHC RBC-ENTMCNC: 32.2 PG — SIGNIFICANT CHANGE UP (ref 27–34)
MCHC RBC-ENTMCNC: 32.6 GM/DL — SIGNIFICANT CHANGE UP (ref 32–36)
MCV RBC AUTO: 98.5 FL — SIGNIFICANT CHANGE UP (ref 80–100)
MCV RBC AUTO: 98.5 FL — SIGNIFICANT CHANGE UP (ref 80–100)
NITRITE UR-MCNC: NEGATIVE — SIGNIFICANT CHANGE UP
NRBC # BLD: 0 /100 WBCS — SIGNIFICANT CHANGE UP (ref 0–0)
NRBC # BLD: 0 /100 WBCS — SIGNIFICANT CHANGE UP (ref 0–0)
PH UR: 7 — SIGNIFICANT CHANGE UP (ref 5–8)
PLATELET # BLD AUTO: 173 K/UL — SIGNIFICANT CHANGE UP (ref 150–400)
PLATELET # BLD AUTO: 199 K/UL — SIGNIFICANT CHANGE UP (ref 150–400)
POTASSIUM SERPL-MCNC: 4.3 MMOL/L — SIGNIFICANT CHANGE UP (ref 3.5–5.3)
POTASSIUM SERPL-MCNC: 4.4 MMOL/L — SIGNIFICANT CHANGE UP (ref 3.5–5.3)
POTASSIUM SERPL-SCNC: 4.3 MMOL/L — SIGNIFICANT CHANGE UP (ref 3.5–5.3)
POTASSIUM SERPL-SCNC: 4.4 MMOL/L — SIGNIFICANT CHANGE UP (ref 3.5–5.3)
PROT SERPL-MCNC: 6.5 G/DL — SIGNIFICANT CHANGE UP (ref 6–8.3)
PROT UR-MCNC: 30 MG/DL
PROTHROM AB SERPL-ACNC: 13 SEC — HIGH (ref 10–12.9)
RBC # BLD: 3.33 M/UL — LOW (ref 4.2–5.8)
RBC # BLD: 3.39 M/UL — LOW (ref 4.2–5.8)
RBC # FLD: 12.3 % — SIGNIFICANT CHANGE UP (ref 10.3–14.5)
RBC # FLD: 12.4 % — SIGNIFICANT CHANGE UP (ref 10.3–14.5)
SODIUM SERPL-SCNC: 138 MMOL/L — SIGNIFICANT CHANGE UP (ref 135–145)
SODIUM SERPL-SCNC: 140 MMOL/L — SIGNIFICANT CHANGE UP (ref 135–145)
SP GR SPEC: 1.01 — SIGNIFICANT CHANGE UP (ref 1.01–1.02)
UROBILINOGEN FLD QL: NEGATIVE — SIGNIFICANT CHANGE UP
WBC # BLD: 12.45 K/UL — HIGH (ref 3.8–10.5)
WBC # BLD: 9.91 K/UL — SIGNIFICANT CHANGE UP (ref 3.8–10.5)
WBC # FLD AUTO: 12.45 K/UL — HIGH (ref 3.8–10.5)
WBC # FLD AUTO: 9.91 K/UL — SIGNIFICANT CHANGE UP (ref 3.8–10.5)

## 2019-09-02 PROCEDURE — 27236 TREAT THIGH FRACTURE: CPT | Mod: LT

## 2019-09-02 PROCEDURE — 71045 X-RAY EXAM CHEST 1 VIEW: CPT | Mod: 26

## 2019-09-02 PROCEDURE — 93010 ELECTROCARDIOGRAM REPORT: CPT

## 2019-09-02 PROCEDURE — 27236 TREAT THIGH FRACTURE: CPT | Mod: AS,LT

## 2019-09-02 PROCEDURE — 72192 CT PELVIS W/O DYE: CPT | Mod: 26

## 2019-09-02 PROCEDURE — 99223 1ST HOSP IP/OBS HIGH 75: CPT

## 2019-09-02 RX ORDER — SODIUM CHLORIDE 9 MG/ML
1000 INJECTION, SOLUTION INTRAVENOUS
Refills: 0 | Status: DISCONTINUED | OUTPATIENT
Start: 2019-09-02 | End: 2019-09-02

## 2019-09-02 RX ORDER — ACETAMINOPHEN 500 MG
650 TABLET ORAL EVERY 6 HOURS
Refills: 0 | Status: DISCONTINUED | OUTPATIENT
Start: 2019-09-02 | End: 2019-09-06

## 2019-09-02 RX ORDER — ATORVASTATIN CALCIUM 80 MG/1
10 TABLET, FILM COATED ORAL AT BEDTIME
Refills: 0 | Status: DISCONTINUED | OUTPATIENT
Start: 2019-09-02 | End: 2019-09-06

## 2019-09-02 RX ORDER — TRIAMTERENE/HYDROCHLOROTHIAZID 75 MG-50MG
1 TABLET ORAL
Qty: 0 | Refills: 0 | DISCHARGE

## 2019-09-02 RX ORDER — HEPARIN SODIUM 5000 [USP'U]/ML
5000 INJECTION INTRAVENOUS; SUBCUTANEOUS EVERY 8 HOURS
Refills: 0 | Status: DISCONTINUED | OUTPATIENT
Start: 2019-09-02 | End: 2019-09-02

## 2019-09-02 RX ORDER — ATORVASTATIN CALCIUM 80 MG/1
10 TABLET, FILM COATED ORAL AT BEDTIME
Refills: 0 | Status: DISCONTINUED | OUTPATIENT
Start: 2019-09-02 | End: 2019-09-02

## 2019-09-02 RX ORDER — METOPROLOL TARTRATE 50 MG
25 TABLET ORAL
Refills: 0 | Status: DISCONTINUED | OUTPATIENT
Start: 2019-09-02 | End: 2019-09-06

## 2019-09-02 RX ORDER — ENOXAPARIN SODIUM 100 MG/ML
30 INJECTION SUBCUTANEOUS DAILY
Refills: 0 | Status: DISCONTINUED | OUTPATIENT
Start: 2019-09-03 | End: 2019-09-06

## 2019-09-02 RX ORDER — MEMANTINE HYDROCHLORIDE 10 MG/1
10 TABLET ORAL
Refills: 0 | Status: DISCONTINUED | OUTPATIENT
Start: 2019-09-02 | End: 2019-09-06

## 2019-09-02 RX ORDER — SODIUM CHLORIDE 9 MG/ML
1000 INJECTION INTRAMUSCULAR; INTRAVENOUS; SUBCUTANEOUS
Refills: 0 | Status: DISCONTINUED | OUTPATIENT
Start: 2019-09-02 | End: 2019-09-02

## 2019-09-02 RX ORDER — MORPHINE SULFATE 50 MG/1
2 CAPSULE, EXTENDED RELEASE ORAL EVERY 6 HOURS
Refills: 0 | Status: DISCONTINUED | OUTPATIENT
Start: 2019-09-02 | End: 2019-09-02

## 2019-09-02 RX ORDER — ESCITALOPRAM OXALATE 10 MG/1
5 TABLET, FILM COATED ORAL DAILY
Refills: 0 | Status: DISCONTINUED | OUTPATIENT
Start: 2019-09-02 | End: 2019-09-06

## 2019-09-02 RX ORDER — ONDANSETRON 8 MG/1
4 TABLET, FILM COATED ORAL EVERY 6 HOURS
Refills: 0 | Status: DISCONTINUED | OUTPATIENT
Start: 2019-09-02 | End: 2019-09-06

## 2019-09-02 RX ORDER — SENNA PLUS 8.6 MG/1
2 TABLET ORAL AT BEDTIME
Refills: 0 | Status: DISCONTINUED | OUTPATIENT
Start: 2019-09-02 | End: 2019-09-06

## 2019-09-02 RX ORDER — ESCITALOPRAM OXALATE 10 MG/1
5 TABLET, FILM COATED ORAL DAILY
Refills: 0 | Status: DISCONTINUED | OUTPATIENT
Start: 2019-09-02 | End: 2019-09-02

## 2019-09-02 RX ORDER — SENNA PLUS 8.6 MG/1
2 TABLET ORAL AT BEDTIME
Refills: 0 | Status: DISCONTINUED | OUTPATIENT
Start: 2019-09-02 | End: 2019-09-02

## 2019-09-02 RX ORDER — SODIUM CHLORIDE 9 MG/ML
1000 INJECTION, SOLUTION INTRAVENOUS
Refills: 0 | Status: DISCONTINUED | OUTPATIENT
Start: 2019-09-02 | End: 2019-09-06

## 2019-09-02 RX ORDER — MEMANTINE HYDROCHLORIDE 10 MG/1
10 TABLET ORAL
Refills: 0 | Status: DISCONTINUED | OUTPATIENT
Start: 2019-09-02 | End: 2019-09-02

## 2019-09-02 RX ORDER — HYDROMORPHONE HYDROCHLORIDE 2 MG/ML
0.5 INJECTION INTRAMUSCULAR; INTRAVENOUS; SUBCUTANEOUS EVERY 6 HOURS
Refills: 0 | Status: DISCONTINUED | OUTPATIENT
Start: 2019-09-02 | End: 2019-09-02

## 2019-09-02 RX ORDER — RIVASTIGMINE 4.6 MG/24H
1 PATCH, EXTENDED RELEASE TRANSDERMAL EVERY 24 HOURS
Refills: 0 | Status: DISCONTINUED | OUTPATIENT
Start: 2019-09-02 | End: 2019-09-02

## 2019-09-02 RX ORDER — CITALOPRAM 10 MG/1
10 TABLET, FILM COATED ORAL DAILY
Refills: 0 | Status: DISCONTINUED | OUTPATIENT
Start: 2019-09-02 | End: 2019-09-02

## 2019-09-02 RX ORDER — MIRTAZAPINE 45 MG/1
45 TABLET, ORALLY DISINTEGRATING ORAL AT BEDTIME
Refills: 0 | Status: DISCONTINUED | OUTPATIENT
Start: 2019-09-02 | End: 2019-09-06

## 2019-09-02 RX ORDER — MIRTAZAPINE 45 MG/1
45 TABLET, ORALLY DISINTEGRATING ORAL AT BEDTIME
Refills: 0 | Status: DISCONTINUED | OUTPATIENT
Start: 2019-09-02 | End: 2019-09-02

## 2019-09-02 RX ORDER — HYDROMORPHONE HYDROCHLORIDE 2 MG/ML
0.25 INJECTION INTRAMUSCULAR; INTRAVENOUS; SUBCUTANEOUS
Refills: 0 | Status: DISCONTINUED | OUTPATIENT
Start: 2019-09-02 | End: 2019-09-02

## 2019-09-02 RX ORDER — ONDANSETRON 8 MG/1
4 TABLET, FILM COATED ORAL ONCE
Refills: 0 | Status: DISCONTINUED | OUTPATIENT
Start: 2019-09-02 | End: 2019-09-02

## 2019-09-02 RX ORDER — MORPHINE SULFATE 50 MG/1
2 CAPSULE, EXTENDED RELEASE ORAL EVERY 6 HOURS
Refills: 0 | Status: DISCONTINUED | OUTPATIENT
Start: 2019-09-02 | End: 2019-09-06

## 2019-09-02 RX ORDER — CEFAZOLIN SODIUM 1 G
2000 VIAL (EA) INJECTION EVERY 8 HOURS
Refills: 0 | Status: COMPLETED | OUTPATIENT
Start: 2019-09-02 | End: 2019-09-03

## 2019-09-02 RX ORDER — PANTOPRAZOLE SODIUM 20 MG/1
40 TABLET, DELAYED RELEASE ORAL
Refills: 0 | Status: DISCONTINUED | OUTPATIENT
Start: 2019-09-02 | End: 2019-09-06

## 2019-09-02 RX ORDER — OXYCODONE HYDROCHLORIDE 5 MG/1
5 TABLET ORAL
Refills: 0 | Status: DISCONTINUED | OUTPATIENT
Start: 2019-09-02 | End: 2019-09-06

## 2019-09-02 RX ORDER — TRAMADOL HYDROCHLORIDE 50 MG/1
50 TABLET ORAL EVERY 4 HOURS
Refills: 0 | Status: DISCONTINUED | OUTPATIENT
Start: 2019-09-02 | End: 2019-09-06

## 2019-09-02 RX ORDER — ACETAMINOPHEN 500 MG
650 TABLET ORAL EVERY 6 HOURS
Refills: 0 | Status: DISCONTINUED | OUTPATIENT
Start: 2019-09-02 | End: 2019-09-02

## 2019-09-02 RX ORDER — METOPROLOL TARTRATE 50 MG
25 TABLET ORAL
Refills: 0 | Status: DISCONTINUED | OUTPATIENT
Start: 2019-09-02 | End: 2019-09-02

## 2019-09-02 RX ORDER — OLANZAPINE 15 MG/1
5 TABLET, FILM COATED ORAL AT BEDTIME
Refills: 0 | Status: DISCONTINUED | OUTPATIENT
Start: 2019-09-02 | End: 2019-09-06

## 2019-09-02 RX ORDER — OLANZAPINE 15 MG/1
5 TABLET, FILM COATED ORAL AT BEDTIME
Refills: 0 | Status: DISCONTINUED | OUTPATIENT
Start: 2019-09-02 | End: 2019-09-02

## 2019-09-02 RX ORDER — PANTOPRAZOLE SODIUM 20 MG/1
40 TABLET, DELAYED RELEASE ORAL
Refills: 0 | Status: DISCONTINUED | OUTPATIENT
Start: 2019-09-02 | End: 2019-09-02

## 2019-09-02 RX ADMIN — RIVASTIGMINE 1 PATCH: 4.6 PATCH, EXTENDED RELEASE TRANSDERMAL at 17:26

## 2019-09-02 RX ADMIN — Medication 25 MILLIGRAM(S): at 07:04

## 2019-09-02 RX ADMIN — OXYCODONE HYDROCHLORIDE 5 MILLIGRAM(S): 5 TABLET ORAL at 17:26

## 2019-09-02 RX ADMIN — MORPHINE SULFATE 2 MILLIGRAM(S): 50 CAPSULE, EXTENDED RELEASE ORAL at 04:21

## 2019-09-02 RX ADMIN — OLANZAPINE 5 MILLIGRAM(S): 15 TABLET, FILM COATED ORAL at 21:26

## 2019-09-02 RX ADMIN — OXYCODONE HYDROCHLORIDE 5 MILLIGRAM(S): 5 TABLET ORAL at 16:36

## 2019-09-02 RX ADMIN — MEMANTINE HYDROCHLORIDE 10 MILLIGRAM(S): 10 TABLET ORAL at 16:35

## 2019-09-02 RX ADMIN — Medication 100 MILLIGRAM(S): at 17:24

## 2019-09-02 RX ADMIN — MEMANTINE HYDROCHLORIDE 10 MILLIGRAM(S): 10 TABLET ORAL at 07:04

## 2019-09-02 RX ADMIN — ESCITALOPRAM OXALATE 5 MILLIGRAM(S): 10 TABLET, FILM COATED ORAL at 16:35

## 2019-09-02 RX ADMIN — RIVASTIGMINE 1 PATCH: 4.6 PATCH, EXTENDED RELEASE TRANSDERMAL at 07:55

## 2019-09-02 RX ADMIN — MIRTAZAPINE 45 MILLIGRAM(S): 45 TABLET, ORALLY DISINTEGRATING ORAL at 21:27

## 2019-09-02 RX ADMIN — MORPHINE SULFATE 2 MILLIGRAM(S): 50 CAPSULE, EXTENDED RELEASE ORAL at 04:45

## 2019-09-02 RX ADMIN — ATORVASTATIN CALCIUM 10 MILLIGRAM(S): 80 TABLET, FILM COATED ORAL at 21:27

## 2019-09-02 RX ADMIN — SENNA PLUS 2 TABLET(S): 8.6 TABLET ORAL at 21:26

## 2019-09-02 RX ADMIN — Medication 25 MILLIGRAM(S): at 16:36

## 2019-09-02 NOTE — H&P ADULT - ASSESSMENT
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     # L hip fx.   Ortho consult.   DVT/GI proph  RCRI: 0.9%. Pt medically optimized for surgery.     # HTN  cont lopressor    # HLD  cont statin.     # Dementia/anxiety  cont olanzapine, citalopram, namenda, remeron, exelon 89M hx of dementia sec to TBI 15 years ago, HTN, HLD, CKD pw s/p mechanical fall with L subcapital fx of femoral neck.     # L hip fx.   Ortho consult.   DVT/GI proph  RCRI: 0.9%. Pt medically optimized for surgery.     # HTN  cont lopressor    # HLD  cont statin.     # Dementia/anxiety  cont olanzapine, citalopram, namenda, remeron, exelon    # CKD   stable 89M hx of dementia sec to TBI 15 years ago, HTN, HLD, CKD pw s/p mechanical fall with L subcapital fx of femoral neck.     # L hip fx.   Ortho consult.   DVT/GI proph  RCRI: 0.9%. Pt medically optimized for surgery.   check UA to be complete. Family declined [place] unless absolutely necessary.     # HTN  cont lopressor    # HLD  cont statin.     # Dementia/anxiety  cont olanzapine, citalopram, namenda, remeron, exelon    # CKD   stable    # GOC  Pt is DNR/DNI. Family reaffirmed status but willing to suspend it for surgery.

## 2019-09-02 NOTE — H&P ADULT - HISTORY OF PRESENT ILLNESS
89M hx of dementia sec to TBI 15 years ago, HTN, HLD, CKD pw s/p mechanical fall. It was witnessed by HHA. Family reported that pt was fumbling with his pants and lost balance and fell on his buttock around 5 PM, no head injury, no LOC. Pt subsequently walked afterwards and even went up and down steps several times. Later in the evening, pt started to use walker sec to discomfort but after dinner, pt was unable to ambulate sec to L leg pain and came to ED. Unable to obtain hx sec to dementia. Pt denied any HA, CP, SOB, abd pain. Confirmed L hip and thigh pain.   Family denied any recent fevers, chills, SOB, cough, NVD abd pain or dysuria.   baseline ambulates well without walker. no hx of MI, CHF, DM, CVA

## 2019-09-02 NOTE — ED PROVIDER NOTE - OBJECTIVE STATEMENT
pt c/o L hip  and leg pain, moderate, sharp since fall tonight at about 5pm. pt was with aid, pt was adjusting pants and fell back.  did not hit head. no LOC. no dizziness/chest pain. initially was able to bear weight but later unable due to pain. no numbness/weakness

## 2019-09-02 NOTE — PATIENT PROFILE ADULT - LAST BOWEL MOVEMENT DATE
Hourly rounds were done and pt needs were met. Report will be given to day shift nurse and will continue to monitor. 01-Sep-2019

## 2019-09-02 NOTE — H&P ADULT - NSHPPHYSICALEXAM_GEN_ALL_CORE
Vital Signs Last 24 Hrs  T(C): 37.1 (01 Sep 2019 20:36), Max: 37.1 (01 Sep 2019 20:36)  T(F): 98.8 (01 Sep 2019 20:36), Max: 98.8 (01 Sep 2019 20:36)  HR: 62 (01 Sep 2019 20:36) (62 - 62)  BP: 143/80 (01 Sep 2019 20:36) (143/80 - 143/80)  BP(mean): --  RR: 18 (01 Sep 2019 20:36) (18 - 18)  SpO2: 97% (01 Sep 2019 20:36) (97% - 97%)  Daily Height in cm: 157.48 (01 Sep 2019 20:36)    Daily   CAPILLARY BLOOD GLUCOSE        I&O's Summary      GENERAL: NAD  HEAD:  Normocephalic  EYES: EOMI, PERRLA, conjunctiva and sclera clear  ENMT: No tonsillar erythema, exudates, or enlargement; Moist mucous membranes, No lesions  NECK: Supple, No JVD, no bruit, normal thyroid  NERVOUS SYSTEM:  Alert grossly moves all fours including L hip but with pain.   CHEST/LUNG: Clear to auscultation bilaterally; No rales, rhonchi, wheezing, or rubs  HEART: Regular rate and rhythm; No murmurs, rubs, or gallops  ABDOMEN: Soft, Nontender, Nondistended; Bowel sounds present  EXTREMITIES:  2+ Peripheral Pulses, No clubbing, cyanosis, or edema  LYMPH: No lymphadenopathy noted  SKIN: No rashes or lesions

## 2019-09-02 NOTE — PRE-OP CHECKLIST - SURGICAL CONSENT
spoke with Daughter the healthcare proxy regarding consent. spoke with Daughter the healthcare proxy regarding consent./done

## 2019-09-02 NOTE — PROVIDER CONTACT NOTE (OTHER) - ASSESSMENT
Patient needs closer monitoring, fresh post op for hip fracture. patient confused. history of dementia.

## 2019-09-02 NOTE — ED PROVIDER NOTE - CLINICAL SUMMARY MEDICAL DECISION MAKING FREE TEXT BOX
L hip pain p mechanical fall. no other trauma noted. check xray hip/femur.  tyelnol for pain. reassess    update: xray Hip with possible fx, unclear. ct hip obtained showing fx. pt will be admitted. inpt ortho eval.

## 2019-09-02 NOTE — H&P ADULT - NSHPLABSRESULTS_GEN_ALL_CORE
10.9   12.45 )-----------( 199      ( 02 Sep 2019 00:50 )             33.4       09-02    138  |  104  |  28<H>  ----------------------------<  126<H>  4.4   |  26  |  2.07<H>    Ca    9.8      02 Sep 2019 00:50    TPro  6.5  /  Alb  3.4  /  TBili  0.4  /  DBili  x   /  AST  23  /  ALT  18  /  AlkPhos  96  09-02         LIVER FUNCTIONS - ( 02 Sep 2019 00:50 )  Alb: 3.4 g/dL / Pro: 6.5 g/dL / ALK PHOS: 96 U/L / ALT: 18 U/L DA / AST: 23 U/L / GGT: x               PT/INR - ( 02 Sep 2019 00:50 )   PT: 13.0 sec;   INR: 1.16 ratio         PTT - ( 02 Sep 2019 00:50 )  PTT:28.1 sec        EKG: NSR at 65bpm, RBBB      CXR: wet read NAPD    < from: CT Pelvis Bony Only No Cont (09.02.19 @ 00:05) >    IMPRESSION:   Minimally impacted subcapital fracture in the lateral aspect of the left   femoral neck.  Small left lipohemarthrosis.    < end of copied text >

## 2019-09-02 NOTE — ED PROVIDER NOTE - MUSCULOSKELETAL, MLM
Spine appears normal, range of motion is not limited, no c/t/L spine ttp. L hip: pain with ROM, mild lateral hip ttp. rest of femur, knee, lower leg nontender ,no swelling. 2+ dp, nl distal sensation

## 2019-09-02 NOTE — H&P ADULT - NSICDXPASTSURGICALHX_GEN_ALL_CORE_FT
PAST SURGICAL HISTORY:  H/O abdominal surgery with partial gastrectomy sec to ruptured stomach    History of cholecystectomy

## 2019-09-02 NOTE — H&P ADULT - NSHPREVIEWOFSYSTEMS_GEN_ALL_CORE
unable to obtain reliably sec to dementia.     IMPROVE VTE Individual Risk Assessment    RISK                                                                Points    [  ] Previous VTE                                                  3    [  ] Thrombophilia                                               2    [  2] Lower limb paralysis                                      2        (unable to hold up >15 seconds)      [  ] Current Cancer                                              2         (within 6 months)    [ 1 ] Immobilization > 24 hrs                                1    [  ] ICU/CCU stay > 24 hours                              1    [  1] Age > 60                                                      1    IMPROVE VTE Score _____4____    IMPROVE Score 0-1: Low Risk, No VTE prophylaxis required for most patients, encourage ambulation.   IMPROVE Score 2-3: At risk, pharmacologic VTE prophylaxis is indicated for most patients (in the absence of a contraindication)  IMPROVE Score > or = 4: High Risk, pharmacologic VTE prophylaxis is indicated for most patients (in the absence of a contraindication)

## 2019-09-03 LAB
ANION GAP SERPL CALC-SCNC: 7 MMOL/L — SIGNIFICANT CHANGE UP (ref 5–17)
BUN SERPL-MCNC: 24 MG/DL — HIGH (ref 7–23)
CALCIUM SERPL-MCNC: 10 MG/DL — SIGNIFICANT CHANGE UP (ref 8.4–10.5)
CHLORIDE SERPL-SCNC: 106 MMOL/L — SIGNIFICANT CHANGE UP (ref 96–108)
CO2 SERPL-SCNC: 25 MMOL/L — SIGNIFICANT CHANGE UP (ref 22–31)
CREAT SERPL-MCNC: 1.92 MG/DL — HIGH (ref 0.5–1.3)
GLUCOSE SERPL-MCNC: 100 MG/DL — HIGH (ref 70–99)
HCT VFR BLD CALC: 35.4 % — LOW (ref 39–50)
HGB BLD-MCNC: 11.5 G/DL — LOW (ref 13–17)
MCHC RBC-ENTMCNC: 32.1 PG — SIGNIFICANT CHANGE UP (ref 27–34)
MCHC RBC-ENTMCNC: 32.5 GM/DL — SIGNIFICANT CHANGE UP (ref 32–36)
MCV RBC AUTO: 98.9 FL — SIGNIFICANT CHANGE UP (ref 80–100)
NRBC # BLD: 0 /100 WBCS — SIGNIFICANT CHANGE UP (ref 0–0)
PLATELET # BLD AUTO: 182 K/UL — SIGNIFICANT CHANGE UP (ref 150–400)
POTASSIUM SERPL-MCNC: 5.1 MMOL/L — SIGNIFICANT CHANGE UP (ref 3.5–5.3)
POTASSIUM SERPL-SCNC: 5.1 MMOL/L — SIGNIFICANT CHANGE UP (ref 3.5–5.3)
RBC # BLD: 3.58 M/UL — LOW (ref 4.2–5.8)
RBC # FLD: 12.2 % — SIGNIFICANT CHANGE UP (ref 10.3–14.5)
SODIUM SERPL-SCNC: 138 MMOL/L — SIGNIFICANT CHANGE UP (ref 135–145)
WBC # BLD: 11.13 K/UL — HIGH (ref 3.8–10.5)
WBC # FLD AUTO: 11.13 K/UL — HIGH (ref 3.8–10.5)

## 2019-09-03 PROCEDURE — 99233 SBSQ HOSP IP/OBS HIGH 50: CPT

## 2019-09-03 RX ORDER — RIVASTIGMINE 4.6 MG/24H
1 PATCH, EXTENDED RELEASE TRANSDERMAL EVERY 24 HOURS
Refills: 0 | Status: DISCONTINUED | OUTPATIENT
Start: 2019-09-03 | End: 2019-09-06

## 2019-09-03 RX ADMIN — Medication 25 MILLIGRAM(S): at 05:31

## 2019-09-03 RX ADMIN — Medication 100 MILLIGRAM(S): at 02:48

## 2019-09-03 RX ADMIN — MEMANTINE HYDROCHLORIDE 10 MILLIGRAM(S): 10 TABLET ORAL at 05:31

## 2019-09-03 RX ADMIN — OLANZAPINE 5 MILLIGRAM(S): 15 TABLET, FILM COATED ORAL at 22:15

## 2019-09-03 RX ADMIN — ENOXAPARIN SODIUM 30 MILLIGRAM(S): 100 INJECTION SUBCUTANEOUS at 12:03

## 2019-09-03 RX ADMIN — Medication 25 MILLIGRAM(S): at 17:45

## 2019-09-03 RX ADMIN — MIRTAZAPINE 45 MILLIGRAM(S): 45 TABLET, ORALLY DISINTEGRATING ORAL at 22:17

## 2019-09-03 RX ADMIN — OXYCODONE HYDROCHLORIDE 5 MILLIGRAM(S): 5 TABLET ORAL at 13:00

## 2019-09-03 RX ADMIN — MEMANTINE HYDROCHLORIDE 10 MILLIGRAM(S): 10 TABLET ORAL at 17:44

## 2019-09-03 RX ADMIN — PANTOPRAZOLE SODIUM 40 MILLIGRAM(S): 20 TABLET, DELAYED RELEASE ORAL at 05:31

## 2019-09-03 RX ADMIN — ESCITALOPRAM OXALATE 5 MILLIGRAM(S): 10 TABLET, FILM COATED ORAL at 12:03

## 2019-09-03 RX ADMIN — ATORVASTATIN CALCIUM 10 MILLIGRAM(S): 80 TABLET, FILM COATED ORAL at 22:14

## 2019-09-03 RX ADMIN — SENNA PLUS 2 TABLET(S): 8.6 TABLET ORAL at 22:15

## 2019-09-03 RX ADMIN — RIVASTIGMINE 1 PATCH: 4.6 PATCH, EXTENDED RELEASE TRANSDERMAL at 05:35

## 2019-09-03 RX ADMIN — OXYCODONE HYDROCHLORIDE 5 MILLIGRAM(S): 5 TABLET ORAL at 12:03

## 2019-09-04 LAB
ANION GAP SERPL CALC-SCNC: 6 MMOL/L — SIGNIFICANT CHANGE UP (ref 5–17)
APPEARANCE UR: CLEAR — SIGNIFICANT CHANGE UP
BACTERIA # UR AUTO: ABNORMAL /HPF
BILIRUB UR-MCNC: NEGATIVE — SIGNIFICANT CHANGE UP
BUN SERPL-MCNC: 23 MG/DL — SIGNIFICANT CHANGE UP (ref 7–23)
CALCIUM SERPL-MCNC: 9.3 MG/DL — SIGNIFICANT CHANGE UP (ref 8.4–10.5)
CHLORIDE SERPL-SCNC: 107 MMOL/L — SIGNIFICANT CHANGE UP (ref 96–108)
CO2 SERPL-SCNC: 25 MMOL/L — SIGNIFICANT CHANGE UP (ref 22–31)
COLOR SPEC: YELLOW — SIGNIFICANT CHANGE UP
CREAT SERPL-MCNC: 1.93 MG/DL — HIGH (ref 0.5–1.3)
DIFF PNL FLD: NEGATIVE — SIGNIFICANT CHANGE UP
EPI CELLS # UR: SIGNIFICANT CHANGE UP
GLUCOSE SERPL-MCNC: 97 MG/DL — SIGNIFICANT CHANGE UP (ref 70–99)
GLUCOSE UR QL: NEGATIVE — SIGNIFICANT CHANGE UP
HCT VFR BLD CALC: 32.3 % — LOW (ref 39–50)
HGB BLD-MCNC: 10.6 G/DL — LOW (ref 13–17)
KETONES UR-MCNC: NEGATIVE — SIGNIFICANT CHANGE UP
LEUKOCYTE ESTERASE UR-ACNC: ABNORMAL
MCHC RBC-ENTMCNC: 31.7 PG — SIGNIFICANT CHANGE UP (ref 27–34)
MCHC RBC-ENTMCNC: 32.8 GM/DL — SIGNIFICANT CHANGE UP (ref 32–36)
MCV RBC AUTO: 96.7 FL — SIGNIFICANT CHANGE UP (ref 80–100)
NITRITE UR-MCNC: NEGATIVE — SIGNIFICANT CHANGE UP
NRBC # BLD: 0 /100 WBCS — SIGNIFICANT CHANGE UP (ref 0–0)
PH UR: 7 — SIGNIFICANT CHANGE UP (ref 5–8)
PLATELET # BLD AUTO: 182 K/UL — SIGNIFICANT CHANGE UP (ref 150–400)
POTASSIUM SERPL-MCNC: 3.9 MMOL/L — SIGNIFICANT CHANGE UP (ref 3.5–5.3)
POTASSIUM SERPL-SCNC: 3.9 MMOL/L — SIGNIFICANT CHANGE UP (ref 3.5–5.3)
PROT UR-MCNC: 30 MG/DL
RBC # BLD: 3.34 M/UL — LOW (ref 4.2–5.8)
RBC # FLD: 12.5 % — SIGNIFICANT CHANGE UP (ref 10.3–14.5)
RBC CASTS # UR COMP ASSIST: SIGNIFICANT CHANGE UP /HPF (ref 0–4)
SODIUM SERPL-SCNC: 138 MMOL/L — SIGNIFICANT CHANGE UP (ref 135–145)
SP GR SPEC: 1.01 — SIGNIFICANT CHANGE UP (ref 1.01–1.02)
UROBILINOGEN FLD QL: NEGATIVE — SIGNIFICANT CHANGE UP
WBC # BLD: 10.73 K/UL — HIGH (ref 3.8–10.5)
WBC # FLD AUTO: 10.73 K/UL — HIGH (ref 3.8–10.5)
WBC UR QL: SIGNIFICANT CHANGE UP /HPF (ref 0–5)

## 2019-09-04 PROCEDURE — 71045 X-RAY EXAM CHEST 1 VIEW: CPT | Mod: 26

## 2019-09-04 PROCEDURE — 99233 SBSQ HOSP IP/OBS HIGH 50: CPT

## 2019-09-04 RX ORDER — CEFEPIME 1 G/1
2000 INJECTION, POWDER, FOR SOLUTION INTRAMUSCULAR; INTRAVENOUS EVERY 12 HOURS
Refills: 0 | Status: DISCONTINUED | OUTPATIENT
Start: 2019-09-04 | End: 2019-09-06

## 2019-09-04 RX ADMIN — Medication 650 MILLIGRAM(S): at 06:25

## 2019-09-04 RX ADMIN — SODIUM CHLORIDE 75 MILLILITER(S): 9 INJECTION, SOLUTION INTRAVENOUS at 06:25

## 2019-09-04 RX ADMIN — Medication 650 MILLIGRAM(S): at 07:25

## 2019-09-04 RX ADMIN — MEMANTINE HYDROCHLORIDE 10 MILLIGRAM(S): 10 TABLET ORAL at 06:25

## 2019-09-04 RX ADMIN — ESCITALOPRAM OXALATE 5 MILLIGRAM(S): 10 TABLET, FILM COATED ORAL at 12:11

## 2019-09-04 RX ADMIN — RIVASTIGMINE 1 PATCH: 4.6 PATCH, EXTENDED RELEASE TRANSDERMAL at 19:30

## 2019-09-04 RX ADMIN — Medication 25 MILLIGRAM(S): at 18:22

## 2019-09-04 RX ADMIN — ATORVASTATIN CALCIUM 10 MILLIGRAM(S): 80 TABLET, FILM COATED ORAL at 21:10

## 2019-09-04 RX ADMIN — MIRTAZAPINE 45 MILLIGRAM(S): 45 TABLET, ORALLY DISINTEGRATING ORAL at 21:10

## 2019-09-04 RX ADMIN — RIVASTIGMINE 1 PATCH: 4.6 PATCH, EXTENDED RELEASE TRANSDERMAL at 07:16

## 2019-09-04 RX ADMIN — MEMANTINE HYDROCHLORIDE 10 MILLIGRAM(S): 10 TABLET ORAL at 18:22

## 2019-09-04 RX ADMIN — OLANZAPINE 5 MILLIGRAM(S): 15 TABLET, FILM COATED ORAL at 21:09

## 2019-09-04 RX ADMIN — SENNA PLUS 2 TABLET(S): 8.6 TABLET ORAL at 21:09

## 2019-09-04 RX ADMIN — ENOXAPARIN SODIUM 30 MILLIGRAM(S): 100 INJECTION SUBCUTANEOUS at 12:11

## 2019-09-04 RX ADMIN — PANTOPRAZOLE SODIUM 40 MILLIGRAM(S): 20 TABLET, DELAYED RELEASE ORAL at 06:25

## 2019-09-04 RX ADMIN — CEFEPIME 100 MILLIGRAM(S): 1 INJECTION, POWDER, FOR SOLUTION INTRAMUSCULAR; INTRAVENOUS at 18:22

## 2019-09-04 RX ADMIN — Medication 25 MILLIGRAM(S): at 06:25

## 2019-09-04 NOTE — PHYSICAL THERAPY INITIAL EVALUATION ADULT - ADDITIONAL COMMENTS
pt lives in private house w/live in aides due to dementia, 5 ilene w/rail. pt will stay in 1st floor. pt ambulates independent, needs assistance w/ADL's due to dementia

## 2019-09-05 LAB
ANION GAP SERPL CALC-SCNC: 7 MMOL/L — SIGNIFICANT CHANGE UP (ref 5–17)
BASOPHILS # BLD AUTO: 0.02 K/UL — SIGNIFICANT CHANGE UP (ref 0–0.2)
BASOPHILS NFR BLD AUTO: 0.3 % — SIGNIFICANT CHANGE UP (ref 0–2)
BUN SERPL-MCNC: 26 MG/DL — HIGH (ref 7–23)
CALCIUM SERPL-MCNC: 9.7 MG/DL — SIGNIFICANT CHANGE UP (ref 8.4–10.5)
CHLORIDE SERPL-SCNC: 107 MMOL/L — SIGNIFICANT CHANGE UP (ref 96–108)
CO2 SERPL-SCNC: 25 MMOL/L — SIGNIFICANT CHANGE UP (ref 22–31)
CREAT SERPL-MCNC: 2.01 MG/DL — HIGH (ref 0.5–1.3)
EOSINOPHIL # BLD AUTO: 0.22 K/UL — SIGNIFICANT CHANGE UP (ref 0–0.5)
EOSINOPHIL NFR BLD AUTO: 2.8 % — SIGNIFICANT CHANGE UP (ref 0–6)
GLUCOSE SERPL-MCNC: 108 MG/DL — HIGH (ref 70–99)
HCT VFR BLD CALC: 31.7 % — LOW (ref 39–50)
HGB BLD-MCNC: 10.3 G/DL — LOW (ref 13–17)
IMM GRANULOCYTES NFR BLD AUTO: 0.4 % — SIGNIFICANT CHANGE UP (ref 0–1.5)
LYMPHOCYTES # BLD AUTO: 1.16 K/UL — SIGNIFICANT CHANGE UP (ref 1–3.3)
LYMPHOCYTES # BLD AUTO: 14.5 % — SIGNIFICANT CHANGE UP (ref 13–44)
MCHC RBC-ENTMCNC: 31.3 PG — SIGNIFICANT CHANGE UP (ref 27–34)
MCHC RBC-ENTMCNC: 32.5 GM/DL — SIGNIFICANT CHANGE UP (ref 32–36)
MCV RBC AUTO: 96.4 FL — SIGNIFICANT CHANGE UP (ref 80–100)
MONOCYTES # BLD AUTO: 0.89 K/UL — SIGNIFICANT CHANGE UP (ref 0–0.9)
MONOCYTES NFR BLD AUTO: 11.1 % — SIGNIFICANT CHANGE UP (ref 2–14)
NEUTROPHILS # BLD AUTO: 5.67 K/UL — SIGNIFICANT CHANGE UP (ref 1.8–7.4)
NEUTROPHILS NFR BLD AUTO: 70.9 % — SIGNIFICANT CHANGE UP (ref 43–77)
NRBC # BLD: 0 /100 WBCS — SIGNIFICANT CHANGE UP (ref 0–0)
PLATELET # BLD AUTO: 191 K/UL — SIGNIFICANT CHANGE UP (ref 150–400)
POTASSIUM SERPL-MCNC: 4 MMOL/L — SIGNIFICANT CHANGE UP (ref 3.5–5.3)
POTASSIUM SERPL-SCNC: 4 MMOL/L — SIGNIFICANT CHANGE UP (ref 3.5–5.3)
RBC # BLD: 3.29 M/UL — LOW (ref 4.2–5.8)
RBC # FLD: 12.4 % — SIGNIFICANT CHANGE UP (ref 10.3–14.5)
SODIUM SERPL-SCNC: 139 MMOL/L — SIGNIFICANT CHANGE UP (ref 135–145)
WBC # BLD: 7.99 K/UL — SIGNIFICANT CHANGE UP (ref 3.8–10.5)
WBC # FLD AUTO: 7.99 K/UL — SIGNIFICANT CHANGE UP (ref 3.8–10.5)

## 2019-09-05 PROCEDURE — 99233 SBSQ HOSP IP/OBS HIGH 50: CPT

## 2019-09-05 RX ADMIN — RIVASTIGMINE 1 PATCH: 4.6 PATCH, EXTENDED RELEASE TRANSDERMAL at 07:59

## 2019-09-05 RX ADMIN — PANTOPRAZOLE SODIUM 40 MILLIGRAM(S): 20 TABLET, DELAYED RELEASE ORAL at 06:19

## 2019-09-05 RX ADMIN — CEFEPIME 100 MILLIGRAM(S): 1 INJECTION, POWDER, FOR SOLUTION INTRAMUSCULAR; INTRAVENOUS at 16:42

## 2019-09-05 RX ADMIN — MIRTAZAPINE 45 MILLIGRAM(S): 45 TABLET, ORALLY DISINTEGRATING ORAL at 21:35

## 2019-09-05 RX ADMIN — CEFEPIME 100 MILLIGRAM(S): 1 INJECTION, POWDER, FOR SOLUTION INTRAMUSCULAR; INTRAVENOUS at 05:46

## 2019-09-05 RX ADMIN — RIVASTIGMINE 1 PATCH: 4.6 PATCH, EXTENDED RELEASE TRANSDERMAL at 06:57

## 2019-09-05 RX ADMIN — SODIUM CHLORIDE 75 MILLILITER(S): 9 INJECTION, SOLUTION INTRAVENOUS at 06:19

## 2019-09-05 RX ADMIN — Medication 0.5 MILLILITER(S): at 22:43

## 2019-09-05 RX ADMIN — Medication 25 MILLIGRAM(S): at 05:46

## 2019-09-05 RX ADMIN — RIVASTIGMINE 1 PATCH: 4.6 PATCH, EXTENDED RELEASE TRANSDERMAL at 20:55

## 2019-09-05 RX ADMIN — ESCITALOPRAM OXALATE 5 MILLIGRAM(S): 10 TABLET, FILM COATED ORAL at 12:58

## 2019-09-05 RX ADMIN — OLANZAPINE 5 MILLIGRAM(S): 15 TABLET, FILM COATED ORAL at 21:35

## 2019-09-05 RX ADMIN — Medication 25 MILLIGRAM(S): at 16:42

## 2019-09-05 RX ADMIN — SENNA PLUS 2 TABLET(S): 8.6 TABLET ORAL at 21:35

## 2019-09-05 RX ADMIN — SODIUM CHLORIDE 75 MILLILITER(S): 9 INJECTION, SOLUTION INTRAVENOUS at 18:13

## 2019-09-05 RX ADMIN — OXYCODONE HYDROCHLORIDE 5 MILLIGRAM(S): 5 TABLET ORAL at 06:19

## 2019-09-05 RX ADMIN — ATORVASTATIN CALCIUM 10 MILLIGRAM(S): 80 TABLET, FILM COATED ORAL at 21:35

## 2019-09-05 RX ADMIN — SODIUM CHLORIDE 75 MILLILITER(S): 9 INJECTION, SOLUTION INTRAVENOUS at 21:35

## 2019-09-05 RX ADMIN — RIVASTIGMINE 1 PATCH: 4.6 PATCH, EXTENDED RELEASE TRANSDERMAL at 07:58

## 2019-09-05 RX ADMIN — MEMANTINE HYDROCHLORIDE 10 MILLIGRAM(S): 10 TABLET ORAL at 16:42

## 2019-09-05 RX ADMIN — MEMANTINE HYDROCHLORIDE 10 MILLIGRAM(S): 10 TABLET ORAL at 05:46

## 2019-09-05 RX ADMIN — OXYCODONE HYDROCHLORIDE 5 MILLIGRAM(S): 5 TABLET ORAL at 07:02

## 2019-09-05 RX ADMIN — ENOXAPARIN SODIUM 30 MILLIGRAM(S): 100 INJECTION SUBCUTANEOUS at 12:58

## 2019-09-05 RX ADMIN — RIVASTIGMINE 1 PATCH: 4.6 PATCH, EXTENDED RELEASE TRANSDERMAL at 06:58

## 2019-09-05 NOTE — CHART NOTE - NSCHARTNOTEFT_GEN_A_CORE
Pt may have been exposed to the patient with confirmed measle in the emergency room. As per [place] department of health, pt need to receive one dose of MMR.  Pt has advanced dementia, spoke to pt's daughter and obtained consent for giving MMR. Pt may have been exposed to the patient with confirmed measle in the emergency room. As per protocol of [facility], pt need to receive one dose of MMR.  Pt has advanced dementia, spoke to pt's daughter and obtained consent for giving MMR.

## 2019-09-06 ENCOUNTER — TRANSCRIPTION ENCOUNTER (OUTPATIENT)
Age: 84
End: 2019-09-06

## 2019-09-06 VITALS
TEMPERATURE: 99 F | DIASTOLIC BLOOD PRESSURE: 74 MMHG | SYSTOLIC BLOOD PRESSURE: 154 MMHG | HEART RATE: 81 BPM | RESPIRATION RATE: 17 BRPM | OXYGEN SATURATION: 95 % | WEIGHT: 175.49 LBS

## 2019-09-06 LAB
ANION GAP SERPL CALC-SCNC: 4 MMOL/L — LOW (ref 5–17)
BASOPHILS # BLD AUTO: 0.02 K/UL — SIGNIFICANT CHANGE UP (ref 0–0.2)
BASOPHILS NFR BLD AUTO: 0.2 % — SIGNIFICANT CHANGE UP (ref 0–2)
BUN SERPL-MCNC: 29 MG/DL — HIGH (ref 7–23)
CALCIUM SERPL-MCNC: 9.5 MG/DL — SIGNIFICANT CHANGE UP (ref 8.4–10.5)
CHLORIDE SERPL-SCNC: 106 MMOL/L — SIGNIFICANT CHANGE UP (ref 96–108)
CO2 SERPL-SCNC: 26 MMOL/L — SIGNIFICANT CHANGE UP (ref 22–31)
CREAT SERPL-MCNC: 1.98 MG/DL — HIGH (ref 0.5–1.3)
EOSINOPHIL # BLD AUTO: 0.1 K/UL — SIGNIFICANT CHANGE UP (ref 0–0.5)
EOSINOPHIL NFR BLD AUTO: 1.2 % — SIGNIFICANT CHANGE UP (ref 0–6)
GLUCOSE SERPL-MCNC: 107 MG/DL — HIGH (ref 70–99)
HCT VFR BLD CALC: 30.6 % — LOW (ref 39–50)
HGB BLD-MCNC: 10 G/DL — LOW (ref 13–17)
IMM GRANULOCYTES NFR BLD AUTO: 0.4 % — SIGNIFICANT CHANGE UP (ref 0–1.5)
LYMPHOCYTES # BLD AUTO: 0.99 K/UL — LOW (ref 1–3.3)
LYMPHOCYTES # BLD AUTO: 11.8 % — LOW (ref 13–44)
MCHC RBC-ENTMCNC: 31.4 PG — SIGNIFICANT CHANGE UP (ref 27–34)
MCHC RBC-ENTMCNC: 32.7 GM/DL — SIGNIFICANT CHANGE UP (ref 32–36)
MCV RBC AUTO: 96.2 FL — SIGNIFICANT CHANGE UP (ref 80–100)
MONOCYTES # BLD AUTO: 0.96 K/UL — HIGH (ref 0–0.9)
MONOCYTES NFR BLD AUTO: 11.4 % — SIGNIFICANT CHANGE UP (ref 2–14)
NEUTROPHILS # BLD AUTO: 6.29 K/UL — SIGNIFICANT CHANGE UP (ref 1.8–7.4)
NEUTROPHILS NFR BLD AUTO: 75 % — SIGNIFICANT CHANGE UP (ref 43–77)
NRBC # BLD: 0 /100 WBCS — SIGNIFICANT CHANGE UP (ref 0–0)
PLATELET # BLD AUTO: 193 K/UL — SIGNIFICANT CHANGE UP (ref 150–400)
POTASSIUM SERPL-MCNC: 5.2 MMOL/L — SIGNIFICANT CHANGE UP (ref 3.5–5.3)
POTASSIUM SERPL-SCNC: 5.2 MMOL/L — SIGNIFICANT CHANGE UP (ref 3.5–5.3)
RBC # BLD: 3.18 M/UL — LOW (ref 4.2–5.8)
RBC # FLD: 12.3 % — SIGNIFICANT CHANGE UP (ref 10.3–14.5)
SODIUM SERPL-SCNC: 136 MMOL/L — SIGNIFICANT CHANGE UP (ref 135–145)
WBC # BLD: 8.39 K/UL — SIGNIFICANT CHANGE UP (ref 3.8–10.5)
WBC # FLD AUTO: 8.39 K/UL — SIGNIFICANT CHANGE UP (ref 3.8–10.5)

## 2019-09-06 PROCEDURE — 72170 X-RAY EXAM OF PELVIS: CPT

## 2019-09-06 PROCEDURE — 81001 URINALYSIS AUTO W/SCOPE: CPT

## 2019-09-06 PROCEDURE — 80053 COMPREHEN METABOLIC PANEL: CPT

## 2019-09-06 PROCEDURE — 87040 BLOOD CULTURE FOR BACTERIA: CPT

## 2019-09-06 PROCEDURE — 72192 CT PELVIS W/O DYE: CPT

## 2019-09-06 PROCEDURE — 97162 PT EVAL MOD COMPLEX 30 MIN: CPT

## 2019-09-06 PROCEDURE — 36000 PLACE NEEDLE IN VEIN: CPT

## 2019-09-06 PROCEDURE — 86850 RBC ANTIBODY SCREEN: CPT

## 2019-09-06 PROCEDURE — 73552 X-RAY EXAM OF FEMUR 2/>: CPT

## 2019-09-06 PROCEDURE — 36415 COLL VENOUS BLD VENIPUNCTURE: CPT

## 2019-09-06 PROCEDURE — 97116 GAIT TRAINING THERAPY: CPT

## 2019-09-06 PROCEDURE — 86900 BLOOD TYPING SEROLOGIC ABO: CPT

## 2019-09-06 PROCEDURE — 93005 ELECTROCARDIOGRAM TRACING: CPT

## 2019-09-06 PROCEDURE — 85027 COMPLETE CBC AUTOMATED: CPT

## 2019-09-06 PROCEDURE — 90707 MMR VACCINE SC: CPT

## 2019-09-06 PROCEDURE — 99285 EMERGENCY DEPT VISIT HI MDM: CPT | Mod: 25

## 2019-09-06 PROCEDURE — 73502 X-RAY EXAM HIP UNI 2-3 VIEWS: CPT

## 2019-09-06 PROCEDURE — C1713: CPT

## 2019-09-06 PROCEDURE — 80048 BASIC METABOLIC PNL TOTAL CA: CPT

## 2019-09-06 PROCEDURE — 99238 HOSP IP/OBS DSCHRG MGMT 30/<: CPT

## 2019-09-06 PROCEDURE — 71045 X-RAY EXAM CHEST 1 VIEW: CPT

## 2019-09-06 PROCEDURE — 76000 FLUOROSCOPY <1 HR PHYS/QHP: CPT

## 2019-09-06 PROCEDURE — 85610 PROTHROMBIN TIME: CPT

## 2019-09-06 PROCEDURE — 86901 BLOOD TYPING SEROLOGIC RH(D): CPT

## 2019-09-06 PROCEDURE — 85730 THROMBOPLASTIN TIME PARTIAL: CPT

## 2019-09-06 RX ORDER — MEMANTINE HYDROCHLORIDE 10 MG/1
1 TABLET ORAL
Qty: 0 | Refills: 0 | DISCHARGE

## 2019-09-06 RX ORDER — METOPROLOL TARTRATE 50 MG
1 TABLET ORAL
Qty: 60 | Refills: 0
Start: 2019-09-06 | End: 2019-10-05

## 2019-09-06 RX ORDER — TRAMADOL HYDROCHLORIDE 50 MG/1
1 TABLET ORAL
Qty: 0 | Refills: 0 | DISCHARGE
Start: 2019-09-06

## 2019-09-06 RX ORDER — ASPIRIN/CALCIUM CARB/MAGNESIUM 324 MG
1 TABLET ORAL
Qty: 84 | Refills: 0
Start: 2019-09-06 | End: 2019-10-17

## 2019-09-06 RX ORDER — ACETAMINOPHEN 500 MG
2 TABLET ORAL
Qty: 0 | Refills: 0 | DISCHARGE
Start: 2019-09-06

## 2019-09-06 RX ORDER — SENNA PLUS 8.6 MG/1
2 TABLET ORAL
Qty: 0 | Refills: 0 | DISCHARGE
Start: 2019-09-06

## 2019-09-06 RX ORDER — CEFUROXIME AXETIL 250 MG
500 TABLET ORAL DAILY
Refills: 0 | Status: DISCONTINUED | OUTPATIENT
Start: 2019-09-07 | End: 2019-09-06

## 2019-09-06 RX ORDER — ASPIRIN/CALCIUM CARB/MAGNESIUM 324 MG
1 TABLET ORAL
Qty: 30 | Refills: 0
Start: 2019-09-06 | End: 2019-10-05

## 2019-09-06 RX ADMIN — RIVASTIGMINE 1 PATCH: 4.6 PATCH, EXTENDED RELEASE TRANSDERMAL at 05:11

## 2019-09-06 RX ADMIN — MEMANTINE HYDROCHLORIDE 10 MILLIGRAM(S): 10 TABLET ORAL at 05:10

## 2019-09-06 RX ADMIN — Medication 25 MILLIGRAM(S): at 05:11

## 2019-09-06 RX ADMIN — RIVASTIGMINE 1 PATCH: 4.6 PATCH, EXTENDED RELEASE TRANSDERMAL at 09:03

## 2019-09-06 RX ADMIN — RIVASTIGMINE 1 PATCH: 4.6 PATCH, EXTENDED RELEASE TRANSDERMAL at 06:28

## 2019-09-06 RX ADMIN — ESCITALOPRAM OXALATE 5 MILLIGRAM(S): 10 TABLET, FILM COATED ORAL at 11:41

## 2019-09-06 RX ADMIN — ENOXAPARIN SODIUM 30 MILLIGRAM(S): 100 INJECTION SUBCUTANEOUS at 11:41

## 2019-09-06 RX ADMIN — PANTOPRAZOLE SODIUM 40 MILLIGRAM(S): 20 TABLET, DELAYED RELEASE ORAL at 05:11

## 2019-09-06 RX ADMIN — CEFEPIME 100 MILLIGRAM(S): 1 INJECTION, POWDER, FOR SOLUTION INTRAMUSCULAR; INTRAVENOUS at 05:10

## 2019-09-06 NOTE — DISCHARGE NOTE NURSING/CASE MANAGEMENT/SOCIAL WORK - PATIENT PORTAL LINK FT
You can access the FollowMyHealth Patient Portal offered by [facility] by registering at the following website: [email or web address]. By joining [employer]’s FollowMyHealth portal, you will also be able to view your health information using other applications (apps) compatible with our system.

## 2019-09-06 NOTE — PROGRESS NOTE ADULT - REASON FOR ADMISSION
s/p fall with L hip fx.

## 2019-09-06 NOTE — DISCHARGE NOTE PROVIDER - HOSPITAL COURSE
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 Pt. is stable for discharge home, full weight bearing as tolerated,     visiting nurse service and home aides. Pt. will follow ortho surgery recommendations - aspirin 81 mg po x 6 weeks, follow up in 2 weeks     with Dr. Figueroa for staples removal.

## 2019-09-06 NOTE — PROGRESS NOTE ADULT - ASSESSMENT
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     # Fever secondary to PNA - suspect complicated PNA  - fever resolved  CXR: developing L basilar infiltrates  - cont cefepime 2G q12  - blood cx: negative and UA negative  - monitor fever curve     # mechanical call resulted in L hip femoral neck fx    # Left hip femoral neck fracture s/p opening pinning  follow Ortho   pain control   wbat LLE   per PT: Home with PT  DVT/GI proph    # HTN  cont lopressor    # HLD  cont statin.     # hyperkalemia - resolved   - K 3.9    # Dementia/anxiety  cont olanzapine, citalopram, namenda, remeron, exelon    # CKD III - IV  stable    # DISPO: DC tomorrow, home aide will be available tomorrow
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     # Fever - possible PNA  CXR: developing L basilar infiltrates  - start cefepime 2G q12  - follow up blood cx and UA   - monitor fever curve     # mechanical call resulted in L hip femoral neck fx    # Left hip femoral neck fracture s/p opening pinning  follow Ortho   pain control   wbat LLE   per PT: Home with PT  DVT/GI proph    # HTN  cont lopressor    # HLD  cont statin.     # hyperkalemia - resolved   - K 3.9    # Dementia/anxiety  cont olanzapine, citalopram, namenda, remeron, exelon    # CKD III - IV  stable    # DISPO: possible DC tomorrow once fever resolved.
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     # Fever secondary to PNA - suspect complicated PNA  - fever resolved; afebrile today (9/6), no leukocytosis  CXR: developing L basilar infiltrates  - cefepime was changed to ceftin renal dosed for d/c planning   - blood cx: negative and UA negative  - monitor fever curve     # Left hip femoral neck fracture s/p opening pinning  follow Ortho   pain control   wbat LLE   per PT: Home with PT  DVT/GI proph    # HTN - controlled   cont lopressor    # HLD  cont statin.     # Dementia/anxiety  cont olanzapine, citalopram, namenda, remeron, exelon    # CKD III - IV - baseline 2.0  Stable, at baseline     # DISPO: DC home when home aides reinstated
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     # Left hip femoral neck fracture s/p opening pinning  follow Ortho   pain control   DVT/GI proph    # HTN  cont lopressor    # HLD  cont statin.     # hyperkalemia   - monitor     # Dementia/anxiety  cont olanzapine, citalopram, namenda, remeron, exelon    # CKD III  stable
POD #2 left hip pinning    -continue current management  -monitor labs  -pt/ot wbat lle  d/c planning  ortho stable for d/c when cleared by medicine
Surgically stable   TBI, Dementia, Htn, Hld, ckd    Plan:  PT            Asa 81 mg po bid x 6weeks for dvt prophylaxis            as per medicine            Discharge to home today
POD # 2 s/p open pinning of Left hip femoral neck fracture   reassuring patient status ]  hemodynamically stable, afebrile   continue nursing care as per protocol  continue DVT prophylaxis and pain management as prescribed   plan to notify Dr Figueroa of patient status

## 2019-09-06 NOTE — PROGRESS NOTE ADULT - SUBJECTIVE AND OBJECTIVE BOX
POD #2 Left hip pinning    Pt reclined in chair. +dementia alert nonresponsive  verbally  Vital Signs Last 24 Hrs  T(C): 37.2 (04 Sep 2019 12:30), Max: 38.2 (04 Sep 2019 06:11)  T(F): 99 (04 Sep 2019 12:30), Max: 100.8 (04 Sep 2019 06:11)  HR: 72 (04 Sep 2019 12:30) (72 - 79)  BP: 151/72 (04 Sep 2019 12:30) (151/72 - 162/71)  BP(mean): --  RR: 16 (04 Sep 2019 12:30) (16 - 16)  SpO2: 95% (04 Sep 2019 12:30) (94% - 95%)  Lhip incision: aquacell in place. minimal drainage noted thight soft  LLE: NVI moves foot to stimuli. +DP                          10.6   10.73 )-----------( 182      ( 04 Sep 2019 06:35 )             32.3   09-04    138  |  107  |  23  ----------------------------<  97  3.9   |  25  |  1.93<H>    Ca    9.3      04 Sep 2019 06:35    I&O's Detail    03 Sep 2019 07:01  -  04 Sep 2019 07:00  --------------------------------------------------------  IN:    lactated ringers.: 900 mL  Total IN: 900 mL    OUT:  Total OUT: 0 mL    Total NET: 900 mL      04 Sep 2019 07:01  -  04 Sep 2019 15:19  --------------------------------------------------------  IN:    lactated ringers.: 150 mL  Total IN: 150 mL    OUT:  Total OUT: 0 mL    Total NET: 150 mL
Patient is a 89y old  Male who presents with a chief complaint of s/p fall with L hip fx. (03 Sep 2019 08:09)      Patient seen and examined at bedside. Pt is on one to one due to agitation and trying to climb out of bed overnight. Pt was sleeping when I examined him and staff at bedside suggests to not wake the patient.       ALLERGIES:  No Known Allergies    MEDICATIONS  (STANDING):  atorvastatin 10 milliGRAM(s) Oral at bedtime  enoxaparin Injectable 30 milliGRAM(s) SubCutaneous daily  escitalopram 5 milliGRAM(s) Oral daily  lactated ringers. 1000 milliLiter(s) (75 mL/Hr) IV Continuous <Continuous>  memantine 10 milliGRAM(s) Oral two times a day  metoprolol tartrate 25 milliGRAM(s) Oral two times a day  mirtazapine 45 milliGRAM(s) Oral at bedtime  OLANZapine 5 milliGRAM(s) Oral at bedtime  pantoprazole    Tablet 40 milliGRAM(s) Oral before breakfast  senna 2 Tablet(s) Oral at bedtime    MEDICATIONS  (PRN):  acetaminophen   Tablet .. 650 milliGRAM(s) Oral every 6 hours PRN Temp greater or equal to 38C (100.4F), Mild Pain (1 - 3)  morphine  - Injectable 2 milliGRAM(s) IV Push every 6 hours PRN Severe Pain (7 - 10)  ondansetron Injectable 4 milliGRAM(s) IV Push every 6 hours PRN Nausea and/or Vomiting  oxyCODONE    IR 5 milliGRAM(s) Oral every 3 hours PRN Moderate Pain (4 - 6)  traMADol 50 milliGRAM(s) Oral every 4 hours PRN Mild Pain (1 - 3)    Vital Signs Last 24 Hrs  T(F): 98.2 (03 Sep 2019 05:29), Max: 99.8 (02 Sep 2019 16:13)  HR: 72 (03 Sep 2019 05:29) (66 - 74)  BP: 143/73 (03 Sep 2019 05:29) (124/89 - 155/69)  RR: 16 (03 Sep 2019 05:29) (15 - 16)  SpO2: 96% (03 Sep 2019 05:29) (96% - 100%)  I&O's Summary    02 Sep 2019 07:01  -  03 Sep 2019 07:00  --------------------------------------------------------  IN: 740 mL / OUT: 250 mL / NET: 490 mL    03 Sep 2019 07:01  -  03 Sep 2019 12:51  --------------------------------------------------------  IN: 200 mL / OUT: 0 mL / NET: 200 mL          GENERAL: NAD  HEAD:  Atraumatic, Normocephalic  EYES:  sclera clear  NECK: Supple  CHEST/LUNG: Clear to auscultation bilaterally; No wheeze  HEART: Regular rate and rhythm; No murmurs, rubs, or gallops  ABDOMEN: Soft, Nontender, Nondistended; Bowel sounds present  EXTREMITIES:  + ORIF on left hip and dresssing c/d/i  SKIN: No rashes or lesions    LABS:                        11.5   11.13 )-----------( 182      ( 03 Sep 2019 07:08 )             35.4         138  |  106  |  24  ----------------------------<  100  5.1   |  25  |  1.92    Ca    10.0      03 Sep 2019 07:08    TPro  6.5  /  Alb  3.4  /  TBili  0.4  /  DBili  x   /  AST  23  /  ALT  18  /  AlkPhos  96          eGFR if African American: 34 mL/min/1.73M2 (19 @ 07:08)  eGFR if Non African American: 30 mL/min/1.73M2 (19 @ 07:08)    PT/INR - ( 02 Sep 2019 00:50 )   PT: 13.0 sec;   INR: 1.16 ratio         PTT - ( 02 Sep 2019 00:50 )  PTT:28.1 sec      Urinalysis Basic - ( 02 Sep 2019 04:33 )    Color: Yellow / Appearance: Clear / S.010 / pH: x  Gluc: x / Ketone: Negative  / Bili: Negative / Urobili: Negative   Blood: x / Protein: 30 mg/dL / Nitrite: Negative   Leuk Esterase: Trace / RBC: Negative /HPF / WBC Negative /HPF   Sq Epi: x / Non Sq Epi: Neg.-Few / Bacteria: Negative /HPF      RADIOLOGY & ADDITIONAL TESTS:    Care Discussed with Consultants/Other Providers:
Patient is a 89y old  Male who presents with a chief complaint of s/p fall with L hip fx. (04 Sep 2019 15:16)      Patient seen and examined at bedside. No overnight events reported. Not agitated today. Drowsy in the morning and had Fever 100.9 this AM.     ALLERGIES:  No Known Allergies    MEDICATIONS  (STANDING):  atorvastatin 10 milliGRAM(s) Oral at bedtime  cefepime   IVPB 2000 milliGRAM(s) IV Intermittent every 12 hours  enoxaparin Injectable 30 milliGRAM(s) SubCutaneous daily  escitalopram 5 milliGRAM(s) Oral daily  lactated ringers. 1000 milliLiter(s) (75 mL/Hr) IV Continuous <Continuous>  memantine 10 milliGRAM(s) Oral two times a day  metoprolol tartrate 25 milliGRAM(s) Oral two times a day  mirtazapine 45 milliGRAM(s) Oral at bedtime  OLANZapine 5 milliGRAM(s) Oral at bedtime  pantoprazole    Tablet 40 milliGRAM(s) Oral before breakfast  rivastigmine patch  9.5 mG/24 Hr(s) 1 Patch Transdermal every 24 hours  senna 2 Tablet(s) Oral at bedtime    MEDICATIONS  (PRN):  acetaminophen   Tablet .. 650 milliGRAM(s) Oral every 6 hours PRN Temp greater or equal to 38C (100.4F), Mild Pain (1 - 3)  morphine  - Injectable 2 milliGRAM(s) IV Push every 6 hours PRN Severe Pain (7 - 10)  ondansetron Injectable 4 milliGRAM(s) IV Push every 6 hours PRN Nausea and/or Vomiting  oxyCODONE    IR 5 milliGRAM(s) Oral every 3 hours PRN Moderate Pain (4 - 6)  traMADol 50 milliGRAM(s) Oral every 4 hours PRN Mild Pain (1 - 3)    Vital Signs Last 24 Hrs  T(F): 98.5 (04 Sep 2019 15:28), Max: 100.8 (04 Sep 2019 06:11)  HR: 79 (04 Sep 2019 15:28) (72 - 79)  BP: 156/66 (04 Sep 2019 15:28) (151/72 - 162/71)  RR: 16 (04 Sep 2019 15:28) (16 - 16)  SpO2: 94% (04 Sep 2019 15:28) (94% - 95%)  I&O's Summary    03 Sep 2019 07:  -  04 Sep 2019 07:00  --------------------------------------------------------  IN: 900 mL / OUT: 0 mL / NET: 900 mL    04 Sep 2019 07:  -  04 Sep 2019 15:35  --------------------------------------------------------  IN: 150 mL / OUT: 0 mL / NET: 150 mL        GENERAL: drowsy. AAO X1  HEAD:  Atraumatic, Normocephalic  EYES: EOMI, PERRLA, conjunctiva and sclera clear  NECK: Supple  CHEST/LUNG: Clear to auscultation bilaterally; No wheeze  HEART: Regular rate and rhythm; No murmurs, rubs, or gallops  ABDOMEN: Soft, Nontender, Nondistended; Bowel sounds present  EXTREMITIES: no edema. surgical dressing c/d/i  NEUROLOGY: non-focal  SKIN: No rashes or lesions    LABS:                        10.6   10.73 )-----------( 182      ( 04 Sep 2019 06:35 )             32.3         138  |  107  |  23  ----------------------------<  97  3.9   |  25  |  1.93    Ca    9.3      04 Sep 2019 06:35    TPro  6.5  /  Alb  3.4  /  TBili  0.4  /  DBili  x   /  AST  23  /  ALT  18  /  AlkPhos  96          eGFR if Non African American: 30 mL/min/1.73M2 (19 @ 06:35)  eGFR if African American: 34 mL/min/1.73M2 (19 @ 06:35)    PT/INR - ( 02 Sep 2019 00:50 )   PT: 13.0 sec;   INR: 1.16 ratio         PTT - ( 02 Sep 2019 00:50 )  PTT:28.1 sec      Urinalysis Basic - ( 02 Sep 2019 04:33 )    Color: Yellow / Appearance: Clear / S.010 / pH: x  Gluc: x / Ketone: Negative  / Bili: Negative / Urobili: Negative   Blood: x / Protein: 30 mg/dL / Nitrite: Negative   Leuk Esterase: Trace / RBC: Negative /HPF / WBC Negative /HPF   Sq Epi: x / Non Sq Epi: Neg.-Few / Bacteria: Negative /HPF        RADIOLOGY & ADDITIONAL TESTS:    Care Discussed with Consultants/Other Providers:
Patient is a 89y old  Male who presents with a chief complaint of s/p fall with L hip fx. (05 Sep 2019 17:21)      Patient seen and examined at bedside, no events overnight, pt. confused.   ALLERGIES:  No Known Allergies    MEDICATIONS:  acetaminophen   Tablet .. 650 milliGRAM(s) Oral every 6 hours PRN  atorvastatin 10 milliGRAM(s) Oral at bedtime  escitalopram 5 milliGRAM(s) Oral daily  memantine 10 milliGRAM(s) Oral two times a day  metoprolol tartrate 25 milliGRAM(s) Oral two times a day  mirtazapine 45 milliGRAM(s) Oral at bedtime  morphine  - Injectable 2 milliGRAM(s) IV Push every 6 hours PRN  OLANZapine 5 milliGRAM(s) Oral at bedtime  pantoprazole    Tablet 40 milliGRAM(s) Oral before breakfast  rivastigmine patch  9.5 mG/24 Hr(s) 1 Patch Transdermal every 24 hours  senna 2 Tablet(s) Oral at bedtime    Vital Signs Last 24 Hrs  T(C): 37.1 (06 Sep 2019 05:08), Max: 37.2 (05 Sep 2019 14:06)  T(F): 98.7 (06 Sep 2019 05:08), Max: 99 (05 Sep 2019 21:39)  HR: 81 (06 Sep 2019 05:08) (75 - 81)  BP: 154/74 (06 Sep 2019 05:08) (130/65 - 158/77)  BP(mean): --  RR: 17 (06 Sep 2019 05:08) (16 - 17)  SpO2: 95% (06 Sep 2019 05:08) (95% - 95%)  I&O's Summary    05 Sep 2019 07:01  -  06 Sep 2019 07:00  --------------------------------------------------------  IN: 1270 mL / OUT: 0 mL / NET: 1270 mL          PAST MEDICAL & SURGICAL HISTORY:  Anxiety  Depression  HTN (hypertension)  Hypercholesteremia  Skull fracture  Dementia  History of cholecystectomy  H/O abdominal surgery: with partial gastrectomy sec to ruptured stomach      Home Medications:  atorvastatin 10 mg oral tablet: 1 tab(s) orally once a day (16 May 2019 15:30)  citalopram 10 mg oral tablet: 1 tab(s) orally once a day (16 May 2019 15:30)  memantine 10 mg oral tablet: 1 tab(s) orally 2 times a day (02 Sep 2019 02:13)  memantine 10 mg oral tablet: 1 tab(s) orally 2 times a day (16 May 2019 15:30)  Metoprolol Tartrate 50 mg oral tablet: 1 tab(s) orally once a day (02 Sep 2019 02:13)  mirtazapine 45 mg oral tablet: 1 tab(s) orally once a day (at bedtime) (16 May 2019 15:30)  OLANZapine 5 mg oral tablet: 1 tab(s) orally once a day (16 May 2019 15:30)  rivastigmine 9.5 mg/24 hr transdermal film, extended release: 1 patch transdermal every 24 hours (24 May 2019 11:41)      PHYSICAL EXAM:  General: NAD, confused   ENT: MMM  Neck: Supple, No JVD  Lungs: Clear to percussion bilaterally  Cardio: RRR, S1/S2, No murmurs  Abdomen: Soft, Nontender, Nondistended; Bowel sounds present  Extremities: No clubbing, cyanosis, +2 B/L LE edema     LABS:                        10.0   8.39  )-----------( 193      ( 06 Sep 2019 06:36 )             30.6     09-    136  |  106  |  29  ----------------------------<  107  5.2   |  26  |  1.98    Ca    9.5      06 Sep 2019 06:36    Urinalysis Basic - ( 04 Sep 2019 21:00 )    Color: Yellow / Appearance: Clear / S.010 / pH: x  Gluc: x / Ketone: Negative  / Bili: Negative / Urobili: Negative   Blood: x / Protein: 30 mg/dL / Nitrite: Negative   Leuk Esterase: Trace / RBC: 0-4 /HPF / WBC 0-2 /HPF   Sq Epi: x / Non Sq Epi: Neg.-Few / Bacteria: Few /HPF      Culture - Blood (collected 04 Sep 2019 16:55)  Source: .Blood Blood  Preliminary Report (06 Sep 2019 02:01):    No growth to date.    Culture - Blood (collected 04 Sep 2019 11:25)  Source: .Blood Blood  Preliminary Report (05 Sep 2019 17:01):    No growth to date.      RADIOLOGY & ADDITIONAL TESTS: < from: Xray Chest 1 View-PORTABLE IMMEDIATE (19 @ 11:27) >    INTERPRETATION:  AP erect chest on 2019 at 10:42 AM. Patient   has fever.    Heart is magnified by technique. The aorta is somewhat ectatic.    Present film shows a small infiltrate at the left base new since   .    IMPRESSION: Developing small left base infiltrate.    < end of copied text >      Care Discussed with Consultants/Other Providers: hospitalist
Surgery PA Note:  POD #4    s/p ORIF left hip     Patient was seen and examined by me .  Patient has dementia  and unable to answer any questions when asked.  '  Patient is with daughter and son- law .  He will be discharged to home with his family today.    Vital Signs Last 24 Hrs  T(C): 37.1 (06 Sep 2019 05:08), Max: 37.2 (05 Sep 2019 21:39)  T(F): 98.7 (06 Sep 2019 05:08), Max: 99 (05 Sep 2019 21:39)  HR: 81 (06 Sep 2019 05:08) (75 - 81)  BP: 154/74 (06 Sep 2019 05:08) (154/74 - 156/71)  BP(mean): --  RR: 17 (06 Sep 2019 05:08) (16 - 17)  SpO2: 95% (06 Sep 2019 05:08) (95% - 95%)    PE:  Alert gentleman but not oriented to time or place.  Dementia for 20 years ? TBI  Lungs:  CTA   Cor:  RR at 81 bmp  Abd:  soft, non tender , voiding, tolerating a diet   Ext:   Left Hip Dressing Aquacel clean dry and intact.          Thigh soft, + neurovascular intact           calves soft bilateral                        10.0   8.39  )-----------( 193      ( 06 Sep 2019 06:36 )             30.6   09-06    136  |  106  |  29<H>  ----------------------------<  107<H>  5.2   |  26  |  1.98<H>    Ca    9.5      06 Sep 2019 06:36
" He was combative last night  ( one to one nursing)"    S Patient is found sleeping and has a nurse present to watch him on a one to one due to his dementia.  He is said to have been combative overnight , but no other issues .  He is sleeping and the nurse preferred that I not wake him, so I examined him carefully.  No evidence of discomfort or pain as I examined him.      O Patient is sleeping  , Dementia   ICU Vital Signs Last 24 Hrs  T(C): 36.8 (03 Sep 2019 05:29), Max: 37.7 (02 Sep 2019 16:13)  T(F): 98.2 (03 Sep 2019 05:29), Max: 99.8 (02 Sep 2019 16:13)  HR: 72 (03 Sep 2019 05:29) (62 - 74)  BP: 143/73 (03 Sep 2019 05:29) (111/62 - 155/69)  BP(mean): 76 (02 Sep 2019 18:44) (76 - 76)  ABP: --  ABP(mean): --  RR: 16 (03 Sep 2019 05:29) (12 - 16)  SpO2: 96% (03 Sep 2019 05:29) (96% - 100%)      Left leg/hip:   + aquacell in place and is clean and dry , no erythema, echhymosis , edema, or tightness of the upper extremity noted .     bilateral lower extremity , venodynes in place and working, calves nontender bilaterally, no edema, no chord noted  , pedal pulses intact .       CBC Full  -  ( 03 Sep 2019 07:08 )  WBC Count : 11.13 K/uL  RBC Count : 3.58 M/uL  Hemoglobin : 11.5 g/dL  Hematocrit : 35.4 %  Platelet Count - Automated : 182 K/uL  Mean Cell Volume : 98.9 fl  Mean Cell Hemoglobin : 32.1 pg  Mean Cell Hemoglobin Concentration : 32.5 gm/dL  Auto Neutrophil # : x  Auto Lymphocyte # : x  Auto Monocyte # : x  Auto Eosinophil # : x  Auto Basophil # : x  Auto Neutrophil % : x  Auto Lymphocyte % : x  Auto Monocyte % : x  Auto Eosinophil % : x  Auto Basophil % : x

## 2019-09-06 NOTE — DISCHARGE NOTE PROVIDER - CARE PROVIDERS DIRECT ADDRESSES
[email or web address]

## 2019-09-06 NOTE — PROGRESS NOTE ADULT - ATTENDING COMMENTS
I have personally seen and examined patient on the above date.  I discussed the case with ANA MARIA Rodriguez and I agree with findings and plan as detailed per note above, which I have amended where appropriate.    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 Pt. is stable for discharge home, full weight bearing as tolerated,  visiting nurse service and home aides.

## 2019-09-06 NOTE — DISCHARGE NOTE PROVIDER - NSDCACTIVITY_GEN_ALL_CORE
Do not make important decisions/No heavy lifting/straining/Walking - Outdoors allowed/Do not drive or operate machinery/Walking - Indoors allowed

## 2019-09-06 NOTE — DISCHARGE NOTE PROVIDER - CARE PROVIDER_API CALL
Flex Cutler)  Medicine  [address], [address]  [place] [address]  Phone: [phone]  Fax: [phone]  Follow Up Time:     Rinku Figueroa)  Orthopaedic Sports Medicine; Orthopaedic Surgery  [address]  [place] [address]  Phone: [phone]  Fax: [phone]  Follow Up Time:

## 2019-09-07 RX ORDER — CEFOXITIN 1 G/1
1 INJECTION, POWDER, FOR SOLUTION INTRAVENOUS
Qty: 0 | Refills: 0 | DISCHARGE
Start: 2019-09-07 | End: 2019-09-14

## 2019-09-07 RX ORDER — CEFUROXIME AXETIL 250 MG
1 TABLET ORAL
Qty: 7 | Refills: 0
Start: 2019-09-07 | End: 2019-09-13

## 2019-09-09 LAB
CULTURE RESULTS: SIGNIFICANT CHANGE UP
SPECIMEN SOURCE: SIGNIFICANT CHANGE UP

## 2019-09-10 ENCOUNTER — APPOINTMENT (OUTPATIENT)
Dept: FAMILY MEDICINE | Facility: HOME HEALTH | Age: 84
End: 2019-09-10
Payer: MEDICARE

## 2019-09-10 DIAGNOSIS — S72.002P FRACTURE OF UNSPECIFIED PART OF NECK OF LEFT FEMUR, SUBSEQUENT ENCOUNTER FOR CLOSED FRACTURE WITH MALUNION: ICD-10-CM

## 2019-09-10 DIAGNOSIS — F41.9 ANXIETY DISORDER, UNSPECIFIED: ICD-10-CM

## 2019-09-10 LAB
CULTURE RESULTS: SIGNIFICANT CHANGE UP
SPECIMEN SOURCE: SIGNIFICANT CHANGE UP

## 2019-09-10 PROCEDURE — 99496 TRANSJ CARE MGMT HIGH F2F 7D: CPT

## 2019-09-11 ENCOUNTER — MED ADMIN CHARGE (OUTPATIENT)
Age: 84
End: 2019-09-11

## 2019-09-11 PROBLEM — S72.002P: Status: ACTIVE | Noted: 2019-09-11

## 2019-09-12 ENCOUNTER — OTHER (OUTPATIENT)
Age: 84
End: 2019-09-12

## 2019-09-12 ENCOUNTER — MEDICATION RENEWAL (OUTPATIENT)
Age: 84
End: 2019-09-12

## 2019-09-12 DIAGNOSIS — B49 UNSPECIFIED MYCOSIS: ICD-10-CM

## 2019-09-13 ENCOUNTER — OTHER (OUTPATIENT)
Age: 84
End: 2019-09-13

## 2019-09-20 ENCOUNTER — RX RENEWAL (OUTPATIENT)
Age: 84
End: 2019-09-20

## 2019-10-01 ENCOUNTER — APPOINTMENT (OUTPATIENT)
Dept: FAMILY MEDICINE | Facility: HOME HEALTH | Age: 84
End: 2019-10-01
Payer: MEDICARE

## 2019-10-01 DIAGNOSIS — Z91.81 HISTORY OF FALLING: ICD-10-CM

## 2019-10-01 PROCEDURE — 99350 HOME/RES VST EST HIGH MDM 60: CPT

## 2019-10-02 PROBLEM — Z91.81 STATUS POST FALL: Status: ACTIVE | Noted: 2019-09-11

## 2019-10-12 ENCOUNTER — RX RENEWAL (OUTPATIENT)
Age: 84
End: 2019-10-12

## 2019-10-18 ENCOUNTER — RX RENEWAL (OUTPATIENT)
Age: 84
End: 2019-10-18

## 2019-11-03 ENCOUNTER — RX RENEWAL (OUTPATIENT)
Age: 84
End: 2019-11-03

## 2019-12-03 ENCOUNTER — RX RENEWAL (OUTPATIENT)
Age: 84
End: 2019-12-03

## 2019-12-17 ENCOUNTER — APPOINTMENT (OUTPATIENT)
Dept: FAMILY MEDICINE | Facility: HOME HEALTH | Age: 84
End: 2019-12-17
Payer: MEDICARE

## 2019-12-17 PROCEDURE — 99350 HOME/RES VST EST HIGH MDM 60: CPT

## 2019-12-19 ENCOUNTER — OTHER (OUTPATIENT)
Age: 84
End: 2019-12-19

## 2020-01-03 ENCOUNTER — LABORATORY RESULT (OUTPATIENT)
Age: 85
End: 2020-01-03

## 2020-01-24 ENCOUNTER — RX RENEWAL (OUTPATIENT)
Age: 85
End: 2020-01-24

## 2020-02-12 ENCOUNTER — RX RENEWAL (OUTPATIENT)
Age: 85
End: 2020-02-12

## 2020-03-11 ENCOUNTER — RX RENEWAL (OUTPATIENT)
Age: 85
End: 2020-03-11

## 2020-03-12 NOTE — H&P ADULT - EYES
[FreeTextEntry1] : Pre-op From cardiovascular standpoint, Mr. DELGADO is of acceptable risk for the planned procedure and may move forward without further cardiovascular testing.\par HLD FABIAN and I discussed his lipid panel and individualized target LDL goal. At this point, will do diet and exercise with anticipation of re-evaluating labs in 3-6 months\par HTN - FABIAN  and I had an extensive discussion regarding his blood pressure management. Patient will continue taking current medications in addition to maintaining a low Na diet, with periodic b/p checks at home.\par SOB Inclined towards a conservative follow up in this patient. We had a careful discussion regarding diet and exercise. Will be happy to re-evaluate.\par Emotional support provided.\par  EOMI; PERRL; no drainage or redness

## 2020-03-23 ENCOUNTER — RX RENEWAL (OUTPATIENT)
Age: 85
End: 2020-03-23

## 2020-04-24 ENCOUNTER — RX RENEWAL (OUTPATIENT)
Age: 85
End: 2020-04-24

## 2020-05-13 ENCOUNTER — RX RENEWAL (OUTPATIENT)
Age: 85
End: 2020-05-13

## 2020-06-16 ENCOUNTER — APPOINTMENT (OUTPATIENT)
Dept: FAMILY MEDICINE | Facility: HOME HEALTH | Age: 85
End: 2020-06-16
Payer: MEDICARE

## 2020-06-16 PROCEDURE — 99349 HOME/RES VST EST MOD MDM 40: CPT

## 2020-06-19 LAB
ALBUMIN SERPL ELPH-MCNC: 4.2 G/DL
ALP BLD-CCNC: 108 U/L
ALT SERPL-CCNC: 13 U/L
ANION GAP SERPL CALC-SCNC: 14 MMOL/L
AST SERPL-CCNC: 18 U/L
BASOPHILS # BLD AUTO: 0.03 K/UL
BASOPHILS NFR BLD AUTO: 0.4 %
BILIRUB SERPL-MCNC: 0.4 MG/DL
BUN SERPL-MCNC: 29 MG/DL
CALCIUM SERPL-MCNC: 10.4 MG/DL
CHLORIDE SERPL-SCNC: 107 MMOL/L
CHOLEST SERPL-MCNC: 97 MG/DL
CHOLEST/HDLC SERPL: 2.3 RATIO
CO2 SERPL-SCNC: 20 MMOL/L
CREAT SERPL-MCNC: 2.32 MG/DL
EOSINOPHIL # BLD AUTO: 0.28 K/UL
EOSINOPHIL NFR BLD AUTO: 3.4 %
ESTIMATED AVERAGE GLUCOSE: 108 MG/DL
GLUCOSE SERPL-MCNC: 133 MG/DL
HBA1C MFR BLD HPLC: 5.4 %
HCT VFR BLD CALC: 40.7 %
HDLC SERPL-MCNC: 42 MG/DL
HGB BLD-MCNC: 12.6 G/DL
IMM GRANULOCYTES NFR BLD AUTO: 0.5 %
LDLC SERPL CALC-MCNC: 34 MG/DL
LYMPHOCYTES # BLD AUTO: 1.83 K/UL
LYMPHOCYTES NFR BLD AUTO: 22 %
MAN DIFF?: NORMAL
MCHC RBC-ENTMCNC: 31 GM/DL
MCHC RBC-ENTMCNC: 31.2 PG
MCV RBC AUTO: 100.7 FL
MONOCYTES # BLD AUTO: 0.67 K/UL
MONOCYTES NFR BLD AUTO: 8.1 %
NEUTROPHILS # BLD AUTO: 5.45 K/UL
NEUTROPHILS NFR BLD AUTO: 65.6 %
PLATELET # BLD AUTO: 194 K/UL
POTASSIUM SERPL-SCNC: 4.6 MMOL/L
PROT SERPL-MCNC: 6.5 G/DL
RBC # BLD: 4.04 M/UL
RBC # FLD: 12.7 %
SODIUM SERPL-SCNC: 140 MMOL/L
TRIGL SERPL-MCNC: 106 MG/DL
TSH SERPL-ACNC: 2.79 UIU/ML
WBC # FLD AUTO: 8.3 K/UL

## 2020-06-20 ENCOUNTER — RX RENEWAL (OUTPATIENT)
Age: 85
End: 2020-06-20

## 2020-06-29 ENCOUNTER — EMERGENCY (EMERGENCY)
Facility: HOSPITAL | Age: 85
LOS: 1 days | Discharge: HOME | End: 2020-06-29
Attending: STUDENT IN AN ORGANIZED HEALTH CARE EDUCATION/TRAINING PROGRAM | Admitting: STUDENT IN AN ORGANIZED HEALTH CARE EDUCATION/TRAINING PROGRAM
Payer: COMMERCIAL

## 2020-06-29 VITALS
DIASTOLIC BLOOD PRESSURE: 82 MMHG | TEMPERATURE: 103 F | OXYGEN SATURATION: 95 % | WEIGHT: 175.93 LBS | SYSTOLIC BLOOD PRESSURE: 170 MMHG | RESPIRATION RATE: 19 BRPM | HEART RATE: 93 BPM | HEIGHT: 70 IN

## 2020-06-29 VITALS
TEMPERATURE: 100 F | SYSTOLIC BLOOD PRESSURE: 154 MMHG | HEART RATE: 82 BPM | RESPIRATION RATE: 18 BRPM | DIASTOLIC BLOOD PRESSURE: 74 MMHG | OXYGEN SATURATION: 94 %

## 2020-06-29 DIAGNOSIS — Z90.49 ACQUIRED ABSENCE OF OTHER SPECIFIED PARTS OF DIGESTIVE TRACT: Chronic | ICD-10-CM

## 2020-06-29 DIAGNOSIS — S01.01XA LACERATION WITHOUT FOREIGN BODY OF SCALP, INITIAL ENCOUNTER: ICD-10-CM

## 2020-06-29 DIAGNOSIS — Z98.890 OTHER SPECIFIED POSTPROCEDURAL STATES: Chronic | ICD-10-CM

## 2020-06-29 LAB
ALBUMIN SERPL ELPH-MCNC: 3.8 G/DL — SIGNIFICANT CHANGE UP (ref 3.3–5)
ALP SERPL-CCNC: 109 U/L — SIGNIFICANT CHANGE UP (ref 40–120)
ALT FLD-CCNC: 25 U/L — SIGNIFICANT CHANGE UP (ref 10–45)
ANION GAP SERPL CALC-SCNC: 11 MMOL/L — SIGNIFICANT CHANGE UP (ref 5–17)
APPEARANCE UR: ABNORMAL
APTT BLD: 27.2 SEC — LOW (ref 27.5–36.3)
AST SERPL-CCNC: 22 U/L — SIGNIFICANT CHANGE UP (ref 10–40)
BACTERIA # UR AUTO: ABNORMAL /HPF
BASOPHILS # BLD AUTO: 0.02 K/UL — SIGNIFICANT CHANGE UP (ref 0–0.2)
BASOPHILS NFR BLD AUTO: 0.2 % — SIGNIFICANT CHANGE UP (ref 0–2)
BILIRUB SERPL-MCNC: 0.5 MG/DL — SIGNIFICANT CHANGE UP (ref 0.2–1.2)
BILIRUB UR-MCNC: NEGATIVE — SIGNIFICANT CHANGE UP
BUN SERPL-MCNC: 30 MG/DL — HIGH (ref 7–23)
CALCIUM SERPL-MCNC: 10.1 MG/DL — SIGNIFICANT CHANGE UP (ref 8.4–10.5)
CHLORIDE SERPL-SCNC: 105 MMOL/L — SIGNIFICANT CHANGE UP (ref 96–108)
CO2 SERPL-SCNC: 24 MMOL/L — SIGNIFICANT CHANGE UP (ref 22–31)
COLOR SPEC: YELLOW — SIGNIFICANT CHANGE UP
CREAT SERPL-MCNC: 2.57 MG/DL — HIGH (ref 0.5–1.3)
DIFF PNL FLD: ABNORMAL
EOSINOPHIL # BLD AUTO: 0.08 K/UL — SIGNIFICANT CHANGE UP (ref 0–0.5)
EOSINOPHIL NFR BLD AUTO: 0.6 % — SIGNIFICANT CHANGE UP (ref 0–6)
EPI CELLS # UR: SIGNIFICANT CHANGE UP
GLUCOSE SERPL-MCNC: 120 MG/DL — HIGH (ref 70–99)
GLUCOSE UR QL: NEGATIVE — SIGNIFICANT CHANGE UP
HCT VFR BLD CALC: 38.4 % — LOW (ref 39–50)
HGB BLD-MCNC: 12.7 G/DL — LOW (ref 13–17)
IMM GRANULOCYTES NFR BLD AUTO: 0.7 % — SIGNIFICANT CHANGE UP (ref 0–1.5)
INR BLD: 1.12 RATIO — SIGNIFICANT CHANGE UP (ref 0.88–1.16)
KETONES UR-MCNC: NEGATIVE — SIGNIFICANT CHANGE UP
LACTATE SERPL-SCNC: 2 MMOL/L — SIGNIFICANT CHANGE UP (ref 0.7–2)
LEUKOCYTE ESTERASE UR-ACNC: ABNORMAL
LYMPHOCYTES # BLD AUTO: 0.93 K/UL — LOW (ref 1–3.3)
LYMPHOCYTES # BLD AUTO: 7 % — LOW (ref 13–44)
MCHC RBC-ENTMCNC: 31.8 PG — SIGNIFICANT CHANGE UP (ref 27–34)
MCHC RBC-ENTMCNC: 33.1 GM/DL — SIGNIFICANT CHANGE UP (ref 32–36)
MCV RBC AUTO: 96.2 FL — SIGNIFICANT CHANGE UP (ref 80–100)
MONOCYTES # BLD AUTO: 0.83 K/UL — SIGNIFICANT CHANGE UP (ref 0–0.9)
MONOCYTES NFR BLD AUTO: 6.2 % — SIGNIFICANT CHANGE UP (ref 2–14)
NEUTROPHILS # BLD AUTO: 11.37 K/UL — HIGH (ref 1.8–7.4)
NEUTROPHILS NFR BLD AUTO: 85.3 % — HIGH (ref 43–77)
NITRITE UR-MCNC: POSITIVE
NRBC # BLD: 0 /100 WBCS — SIGNIFICANT CHANGE UP (ref 0–0)
NT-PROBNP SERPL-SCNC: 913 PG/ML — HIGH (ref 0–300)
PH UR: 6.5 — SIGNIFICANT CHANGE UP (ref 5–8)
PLATELET # BLD AUTO: 209 K/UL — SIGNIFICANT CHANGE UP (ref 150–400)
POTASSIUM SERPL-MCNC: 4.3 MMOL/L — SIGNIFICANT CHANGE UP (ref 3.5–5.3)
POTASSIUM SERPL-SCNC: 4.3 MMOL/L — SIGNIFICANT CHANGE UP (ref 3.5–5.3)
PROT SERPL-MCNC: 7.1 G/DL — SIGNIFICANT CHANGE UP (ref 6–8.3)
PROT UR-MCNC: 100
PROTHROM AB SERPL-ACNC: 12.7 SEC — SIGNIFICANT CHANGE UP (ref 10–12.9)
RBC # BLD: 3.99 M/UL — LOW (ref 4.2–5.8)
RBC # FLD: 12.5 % — SIGNIFICANT CHANGE UP (ref 10.3–14.5)
RBC CASTS # UR COMP ASSIST: ABNORMAL /HPF (ref 0–4)
SARS-COV-2 RNA SPEC QL NAA+PROBE: SIGNIFICANT CHANGE UP
SODIUM SERPL-SCNC: 140 MMOL/L — SIGNIFICANT CHANGE UP (ref 135–145)
SP GR SPEC: 1.01 — SIGNIFICANT CHANGE UP (ref 1.01–1.02)
TROPONIN I SERPL-MCNC: <.017 NG/ML — LOW (ref 0.02–0.06)
UROBILINOGEN FLD QL: NEGATIVE — SIGNIFICANT CHANGE UP
WBC # BLD: 13.32 K/UL — HIGH (ref 3.8–10.5)
WBC # FLD AUTO: 13.32 K/UL — HIGH (ref 3.8–10.5)
WBC UR QL: SIGNIFICANT CHANGE UP /HPF (ref 0–5)

## 2020-06-29 PROCEDURE — 96365 THER/PROPH/DIAG IV INF INIT: CPT

## 2020-06-29 PROCEDURE — U0003: CPT

## 2020-06-29 PROCEDURE — 73522 X-RAY EXAM HIPS BI 3-4 VIEWS: CPT | Mod: 26

## 2020-06-29 PROCEDURE — 71045 X-RAY EXAM CHEST 1 VIEW: CPT | Mod: 26

## 2020-06-29 PROCEDURE — 70450 CT HEAD/BRAIN W/O DYE: CPT | Mod: 26

## 2020-06-29 PROCEDURE — 72125 CT NECK SPINE W/O DYE: CPT

## 2020-06-29 PROCEDURE — 84484 ASSAY OF TROPONIN QUANT: CPT

## 2020-06-29 PROCEDURE — 93010 ELECTROCARDIOGRAM REPORT: CPT

## 2020-06-29 PROCEDURE — 87086 URINE CULTURE/COLONY COUNT: CPT

## 2020-06-29 PROCEDURE — 99284 EMERGENCY DEPT VISIT MOD MDM: CPT | Mod: 25

## 2020-06-29 PROCEDURE — 99285 EMERGENCY DEPT VISIT HI MDM: CPT

## 2020-06-29 PROCEDURE — 93005 ELECTROCARDIOGRAM TRACING: CPT

## 2020-06-29 PROCEDURE — 71045 X-RAY EXAM CHEST 1 VIEW: CPT

## 2020-06-29 PROCEDURE — 90715 TDAP VACCINE 7 YRS/> IM: CPT

## 2020-06-29 PROCEDURE — 96367 TX/PROPH/DG ADDL SEQ IV INF: CPT

## 2020-06-29 PROCEDURE — 85730 THROMBOPLASTIN TIME PARTIAL: CPT

## 2020-06-29 PROCEDURE — 73522 X-RAY EXAM HIPS BI 3-4 VIEWS: CPT

## 2020-06-29 PROCEDURE — 80053 COMPREHEN METABOLIC PANEL: CPT

## 2020-06-29 PROCEDURE — 81001 URINALYSIS AUTO W/SCOPE: CPT

## 2020-06-29 PROCEDURE — 87040 BLOOD CULTURE FOR BACTERIA: CPT

## 2020-06-29 PROCEDURE — 70450 CT HEAD/BRAIN W/O DYE: CPT

## 2020-06-29 PROCEDURE — 83605 ASSAY OF LACTIC ACID: CPT

## 2020-06-29 PROCEDURE — 72125 CT NECK SPINE W/O DYE: CPT | Mod: 26

## 2020-06-29 PROCEDURE — 85027 COMPLETE CBC AUTOMATED: CPT

## 2020-06-29 PROCEDURE — 85610 PROTHROMBIN TIME: CPT

## 2020-06-29 PROCEDURE — 36415 COLL VENOUS BLD VENIPUNCTURE: CPT

## 2020-06-29 PROCEDURE — 83880 ASSAY OF NATRIURETIC PEPTIDE: CPT

## 2020-06-29 RX ORDER — ACETAMINOPHEN 500 MG
650 TABLET ORAL ONCE
Refills: 0 | Status: COMPLETED | OUTPATIENT
Start: 2020-06-29 | End: 2020-06-29

## 2020-06-29 RX ORDER — CEFEPIME 1 G/1
1000 INJECTION, POWDER, FOR SOLUTION INTRAMUSCULAR; INTRAVENOUS ONCE
Refills: 0 | Status: COMPLETED | OUTPATIENT
Start: 2020-06-29 | End: 2020-06-29

## 2020-06-29 RX ORDER — TETANUS TOXOID, REDUCED DIPHTHERIA TOXOID AND ACELLULAR PERTUSSIS VACCINE, ADSORBED 5; 2.5; 8; 8; 2.5 [IU]/.5ML; [IU]/.5ML; UG/.5ML; UG/.5ML; UG/.5ML
0.5 SUSPENSION INTRAMUSCULAR ONCE
Refills: 0 | Status: COMPLETED | OUTPATIENT
Start: 2020-06-29 | End: 2020-06-29

## 2020-06-29 RX ORDER — SODIUM CHLORIDE 9 MG/ML
2300 INJECTION INTRAMUSCULAR; INTRAVENOUS; SUBCUTANEOUS ONCE
Refills: 0 | Status: COMPLETED | OUTPATIENT
Start: 2020-06-29 | End: 2020-06-29

## 2020-06-29 RX ORDER — CEFPODOXIME PROXETIL 100 MG
1 TABLET ORAL
Qty: 14 | Refills: 0
Start: 2020-06-29 | End: 2020-07-05

## 2020-06-29 RX ORDER — SODIUM CHLORIDE 9 MG/ML
2300 INJECTION, SOLUTION INTRAVENOUS ONCE
Refills: 0 | Status: DISCONTINUED | OUTPATIENT
Start: 2020-06-29 | End: 2020-06-29

## 2020-06-29 RX ORDER — VANCOMYCIN HCL 1 G
1000 VIAL (EA) INTRAVENOUS ONCE
Refills: 0 | Status: COMPLETED | OUTPATIENT
Start: 2020-06-29 | End: 2020-06-29

## 2020-06-29 RX ADMIN — SODIUM CHLORIDE 2300 MILLILITER(S): 9 INJECTION INTRAMUSCULAR; INTRAVENOUS; SUBCUTANEOUS at 01:44

## 2020-06-29 RX ADMIN — Medication 1000 MILLIGRAM(S): at 02:55

## 2020-06-29 RX ADMIN — CEFEPIME 100 MILLIGRAM(S): 1 INJECTION, POWDER, FOR SOLUTION INTRAMUSCULAR; INTRAVENOUS at 01:15

## 2020-06-29 RX ADMIN — CEFEPIME 1000 MILLIGRAM(S): 1 INJECTION, POWDER, FOR SOLUTION INTRAMUSCULAR; INTRAVENOUS at 01:45

## 2020-06-29 RX ADMIN — Medication 650 MILLIGRAM(S): at 00:59

## 2020-06-29 RX ADMIN — Medication 250 MILLIGRAM(S): at 01:52

## 2020-06-29 RX ADMIN — TETANUS TOXOID, REDUCED DIPHTHERIA TOXOID AND ACELLULAR PERTUSSIS VACCINE, ADSORBED 0.5 MILLILITER(S): 5; 2.5; 8; 8; 2.5 SUSPENSION INTRAMUSCULAR at 01:45

## 2020-06-29 RX ADMIN — SODIUM CHLORIDE 2300 MILLILITER(S): 9 INJECTION INTRAMUSCULAR; INTRAVENOUS; SUBCUTANEOUS at 02:45

## 2020-06-29 NOTE — ED PROVIDER NOTE - CARE PLAN
Principal Discharge DX:	Acute cystitis without hematuria  Secondary Diagnosis:	Scalp laceration, initial encounter

## 2020-06-29 NOTE — ED ADULT TRIAGE NOTE - CHIEF COMPLAINT QUOTE
Pt presents to ED BIB EMS from home s/p fall. Pt was walking & fell down, hit back of head on metal railing, sustained laceration to back of head left side. Pt c/o left hip & lower back pain. Pt has history of dementia & left hip fracture.

## 2020-06-29 NOTE — ED PROVIDER NOTE - PHYSICAL EXAMINATION
VITAL SIGNS: I have reviewed nursing notes and confirm.   GEN: Well-developed; well-nourished; in no acute distress. speaking but Uzbek/[place] Zealander language. no resp distress, no coughing.  SKIN: Warm, no rash, no diaphoresis, no cyanosis, well perfused.   HEAD: Normocephalic; (+) left occipital scalp 1.5cm laceration, dried blood.   NECK: Supple; non tender. no step offs  EYES: PERRL/EOMI 3mm reactive, conjunctiva and sclera clear. no periorbital ecchymosis b/l  ENT: No nasal discharge; airway clear. no nasal septal hematoma  CV: S1, S2 normal; no murmurs, gallops, or rubs. RRR. No LE pitting edema B/L. Cap refill < 2sec throughout. Distal pulses intact 2+ throughout.  RESP: CTA B/L, No wheezes, rales or rhonchi.   BACK: No thoracolumbar step-offs or deformities. no ecchymosis or abrasions  ABD: Normal bowel sounds; soft; ND; NT, no hepatosplenomegaly. no b/l flank or periumbilical ecchymosis.  EXT: Normal ROM and moving all 4 x extremities. No apparent joint or muscular pain throughout except: bilateral hip L > R pain. No clubbing.  NEURO: Alert, oriented x 0. Grossly unremarkable. unable to follow commands due to dementia. motor appears intact throughout. No obvious focal deficits.

## 2020-06-29 NOTE — ED PROVIDER NOTE - CLINICAL SUMMARY MEDICAL DECISION MAKING FREE TEXT BOX
Fall, unwitnessed w/ scalp laceration, (+) febrile in ED, HR 90s - sepsis and covid-19 rule out.   - sepsis labs, lactate, COVID-19.  - IVF NS bolus 30 cc/kg, IV Abx cefepime/vanc  - CXR, UA  - CT HEAD, neck, b/l hips/pelvis for traumatic fall.  - scalp repair, dermabond and tetanus.  - re-eval

## 2020-06-29 NOTE — ED PROVIDER NOTE - OBJECTIVE STATEMENT
89M PMHx 89M PMHx hx of dementia, multiple falls s/p recent ORIF for left hip (2019), HTN, HLD, CKD, perforated stomach s/p partial gastrectomy, cholecystectomy presenting with unwitnessed fall today. Hx obtained from daughter as patient w/ dementia and unable to provide a history. Patient's aide heard a thump in the hallway at home, found the patient on the floor w/ resultant left occipital scalp laceration & with apparent hip pain w/ grimacing. 89M PMHx hx of dementia (needs assistance w/ walking, A&Ox0, and unable to follow commands at baseline), multiple falls s/p recent ORIF for left hip (2019), HTN, HLD, CKD, perforated stomach s/p partial gastrectomy, cholecystectomy presenting with unwitnessed fall today. Hx obtained from daughter as patient w/ dementia and unable to provide a history. Patient's aide heard a thump in the hallway at home, found the patient on the floor w/ resultant left occipital scalp laceration & with apparent hip pain w/ grimacing. After fall, was at baseline mental status. Otherwise, no apparent difficulty work of breathing, chest pain, abdominal pain, other extremity pain/injury, neck pain, headaches, n/v.

## 2020-06-29 NOTE — ED PROVIDER NOTE - NSFOLLOWUPINSTRUCTIONS_ED_ALL_ED_FT
Return to the emergency room if you experience worsening symptoms, fevers, vomiting, headaches, neck pain, abnormal behavior, abdominal pain, or new concerning symptoms.    Follow up with your primary care doctor in 1-2 days. Return to the emergency room if you experience worsening symptoms, fevers, vomiting, headaches, neck pain, abnormal behavior, abdominal pain, or new concerning symptoms.    Follow up with your primary care doctor in 1-2 days.    Keep hair dry for 24-48 hours. Come back to the emergency room if scalp laceration appears with more swelling, redness, yellow-drainage, or new concerning symptoms.    Take tylenol 650 mg orally every 6-8 hours for fever control as needed.    take antibiotics as directed.

## 2020-06-29 NOTE — ED PROVIDER NOTE - PATIENT PORTAL LINK FT
You can access the FollowMyHealth Patient Portal offered by [facility] by registering at the following website: [email or web address]. By joining [email or web address]’s FollowMyHealth portal, you will also be able to view your health information using other applications (apps) compatible with our system.

## 2020-06-29 NOTE — ED PROVIDER NOTE - PROGRESS NOTE DETAILS
lactate 2.0, (+) UTI, mild leukocytosis. Hemodynamically stable. D/w daughter about lab/diagnostic results. She would like to take the patient home, and understands that we will contact her for any positive BCx and UCx and may need to return to the emergency room. Return precautions given to daughter as she is reliable. Patient is hemodynamically stable, shuffling gait is baseline, mentating at baseline, to be discharged home.

## 2020-06-29 NOTE — ED ADULT NURSE NOTE - OBJECTIVE STATEMENT
Pt presents to ED BIB EMS from home s/p fall. Pt has history of dementia, fractured left hip & frequent falls. As per pt family who got information from live in home aid, Pt was walking at home on wood floor with socks on & had an unwitnessed fall.  Pt sustained a laceration to left side back of head. Pt c/o left hip & lower back pain. Pt is not on blood thinners, however has fever of 103.1. No apparent signs of SOB or distress noted.

## 2020-06-30 LAB
CULTURE RESULTS: NO GROWTH — SIGNIFICANT CHANGE UP
SPECIMEN SOURCE: SIGNIFICANT CHANGE UP

## 2020-07-04 LAB
CULTURE RESULTS: SIGNIFICANT CHANGE UP
CULTURE RESULTS: SIGNIFICANT CHANGE UP
SPECIMEN SOURCE: SIGNIFICANT CHANGE UP
SPECIMEN SOURCE: SIGNIFICANT CHANGE UP

## 2020-07-07 ENCOUNTER — APPOINTMENT (OUTPATIENT)
Dept: FAMILY MEDICINE | Facility: HOME HEALTH | Age: 85
End: 2020-07-07
Payer: MEDICARE

## 2020-07-07 PROCEDURE — 99349 HOME/RES VST EST MOD MDM 40: CPT

## 2020-07-10 ENCOUNTER — RX RENEWAL (OUTPATIENT)
Age: 85
End: 2020-07-10

## 2020-07-26 ENCOUNTER — RX RENEWAL (OUTPATIENT)
Age: 85
End: 2020-07-26

## 2020-08-26 ENCOUNTER — RX RENEWAL (OUTPATIENT)
Age: 85
End: 2020-08-26

## 2020-09-27 ENCOUNTER — RX RENEWAL (OUTPATIENT)
Age: 85
End: 2020-09-27

## 2020-09-28 ENCOUNTER — RX RENEWAL (OUTPATIENT)
Age: 85
End: 2020-09-28

## 2020-10-10 LAB
APPEARANCE: CLEAR
BACTERIA: NEGATIVE
BILIRUBIN URINE: NEGATIVE
BLOOD URINE: NEGATIVE
COLOR: YELLOW
GLUCOSE QUALITATIVE U: NEGATIVE
HYALINE CASTS: 0 /LPF
KETONES URINE: NEGATIVE
LEUKOCYTE ESTERASE URINE: NEGATIVE
MICROSCOPIC-UA: NORMAL
NITRITE URINE: NEGATIVE
PH URINE: 6
PROTEIN URINE: NORMAL
RED BLOOD CELLS URINE: 0 /HPF
SPECIFIC GRAVITY URINE: 1.02
SQUAMOUS EPITHELIAL CELLS: 0 /HPF
UROBILINOGEN URINE: NORMAL
WHITE BLOOD CELLS URINE: 1 /HPF

## 2020-10-12 LAB — BACTERIA UR CULT: NORMAL

## 2020-10-13 ENCOUNTER — APPOINTMENT (OUTPATIENT)
Dept: FAMILY MEDICINE | Facility: HOME HEALTH | Age: 85
End: 2020-10-13
Payer: MEDICARE

## 2020-10-13 ENCOUNTER — MED ADMIN CHARGE (OUTPATIENT)
Age: 85
End: 2020-10-13

## 2020-10-13 PROCEDURE — 90686 IIV4 VACC NO PRSV 0.5 ML IM: CPT

## 2020-10-13 PROCEDURE — G0008: CPT

## 2020-10-13 PROCEDURE — 99349 HOME/RES VST EST MOD MDM 40: CPT | Mod: 25

## 2020-10-24 ENCOUNTER — INPATIENT (INPATIENT)
Facility: HOSPITAL | Age: 85
LOS: 0 days | Discharge: HOME | DRG: 682 | End: 2020-10-25
Attending: STUDENT IN AN ORGANIZED HEALTH CARE EDUCATION/TRAINING PROGRAM | Admitting: HOSPITALIST
Payer: COMMERCIAL

## 2020-10-24 VITALS
OXYGEN SATURATION: 95 % | DIASTOLIC BLOOD PRESSURE: 76 MMHG | HEART RATE: 73 BPM | TEMPERATURE: 99 F | RESPIRATION RATE: 18 BRPM | HEIGHT: 78 IN | WEIGHT: 175.05 LBS | SYSTOLIC BLOOD PRESSURE: 182 MMHG

## 2020-10-24 DIAGNOSIS — Z90.49 ACQUIRED ABSENCE OF OTHER SPECIFIED PARTS OF DIGESTIVE TRACT: Chronic | ICD-10-CM

## 2020-10-24 DIAGNOSIS — Z98.890 OTHER SPECIFIED POSTPROCEDURAL STATES: Chronic | ICD-10-CM

## 2020-10-24 DIAGNOSIS — J69.0 PNEUMONITIS DUE TO INHALATION OF FOOD AND VOMIT: ICD-10-CM

## 2020-10-24 LAB
ALBUMIN SERPL ELPH-MCNC: 3.7 G/DL — SIGNIFICANT CHANGE UP (ref 3.3–5)
ALP SERPL-CCNC: 119 U/L — SIGNIFICANT CHANGE UP (ref 40–120)
ALT FLD-CCNC: 21 U/L — SIGNIFICANT CHANGE UP (ref 10–45)
ANION GAP SERPL CALC-SCNC: 11 MMOL/L — SIGNIFICANT CHANGE UP (ref 5–17)
APPEARANCE UR: CLEAR — SIGNIFICANT CHANGE UP
APTT BLD: 26.6 SEC — LOW (ref 27.5–35.5)
AST SERPL-CCNC: 22 U/L — SIGNIFICANT CHANGE UP (ref 10–40)
BACTERIA # UR AUTO: NEGATIVE /HPF — SIGNIFICANT CHANGE UP
BASOPHILS # BLD AUTO: 0.02 K/UL — SIGNIFICANT CHANGE UP (ref 0–0.2)
BASOPHILS NFR BLD AUTO: 0.2 % — SIGNIFICANT CHANGE UP (ref 0–2)
BILIRUB SERPL-MCNC: 0.6 MG/DL — SIGNIFICANT CHANGE UP (ref 0.2–1.2)
BILIRUB UR-MCNC: NEGATIVE — SIGNIFICANT CHANGE UP
BUN SERPL-MCNC: 36 MG/DL — HIGH (ref 7–23)
CALCIUM SERPL-MCNC: 10.1 MG/DL — SIGNIFICANT CHANGE UP (ref 8.4–10.5)
CHLORIDE SERPL-SCNC: 106 MMOL/L — SIGNIFICANT CHANGE UP (ref 96–108)
CO2 SERPL-SCNC: 24 MMOL/L — SIGNIFICANT CHANGE UP (ref 22–31)
COLOR SPEC: YELLOW — SIGNIFICANT CHANGE UP
CREAT SERPL-MCNC: 2.96 MG/DL — HIGH (ref 0.5–1.3)
DIFF PNL FLD: ABNORMAL
EOSINOPHIL # BLD AUTO: 0.11 K/UL — SIGNIFICANT CHANGE UP (ref 0–0.5)
EOSINOPHIL NFR BLD AUTO: 0.9 % — SIGNIFICANT CHANGE UP (ref 0–6)
EPI CELLS # UR: SIGNIFICANT CHANGE UP
GLUCOSE SERPL-MCNC: 155 MG/DL — HIGH (ref 70–99)
GLUCOSE UR QL: NEGATIVE — SIGNIFICANT CHANGE UP
HCT VFR BLD CALC: 44 % — SIGNIFICANT CHANGE UP (ref 39–50)
HGB BLD-MCNC: 14.2 G/DL — SIGNIFICANT CHANGE UP (ref 13–17)
IMM GRANULOCYTES NFR BLD AUTO: 0.3 % — SIGNIFICANT CHANGE UP (ref 0–1.5)
INR BLD: 1.04 RATIO — SIGNIFICANT CHANGE UP (ref 0.88–1.16)
KETONES UR-MCNC: NEGATIVE — SIGNIFICANT CHANGE UP
LACTATE SERPL-SCNC: 3.3 MMOL/L — HIGH (ref 0.7–2)
LACTATE SERPL-SCNC: 4.3 MMOL/L — CRITICAL HIGH (ref 0.7–2)
LACTATE SERPL-SCNC: 5.4 MMOL/L — CRITICAL HIGH (ref 0.7–2)
LEUKOCYTE ESTERASE UR-ACNC: NEGATIVE — SIGNIFICANT CHANGE UP
LYMPHOCYTES # BLD AUTO: 0.43 K/UL — LOW (ref 1–3.3)
LYMPHOCYTES # BLD AUTO: 3.6 % — LOW (ref 13–44)
MCHC RBC-ENTMCNC: 31.9 PG — SIGNIFICANT CHANGE UP (ref 27–34)
MCHC RBC-ENTMCNC: 32.3 GM/DL — SIGNIFICANT CHANGE UP (ref 32–36)
MCV RBC AUTO: 98.9 FL — SIGNIFICANT CHANGE UP (ref 80–100)
MONOCYTES # BLD AUTO: 0.22 K/UL — SIGNIFICANT CHANGE UP (ref 0–0.9)
MONOCYTES NFR BLD AUTO: 1.9 % — LOW (ref 2–14)
NEUTROPHILS # BLD AUTO: 11.06 K/UL — HIGH (ref 1.8–7.4)
NEUTROPHILS NFR BLD AUTO: 93.1 % — HIGH (ref 43–77)
NITRITE UR-MCNC: NEGATIVE — SIGNIFICANT CHANGE UP
NRBC # BLD: 0 /100 WBCS — SIGNIFICANT CHANGE UP (ref 0–0)
PH UR: 7 — SIGNIFICANT CHANGE UP (ref 5–8)
PLATELET # BLD AUTO: 211 K/UL — SIGNIFICANT CHANGE UP (ref 150–400)
POTASSIUM SERPL-MCNC: 3.8 MMOL/L — SIGNIFICANT CHANGE UP (ref 3.5–5.3)
POTASSIUM SERPL-SCNC: 3.8 MMOL/L — SIGNIFICANT CHANGE UP (ref 3.5–5.3)
PROT SERPL-MCNC: 7.5 G/DL — SIGNIFICANT CHANGE UP (ref 6–8.3)
PROT UR-MCNC: 30 MG/DL
PROTHROM AB SERPL-ACNC: 12.6 SEC — SIGNIFICANT CHANGE UP (ref 10.6–13.6)
RBC # BLD: 4.45 M/UL — SIGNIFICANT CHANGE UP (ref 4.2–5.8)
RBC # FLD: 12.2 % — SIGNIFICANT CHANGE UP (ref 10.3–14.5)
RBC CASTS # UR COMP ASSIST: SIGNIFICANT CHANGE UP /HPF (ref 0–4)
SARS-COV-2 RNA SPEC QL NAA+PROBE: SIGNIFICANT CHANGE UP
SODIUM SERPL-SCNC: 141 MMOL/L — SIGNIFICANT CHANGE UP (ref 135–145)
SP GR SPEC: 1.01 — SIGNIFICANT CHANGE UP (ref 1.01–1.02)
UROBILINOGEN FLD QL: NEGATIVE — SIGNIFICANT CHANGE UP
WBC # BLD: 11.88 K/UL — HIGH (ref 3.8–10.5)
WBC # FLD AUTO: 11.88 K/UL — HIGH (ref 3.8–10.5)
WBC UR QL: NEGATIVE /HPF — SIGNIFICANT CHANGE UP (ref 0–5)

## 2020-10-24 PROCEDURE — 93010 ELECTROCARDIOGRAM REPORT: CPT

## 2020-10-24 PROCEDURE — 71045 X-RAY EXAM CHEST 1 VIEW: CPT | Mod: 26

## 2020-10-24 PROCEDURE — 99223 1ST HOSP IP/OBS HIGH 75: CPT

## 2020-10-24 PROCEDURE — 99285 EMERGENCY DEPT VISIT HI MDM: CPT

## 2020-10-24 PROCEDURE — 74176 CT ABD & PELVIS W/O CONTRAST: CPT | Mod: 26

## 2020-10-24 RX ORDER — METOPROLOL TARTRATE 50 MG
50 TABLET ORAL DAILY
Refills: 0 | Status: DISCONTINUED | OUTPATIENT
Start: 2020-10-24 | End: 2020-10-25

## 2020-10-24 RX ORDER — ALBUMIN HUMAN 25 %
100 VIAL (ML) INTRAVENOUS ONCE
Refills: 0 | Status: COMPLETED | OUTPATIENT
Start: 2020-10-24 | End: 2020-10-24

## 2020-10-24 RX ORDER — METRONIDAZOLE 500 MG
500 TABLET ORAL ONCE
Refills: 0 | Status: COMPLETED | OUTPATIENT
Start: 2020-10-24 | End: 2020-10-24

## 2020-10-24 RX ORDER — SODIUM CHLORIDE 9 MG/ML
250 INJECTION, SOLUTION INTRAVENOUS ONCE
Refills: 0 | Status: COMPLETED | OUTPATIENT
Start: 2020-10-24 | End: 2020-10-24

## 2020-10-24 RX ORDER — SODIUM CHLORIDE 9 MG/ML
2500 INJECTION INTRAMUSCULAR; INTRAVENOUS; SUBCUTANEOUS ONCE
Refills: 0 | Status: COMPLETED | OUTPATIENT
Start: 2020-10-24 | End: 2020-10-24

## 2020-10-24 RX ORDER — MEMANTINE HYDROCHLORIDE 10 MG/1
10 TABLET ORAL
Refills: 0 | Status: DISCONTINUED | OUTPATIENT
Start: 2020-10-24 | End: 2020-10-25

## 2020-10-24 RX ORDER — SODIUM CHLORIDE 9 MG/ML
1000 INJECTION INTRAMUSCULAR; INTRAVENOUS; SUBCUTANEOUS
Refills: 0 | Status: DISCONTINUED | OUTPATIENT
Start: 2020-10-24 | End: 2020-10-24

## 2020-10-24 RX ORDER — ESCITALOPRAM OXALATE 10 MG/1
5 TABLET, FILM COATED ORAL DAILY
Refills: 0 | Status: DISCONTINUED | OUTPATIENT
Start: 2020-10-24 | End: 2020-10-25

## 2020-10-24 RX ORDER — ONDANSETRON 8 MG/1
4 TABLET, FILM COATED ORAL ONCE
Refills: 0 | Status: COMPLETED | OUTPATIENT
Start: 2020-10-24 | End: 2020-10-24

## 2020-10-24 RX ORDER — CITALOPRAM 10 MG/1
10 TABLET, FILM COATED ORAL DAILY
Refills: 0 | Status: DISCONTINUED | OUTPATIENT
Start: 2020-10-24 | End: 2020-10-24

## 2020-10-24 RX ORDER — MIRTAZAPINE 45 MG/1
1 TABLET, ORALLY DISINTEGRATING ORAL
Qty: 0 | Refills: 0 | DISCHARGE

## 2020-10-24 RX ORDER — CEFEPIME 1 G/1
2000 INJECTION, POWDER, FOR SOLUTION INTRAMUSCULAR; INTRAVENOUS ONCE
Refills: 0 | Status: COMPLETED | OUTPATIENT
Start: 2020-10-24 | End: 2020-10-24

## 2020-10-24 RX ORDER — OLANZAPINE 15 MG/1
5 TABLET, FILM COATED ORAL
Refills: 0 | Status: DISCONTINUED | OUTPATIENT
Start: 2020-10-24 | End: 2020-10-25

## 2020-10-24 RX ORDER — ALBUTEROL 90 UG/1
1 AEROSOL, METERED ORAL ONCE
Refills: 0 | Status: COMPLETED | OUTPATIENT
Start: 2020-10-24 | End: 2020-10-24

## 2020-10-24 RX ORDER — MIRTAZAPINE 45 MG/1
30 TABLET, ORALLY DISINTEGRATING ORAL AT BEDTIME
Refills: 0 | Status: DISCONTINUED | OUTPATIENT
Start: 2020-10-24 | End: 2020-10-25

## 2020-10-24 RX ORDER — HEPARIN SODIUM 5000 [USP'U]/ML
5000 INJECTION INTRAVENOUS; SUBCUTANEOUS EVERY 8 HOURS
Refills: 0 | Status: DISCONTINUED | OUTPATIENT
Start: 2020-10-24 | End: 2020-10-25

## 2020-10-24 RX ORDER — SODIUM CHLORIDE 9 MG/ML
500 INJECTION INTRAMUSCULAR; INTRAVENOUS; SUBCUTANEOUS ONCE
Refills: 0 | Status: DISCONTINUED | OUTPATIENT
Start: 2020-10-24 | End: 2020-10-24

## 2020-10-24 RX ORDER — FAMOTIDINE 10 MG/ML
20 INJECTION INTRAVENOUS ONCE
Refills: 0 | Status: COMPLETED | OUTPATIENT
Start: 2020-10-24 | End: 2020-10-24

## 2020-10-24 RX ORDER — RIVASTIGMINE 4.6 MG/24H
1 PATCH, EXTENDED RELEASE TRANSDERMAL EVERY 24 HOURS
Refills: 0 | Status: DISCONTINUED | OUTPATIENT
Start: 2020-10-24 | End: 2020-10-25

## 2020-10-24 RX ORDER — SODIUM CHLORIDE 9 MG/ML
1000 INJECTION INTRAMUSCULAR; INTRAVENOUS; SUBCUTANEOUS ONCE
Refills: 0 | Status: DISCONTINUED | OUTPATIENT
Start: 2020-10-24 | End: 2020-10-24

## 2020-10-24 RX ADMIN — OLANZAPINE 5 MILLIGRAM(S): 15 TABLET, FILM COATED ORAL at 18:38

## 2020-10-24 RX ADMIN — SODIUM CHLORIDE 500 MILLILITER(S): 9 INJECTION, SOLUTION INTRAVENOUS at 23:00

## 2020-10-24 RX ADMIN — CEFEPIME 100 MILLIGRAM(S): 1 INJECTION, POWDER, FOR SOLUTION INTRAMUSCULAR; INTRAVENOUS at 12:15

## 2020-10-24 RX ADMIN — CEFEPIME 2000 MILLIGRAM(S): 1 INJECTION, POWDER, FOR SOLUTION INTRAMUSCULAR; INTRAVENOUS at 12:45

## 2020-10-24 RX ADMIN — FAMOTIDINE 100 MILLIGRAM(S): 10 INJECTION INTRAVENOUS at 11:15

## 2020-10-24 RX ADMIN — SODIUM CHLORIDE 2500 MILLILITER(S): 9 INJECTION INTRAMUSCULAR; INTRAVENOUS; SUBCUTANEOUS at 11:05

## 2020-10-24 RX ADMIN — ONDANSETRON 4 MILLIGRAM(S): 8 TABLET, FILM COATED ORAL at 11:15

## 2020-10-24 RX ADMIN — HEPARIN SODIUM 5000 UNIT(S): 5000 INJECTION INTRAVENOUS; SUBCUTANEOUS at 23:03

## 2020-10-24 RX ADMIN — Medication 50 MILLILITER(S): at 23:37

## 2020-10-24 RX ADMIN — Medication 100 MILLIGRAM(S): at 12:54

## 2020-10-24 RX ADMIN — MIRTAZAPINE 30 MILLIGRAM(S): 45 TABLET, ORALLY DISINTEGRATING ORAL at 23:21

## 2020-10-24 RX ADMIN — FAMOTIDINE 20 MILLIGRAM(S): 10 INJECTION INTRAVENOUS at 11:45

## 2020-10-24 RX ADMIN — ALBUTEROL 1 PUFF(S): 90 AEROSOL, METERED ORAL at 21:38

## 2020-10-24 RX ADMIN — Medication 50 MILLIGRAM(S): at 18:41

## 2020-10-24 RX ADMIN — MEMANTINE HYDROCHLORIDE 10 MILLIGRAM(S): 10 TABLET ORAL at 18:41

## 2020-10-24 NOTE — ED PROVIDER NOTE - PSH
H/O abdominal surgery  with partial gastrectomy sec to ruptured stomach  History of cholecystectomy

## 2020-10-24 NOTE — PATIENT PROFILE ADULT - PACKS PER DAY
"Requested Prescriptions   Pending Prescriptions Disp Refills     levothyroxine (SYNTHROID/LEVOTHROID) 137 MCG tablet [Pharmacy Med Name: LEVOTHYROXINE SODIUM 137MCG TABS]    Last Written Prescription Date:  9/9/2018  Last Fill Quantity: 90,  # refills: 3   Last office visit: 4/26/2019 with prescribing provider:  Anai Dorantes     Future Office Visit:     90 tablet 3     Sig: TAKE ONE TABLET BY MOUTH EVERY DAY       Thyroid Protocol Passed - 9/3/2019  6:01 AM        Passed - Patient is 12 years or older        Passed - Recent (12 mo) or future (30 days) visit within the authorizing provider's specialty     Patient had office visit in the last 12 months or has a visit in the next 30 days with authorizing provider or within the authorizing provider's specialty.  See \"Patient Info\" tab in inbasket, or \"Choose Columns\" in Meds & Orders section of the refill encounter.              Passed - Medication is active on med list        Passed - Normal TSH on file in past 12 months     Recent Labs   Lab Test 04/26/19  0936   TSH 1.42              Passed - No active pregnancy on record     If patient is pregnant or has had a positive pregnancy test, please check TSH.          Passed - No positive pregnancy test in past 12 months     If patient is pregnant or has had a positive pregnancy test, please check TSH.            "
Prescription approved per [facility] [place].  Jeff Horner, RN, BSN        
0

## 2020-10-24 NOTE — ED PROVIDER NOTE - CARE PLAN
Principal Discharge DX:	Non-intractable vomiting with nausea   Principal Discharge DX:	Non-intractable vomiting with nausea  Secondary Diagnosis:	Dehydration  Secondary Diagnosis:	EMILEE (acute kidney injury)   Principal Discharge DX:	Aspiration pneumonia of left lung, unspecified aspiration pneumonia type, unspecified part of lung  Secondary Diagnosis:	Dehydration  Secondary Diagnosis:	EMILEE (acute kidney injury)  Secondary Diagnosis:	Non-intractable vomiting with nausea

## 2020-10-24 NOTE — H&P ADULT - HISTORY OF PRESENT ILLNESS
89yo male with hx of advanced dementia due to Alzheimer's disease, Parkinsonism, CKD stage IV, HTN, presented to the ED accompanied by his daughter, Jose Thompson, with complaints of N/V x 2 this AM associated with agitation.   Pt is a poor historian. Hx provided by daughter. Per daughter, pt had vomited twice this AM while in supine position in bed. She states he was noted to have a mild productive cough afterwards. Denies difficulty with respiration, fever or chills.   Pt's wife past away 1 year ago from today, and was noted to call out his wife's name prior to the episode. Daughter states he speaks when he wants to and may or may not answer question. He has never had an episode of aspiration pneumonia in the past and eats regular food only softened/chopped.

## 2020-10-24 NOTE — ED ADULT TRIAGE NOTE - CHIEF COMPLAINT QUOTE
Per EMS " He vomited twice today from home and daughter states he has been agitated, hx of dementia "

## 2020-10-24 NOTE — ED PROVIDER NOTE - CLINICAL SUMMARY MEDICAL DECISION MAKING FREE TEXT BOX
Dr. Aragon: 89M h/o dementia, A&Ox0 at baseline, +ambulatory, lives at home with family and 24/7 aid, h/o partial gastrectomy and cholecystectomy in the past, BIBEMS for 2 episodes of NBNB emesis today and whole body shaking while awake. No fevers or chills, no constipation or diarrhea, no dysuria. No sick contacts. No covid exposure. On exam pt is well appearing, nad, minimally dry mucosa, RRR, CTAB, abdo soft/nt/nd with well healed midline laparotomy scar. Shared decision making performed with daughter re: ED tx - will get labs and CT a/p r/o sbo, IVF for dehydration, nausea meds.

## 2020-10-24 NOTE — ED PROVIDER NOTE - CONVERSATION DETAILS
Spoke with daughter Jose at bedside, she wants her father to remain DNR/DNI, but is okay with fluids, abx and imaging

## 2020-10-24 NOTE — ED PROVIDER NOTE - ATTENDING CONTRIBUTION TO CARE
Dr. Aragon: I performed a face to face bedside interview with patient regarding history of present illness, review of symptoms and past medical history. I completed an independent physical exam.  I have discussed patient's plan of care with PA.   I agree with note as stated above, having amended the EMR as needed to reflect my findings.   This includes HISTORY OF PRESENT ILLNESS, HIV, PAST MEDICAL/SURGICAL/FAMILY/SOCIAL HISTORY, ALLERGIES AND HOME MEDICATIONS, REVIEW OF SYSTEMS, PHYSICAL EXAM, and any PROGRESS NOTES during the time I functioned as the attending physician for this patient.    see mdm

## 2020-10-24 NOTE — H&P ADULT - ASSESSMENT
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 Would start Unasyn tomorrow and transition to Augmentin upon discharge  -No concern for respiratory failure with hypoxia  -Monitor vitals/o2 sats    #Elevated Lactate Acid  -Secondary to Pneumonia + Dehydration  -Follow up with repeat levels  -S/p IVF    #EMILEE on CKD Stage IV  -Prerenal etiology in the setting of N/V  -Baseline Cr 2.15-2.3  -S/p IVF  -Encourage PO intake  -Continue to monitor    #HTN  -Chronic  -Continue Metoprolol  -Monitor vitals    #Advance dementia  -At baseline AAOx 0  -Encourage reorientation  -Pt will benefit from discharge home as soon as possible  -Continue Olanzapine, Memantine, Rivastigmine     #Advance care planning  -MOLST in chart; DNR/DNI/ No Tube feeds  -Pt will benefit from discharge to home as soon as possible. If pt is stable with improvement in repeat bloodwork, would recommend discharge home tomorrow with Augmentin for 5 days in total and close follow up with PMD. Pt has 24hr care at home    #Polypharmacy  -Discussed with daughter dose reduction and discontinuation of medication in the elderly.  -Considering low risk for ACS, pt will benefit from stopping Atorvastatin. Pt's daughter agrees.  -At higher doses of Mirtazapine, it has been noted pt's to become over-stimulated. This could be contributing to pt's symptoms of shaking. Recommend gradual tapering of dose. Pt's daughter agreed. Changed dose from 45mg to 30mg QHS

## 2020-10-24 NOTE — ED ADULT NURSE NOTE - NS ED NURSE REPORT GIVEN TO FT
Epidural Block    Start time: 10/9/2017 12:52 AM  End time: 10/9/2017 12:59 AM  Performed by: Danika Ordoñez by: Sheree Haddad     Pre-Procedure  Indication: labor epidural    Preanesthetic Checklist: patient identified, risks and benefits discussed, anesthesia consent, patient being monitored, timeout performed and anesthesia consent    Timeout Time: 00:52        Epidural:   Patient position:  Seated  Prep region:  Lumbar  Prep: Patient draped and Chlorhexidine    Location:  L3-4    Needle and Epidural Catheter:   Needle Type:  Tuohy  Needle Gauge:  17 G  Injection Technique:  Loss of resistance using air  Attempts:  1  Catheter Size:  19 G  Events: no blood with aspiration, no cerebrospinal fluid with aspiration, no paresthesia and negative aspiration test    Test Dose:  Lidocaine 1.5% w/ epi and negative    Assessment:   Catheter Secured:  Tegaderm and tape  Insertion:  Uncomplicated  Patient tolerance:  Patient tolerated the procedure well with no immediate complications  All needles out intact, procedure tolerated well without problems
Mallory CLAYOTN

## 2020-10-24 NOTE — ED ADULT NURSE NOTE - OBJECTIVE STATEMENT
89 yr old male with PMH of dementia (AAOx 0 at baseline)  was BIBA  from home for 2 episodes of vomiting this morning and shaking (per daughter this is not unusual for him). No diarrhea. No fevers or chills. As per EMS pt's pulse ox drops to low 90's but improves with 2L NC.  Rectal temp- 97.6 in ED. No acute resp distress noted. Resp even and unlabored. Abd soft, NT, ND. +BS. +PP. Skin warm, dry and intact.  20 G IV placed to RT AC. Bloods obtained and sent. Pt straight cath'd using sterile technique. UA and C&S obtained and sent. Daughter at bedside. Safety maintained.

## 2020-10-24 NOTE — PROCEDURE NOTE - NSPROCDETAILS_GEN_ALL_CORE
sterile technique, catheter placed/ultrasound utilization/blood seen on insertion/dressing applied/flushes easily/secured in place/location identified, draped/prepped, sterile technique used/Ultrasound utilized throughout procedure. First to find adequate vessel and ensure vein was compressible. Second to visualize catheter tip entered vessel. Third to visualize wire entering vessel. Lastly, it was used to confirm that injected saline did not extravasate from vessel

## 2020-10-24 NOTE — CHART NOTE - NSCHARTNOTEFT_GEN_A_CORE
Interval events: Called by RN for critical value...    Review of Systems:  Constitutional: No fever, chills, fatigue  Neuro: No headache, numbness, weakness  Resp: No cough, wheezing, shortness of breath  CVS: No chest pain, palpitations, leg swelling  GI: No abdominal pain, nausea, vomiting, diarrhea   : No dysuria, frequency, incontinence  Skin: No itching, burning, rashes, or lesions   Msk: No joint pain or swelling  Psych: No depression, anxiety, mood swings    T(F): 98.7 (10-24-20 @ 20:18), Max: 99.1 (10-24-20 @ 16:09)  HR: 71 (10-24-20 @ 20:18) (70 - 83)  BP: 128/59 (10-24-20 @ 20:18) (128/59 - 182/76)  RR: 16 (10-24-20 @ 20:18) (16 - 23)  SpO2: 93% (10-24-20 @ 20:18) (92% - 98%)  Wt(kg): --      10-24-20 @ 07:01  -  10-24-20 @ 21:37  --------------------------------------------------------  IN: 240 mL / OUT: 0 mL / NET: 240 mL        CAPILLARY BLOOD GLUCOSE          I&O's Summary    24 Oct 2020 07:01  -  24 Oct 2020 21:37  --------------------------------------------------------  IN: 240 mL / OUT: 0 mL / NET: 240 mL        Physical Exam:   Gen:  Neuro:  HEENT:  Resp:  CVS:  Abd:  Ext:  Skin:    Meds:    metoprolol succinate ER Oral      ALBUTerol    90 MICROgram(s) HFA Inhaler Inhalation    escitalopram Oral  memantine Oral  mirtazapine Oral  OLANZapine Oral  rivastigmine patch  9.5 mG/24 Hr(s). Transdermal      heparin   Injectable SubCutaneous        albumin human 25% IVPB IV Intermittent  lactated ringers Bolus IV Bolus                                  14.2   11.88 )-----------( 211      ( 24 Oct 2020 11:05 )             44.0       10    141  |  106  |  36<H>  ----------------------------<  155<H>  3.8   |  24  |  2.96<H>    Ca    10.1      24 Oct 2020 11:05    TPro  7.5  /  Alb  3.7  /  TBili  0.6  /  DBili  x   /  AST  22  /  ALT  21  /  AlkPhos  119  10-24    Lactate 4.3           10-24 @ 17:30    Lactate 3.3           10-24 @ 13:00    Lactate 5.4           10-24 @ 11:05          PT/INR - ( 24 Oct 2020 11:05 )   PT: 12.6 sec;   INR: 1.04 ratio         PTT - ( 24 Oct 2020 11:05 )  PTT:26.6 sec  Urinalysis Basic - ( 24 Oct 2020 11:40 )    Color: Yellow / Appearance: Clear / S.015 / pH: x  Gluc: x / Ketone: Negative  / Bili: Negative / Urobili: Negative   Blood: x / Protein: 30 mg/dL / Nitrite: Negative   Leuk Esterase: Negative / RBC: 0-4 /HPF / WBC Negative /HPF   Sq Epi: x / Non Sq Epi: Neg.-Few / Bacteria: Negative /HPF                  Radiology:    A&P: Interval events: Called by RN for critical value of lactate 4.3. Chart reviewed, pt evaluated at bedside. Pt unable to provide history, Irish speaking however does not respond to  phone due to advanced dementia.   Vitals at time of evaluation: BP (128/59), HR 71, SpO2 97% on 3L nasal cannula.     Review of Systems unable to obtain due to medical condition.     T(F): 98.7 (10-24-20 @ 20:18), Max: 99.1 (10-24-20 @ 16:09)  HR: 71 (10-24-20 @ 20:18) (70 - 83)  BP: 128/59 (10-24-20 @ 20:18) (128/59 - 182/76)  RR: 16 (10-24-20 @ 20:18) (16 - 23)  SpO2: 93% (10-24-20 @ 20:18) (92% - 98%)  Wt(kg): --    10-24-20 @ 07:01  -  10-24-20 @ 21:37  --------------------------------------------------------  IN: 240 mL / OUT: 0 mL / NET: 240 mL    I&O's Summary  24 Oct 2020 07:01  -  24 Oct 2020 21:37  --------------------------------------------------------  IN: 240 mL / OUT: 0 mL / NET: 240 mL    Physical Exam:   Gen: laying in bed, NAD  Resp: diffuse wheezing heard b/l  CVS: RRR, S1S2+  Abd:  Ext:  Skin:    Meds:    metoprolol succinate ER Oral      ALBUTerol    90 MICROgram(s) HFA Inhaler Inhalation    escitalopram Oral  memantine Oral  mirtazapine Oral  OLANZapine Oral  rivastigmine patch  9.5 mG/24 Hr(s). Transdermal      heparin   Injectable SubCutaneous        albumin human 25% IVPB IV Intermittent  lactated ringers Bolus IV Bolus                                  14.2   11.88 )-----------( 211      ( 24 Oct 2020 11:05 )             44.0       10-24    141  |  106  |  36<H>  ----------------------------<  155<H>  3.8   |  24  |  2.96<H>    Ca    10.1      24 Oct 2020 11:05    TPro  7.5  /  Alb  3.7  /  TBili  0.6  /  DBili  x   /  AST  22  /  ALT  21  /  AlkPhos  119  10-24    Lactate 4.3           10-24 @ 17:30    Lactate 3.3           10-24 @ 13:00    Lactate 5.4           10-24 @ 11:05          PT/INR - ( 24 Oct 2020 11:05 )   PT: 12.6 sec;   INR: 1.04 ratio         PTT - ( 24 Oct 2020 11:05 )  PTT:26.6 sec  Urinalysis Basic - ( 24 Oct 2020 11:40 )    Color: Yellow / Appearance: Clear / S.015 / pH: x  Gluc: x / Ketone: Negative  / Bili: Negative / Urobili: Negative   Blood: x / Protein: 30 mg/dL / Nitrite: Negative   Leuk Esterase: Negative / RBC: 0-4 /HPF / WBC Negative /HPF   Sq Epi: x / Non Sq Epi: Neg.-Few / Bacteria: Negative /HPF                  Radiology:    A&P: Interval events: Called by RN for critical value of lactate 4.3. Chart reviewed, pt evaluated at bedside. Pt unable to provide history, Thai speaking however does not respond to  phone due to advanced dementia.   Vitals at time of evaluation: Temp 98.7F, BP (128/59), HR 71, SpO2 97% on 3L nasal cannula.     Review of Systems unable to obtain due to medical condition.     T(F): 98.7 (10-24-20 @ 20:18), Max: 99.1 (10-24-20 @ 16:09)  HR: 71 (10-24-20 @ 20:18) (70 - 83)  BP: 128/59 (10-24-20 @ 20:18) (128/59 - 182/76)  RR: 16 (10-24-20 @ 20:18) (16 - 23)  SpO2: 93% (10-24-20 @ 20:18) (92% - 98%)  Wt(kg): --    10-24-20 @ 07:01  -  10-24-20 @ 21:37  --------------------------------------------------------  IN: 240 mL / OUT: 0 mL / NET: 240 mL    I&O's Summary  24 Oct 2020 07:01  -  24 Oct 2020 21:37  --------------------------------------------------------  IN: 240 mL / OUT: 0 mL / NET: 240 mL    Physical Exam:   Gen: laying in bed, NAD  Resp: diffuse wheezing heard b/l, rales  CVS: RRR, S1S2+  Abd: nontender    Meds:  metoprolol succinate ER Oral  ALBUTerol    90 MICROgram(s) HFA Inhaler Inhalation  escitalopram Oral  memantine Oral  mirtazapine Oral  OLANZapine Oral  rivastigmine patch  9.5 mG/24 Hr(s). Transdermal  heparin  Injectable SubCutaneous  albumin human 25% IVPB IV Intermittent  lactated ringers Bolus IV Bolus                        14.2   11.88 )-----------( 211      ( 24 Oct 2020 11:05 )             44.0     10  141  |  106  |  36<H>  ----------------------------<  155<H>  3.8   |  24  |  2.96<H>    Ca    10.1      24 Oct 2020 11:05    TPro  7.5  /  Alb  3.7  /  TBili  0.6  /  DBili  x   /  AST  22  /  ALT  21  /  AlkPhos  119  10-24    Lactate 4.3           10-24 @ 17:30    Lactate 3.3           10-24 @ 13:00    Lactate 5.4           10-24 @ 11:05    PT/INR - ( 24 Oct 2020 11:05 )   PT: 12.6 sec;   INR: 1.04 ratio       PTT - ( 24 Oct 2020 11:05 )  PTT:26.6 sec  Urinalysis Basic - ( 24 Oct 2020 11:40 )    Color: Yellow / Appearance: Clear / S.015 / pH: x  Gluc: x / Ketone: Negative  / Bili: Negative / Urobili: Negative   Blood: x / Protein: 30 mg/dL / Nitrite: Negative   Leuk Esterase: Negative / RBC: 0-4 /HPF / WBC Negative /HPF   Sq Epi: x / Non Sq Epi: Neg.-Few / Bacteria: Negative /HPF    Radiology:  < from: CT Abdomen and Pelvis No Cont (10.24.20 @ 12:07) >  IMPRESSION:  Subpleural nonspecific left basilar airspace opacity. Infection is a differential consideration  No acute pathology in the abdomen/pelvis. Specifically, no evidence of bowel obstruction as questioned.  < end of copied text >    < from: Xray Chest 1 View AP/PA (10.24.20 @ 12:06) >  IMPRESSION: Airspace opacity seen in the left upper lobe and left base suspicious for multifocal pneumonia.  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   Lactate trend 5.4 to 3.3 to 4.3.   250cc bolus LR followed by albumin 25% 100mL once. Will trend lactate.  s/p cefepime and flagyl, unasyn to be started tomorrow  will continue to monitor closely  rest of care per primary team

## 2020-10-24 NOTE — H&P ADULT - NSICDXFAMILYHX_GEN_ALL_CORE_FT
FAMILY HISTORY:  Mother  Still living? Unknown  Family history of CVA, Age at diagnosis: Age Unknown

## 2020-10-24 NOTE — ED ADULT NURSE NOTE - NSIMPLEMENTINTERV_GEN_ALL_ED
Implemented All Fall with Harm Risk Interventions:  [place] to call system. Call bell, personal items and telephone within reach. Instruct patient to call for assistance. Room bathroom lighting operational. Non-slip footwear when patient is off stretcher. Physically safe environment: no spills, clutter or unnecessary equipment. Stretcher in lowest position, wheels locked, appropriate side rails in place. Provide visual cue, wrist band, yellow gown, etc. Monitor gait and stability. Monitor for mental status changes and reorient to person, place, and time. Review medications for side effects contributing to fall risk. Reinforce activity limits and safety measures with patient and family. Provide visual clues: red socks.

## 2020-10-24 NOTE — ED PROVIDER NOTE - PROGRESS NOTE DETAILS
PA Bissessar: labs reviewed. lactate 5.4. IVF bolus and abx given. CXR images reviewed-- possible left lung aspiration PNA. Abd CT results reviewed-- left basilar airspace opacity. Spoke with Hospitalist. patient admitted

## 2020-10-24 NOTE — ED PROVIDER NOTE - PRINCIPAL DIAGNOSIS
Non-intractable vomiting with nausea Aspiration pneumonia of left lung, unspecified aspiration pneumonia type, unspecified part of lung

## 2020-10-24 NOTE — ED PROVIDER NOTE - NSFOLLOWUPINSTRUCTIONS_ED_ALL_ED_FT
Dehydration    WHAT YOU NEED TO KNOW:    What is dehydration? Dehydration is a condition that develops when your body does not have enough fluid. You may become dehydrated if you do not drink enough water or lose too much fluid. Fluid loss may also cause loss of electrolytes (minerals), such as sodium.    What increases my risk for dehydration?   •Vomiting, diarrhea, or fever      •Being in the sun or heat for too long      •Sweating while playing sports      •Diseases, such as stroke, diabetes, or infections      •Medicines that cause you to lose water and salt, such as diuretics (water pills)      •Older age with decreased ability to sense thirst or to urinate      What are the signs and symptoms of dehydration?   •Dry eyes or mouth      •Increased thirst      •Dark yellow urine      •Urinating little or not at all      •Tiredness or body weakness      •Headache, dizziness, or confusion      •Irregular or fast breathing, fast or pounding heartbeat, and low blood pressure      •Sudden weight loss      How is dehydration diagnosed? Your healthcare provider will examine you and check your breathing and heartbeat. He or she will look at your eyes, skin, mouth, and tongue. He or she will ask you how much liquid you have been drinking, and how much you are urinating. Tell him or her if you have been vomiting or have diarrhea. Blood and urine tests are used to check your electrolyte levels. The tests may show the cause of your dehydration, such as infection or diabetes. They may also show if your kidneys are working correctly.    How is dehydration treated?   •Oral liquids: ?If you are mildly to moderately dehydrated, you may need an oral rehydration solution (ORS). This drink contains the right amount of salt, sugar, and minerals in water to replace body fluids. Ask your healthcare provider where you can get an ORS.       ?Drink an ORS in small amounts if you have been vomiting. If you vomit, wait 30 minutes and try again. Ask healthcare providers how much ORS you need when you are dehydrated and how often you should drink it.       ?A sports drink is not  the same as an ORS. Do not drink sports drinks without asking your healthcare provider.      ?Do not drink soft drinks or fruit juices. These can make your condition worse.       •You may receive fluid through an IV. Electrolytes may also be included in the fluid.      •Hypodermoclysis gives your body a large amount of water quickly. The water is given into the deepest layer of your skin. Ask your healthcare provider for more information about hypodermoclysis.      What can I do to prevent dehydration?   •Drink liquids as directed. Liquids that contain water, sugar, and minerals can help your body hold in fluid and help prevent dehydration. Drink liquids throughout the day, not just when you feel thirsty. Men should drink about 3 liters (13 eight-ounce cups) of liquid each day. Women should drink about 2 liters (9 eight-ounce cups) of liquid each day. Drink even more liquid if you will be outdoors, in the sun for a long time, or exercising.       •Stay cool. Limit the time you spend outdoors during the hottest part of the day. Dress in lightweight clothes.       •Keep track of how often you urinate. If you urinate less than usual or your urine is darker, drink more liquids.      When should I seek immediate care?   •You have a seizure.      •You are confused or cannot think clearly.      •You are extremely sleepy, or another person cannot wake you.       •You become dizzy or faint when you stand.      •You are not able to urinate.      •You have trouble breathing.      •You have a fast or irregular heartbeat.      •Your hands or feet are cold, or your face is pale.       When should I contact my healthcare provider?   •You have trouble drinking liquids because you are vomiting.      •Your symptoms get worse.      •You have a fever.       •You feel very weak or tired.      •You have questions or concerns about your condition or care.      CARE AGREEMENT:    You have the right to help plan your care. Learn about your health condition and how it may be treated. Discuss treatment options with your healthcare providers to decide what care you want to receive. You always have the right to refuse treatment.

## 2020-10-24 NOTE — ED PROVIDER NOTE - OBJECTIVE STATEMENT
89 y/o M with PMH of dementia (AAOx 0 at baseline), left ORIF, partial gastrectomy, cholecystectomy  was BIB EMS from home for 2 episodes of NBNB emesis x this morning and shaking (per daughter this is not unusual for him). No reports of diarrhea, cough, f/c, sick contacts or recent falls.

## 2020-10-24 NOTE — H&P ADULT - NSHPSOCIALHISTORY_GEN_ALL_CORE
Lives at home with 24hr HHA (2 aides 12hrs shifts)  Walks with assistance and should walk with a walker but does not  No smoking, EtOH use

## 2020-10-24 NOTE — PROCEDURE NOTE - ADDITIONAL PROCEDURE DETAILS
Time spent on procedure independent of Critical Care time spent at the bedside  Indication for procedure: Sepsis  Dx Code: A41.9

## 2020-10-25 ENCOUNTER — TRANSCRIPTION ENCOUNTER (OUTPATIENT)
Age: 85
End: 2020-10-25

## 2020-10-25 ENCOUNTER — RX RENEWAL (OUTPATIENT)
Age: 85
End: 2020-10-25

## 2020-10-25 VITALS
DIASTOLIC BLOOD PRESSURE: 58 MMHG | RESPIRATION RATE: 15 BRPM | HEART RATE: 72 BPM | OXYGEN SATURATION: 95 % | TEMPERATURE: 99 F | SYSTOLIC BLOOD PRESSURE: 121 MMHG

## 2020-10-25 LAB
ANION GAP SERPL CALC-SCNC: 7 MMOL/L — SIGNIFICANT CHANGE UP (ref 5–17)
BASOPHILS # BLD AUTO: 0.04 K/UL — SIGNIFICANT CHANGE UP (ref 0–0.2)
BASOPHILS NFR BLD AUTO: 0.3 % — SIGNIFICANT CHANGE UP (ref 0–2)
BUN SERPL-MCNC: 34 MG/DL — HIGH (ref 7–23)
CALCIUM SERPL-MCNC: 9.5 MG/DL — SIGNIFICANT CHANGE UP (ref 8.4–10.5)
CHLORIDE SERPL-SCNC: 111 MMOL/L — HIGH (ref 96–108)
CO2 SERPL-SCNC: 22 MMOL/L — SIGNIFICANT CHANGE UP (ref 22–31)
CREAT SERPL-MCNC: 2.27 MG/DL — HIGH (ref 0.5–1.3)
CULTURE RESULTS: NO GROWTH — SIGNIFICANT CHANGE UP
EOSINOPHIL # BLD AUTO: 0.05 K/UL — SIGNIFICANT CHANGE UP (ref 0–0.5)
EOSINOPHIL NFR BLD AUTO: 0.3 % — SIGNIFICANT CHANGE UP (ref 0–6)
GLUCOSE SERPL-MCNC: 85 MG/DL — SIGNIFICANT CHANGE UP (ref 70–99)
HCT VFR BLD CALC: 33.6 % — LOW (ref 39–50)
HGB BLD-MCNC: 11 G/DL — LOW (ref 13–17)
IMM GRANULOCYTES NFR BLD AUTO: 0.6 % — SIGNIFICANT CHANGE UP (ref 0–1.5)
LACTATE SERPL-SCNC: 1.8 MMOL/L — SIGNIFICANT CHANGE UP (ref 0.7–2)
LYMPHOCYTES # BLD AUTO: 1.53 K/UL — SIGNIFICANT CHANGE UP (ref 1–3.3)
LYMPHOCYTES # BLD AUTO: 10.2 % — LOW (ref 13–44)
MCHC RBC-ENTMCNC: 32 PG — SIGNIFICANT CHANGE UP (ref 27–34)
MCHC RBC-ENTMCNC: 32.7 GM/DL — SIGNIFICANT CHANGE UP (ref 32–36)
MCV RBC AUTO: 97.7 FL — SIGNIFICANT CHANGE UP (ref 80–100)
MONOCYTES # BLD AUTO: 0.88 K/UL — SIGNIFICANT CHANGE UP (ref 0–0.9)
MONOCYTES NFR BLD AUTO: 5.9 % — SIGNIFICANT CHANGE UP (ref 2–14)
NEUTROPHILS # BLD AUTO: 12.39 K/UL — HIGH (ref 1.8–7.4)
NEUTROPHILS NFR BLD AUTO: 82.7 % — HIGH (ref 43–77)
NRBC # BLD: 0 /100 WBCS — SIGNIFICANT CHANGE UP (ref 0–0)
PLATELET # BLD AUTO: 151 K/UL — SIGNIFICANT CHANGE UP (ref 150–400)
POTASSIUM SERPL-MCNC: 4.2 MMOL/L — SIGNIFICANT CHANGE UP (ref 3.5–5.3)
POTASSIUM SERPL-SCNC: 4.2 MMOL/L — SIGNIFICANT CHANGE UP (ref 3.5–5.3)
RBC # BLD: 3.44 M/UL — LOW (ref 4.2–5.8)
RBC # FLD: 12.5 % — SIGNIFICANT CHANGE UP (ref 10.3–14.5)
SARS-COV-2 IGG SERPL QL IA: NEGATIVE — SIGNIFICANT CHANGE UP
SARS-COV-2 IGM SERPL IA-ACNC: 0.1 INDEX — SIGNIFICANT CHANGE UP
SODIUM SERPL-SCNC: 140 MMOL/L — SIGNIFICANT CHANGE UP (ref 135–145)
SPECIMEN SOURCE: SIGNIFICANT CHANGE UP
WBC # BLD: 14.98 K/UL — HIGH (ref 3.8–10.5)
WBC # FLD AUTO: 14.98 K/UL — HIGH (ref 3.8–10.5)

## 2020-10-25 PROCEDURE — 93005 ELECTROCARDIOGRAM TRACING: CPT

## 2020-10-25 PROCEDURE — 74176 CT ABD & PELVIS W/O CONTRAST: CPT

## 2020-10-25 PROCEDURE — 80053 COMPREHEN METABOLIC PANEL: CPT

## 2020-10-25 PROCEDURE — 36415 COLL VENOUS BLD VENIPUNCTURE: CPT

## 2020-10-25 PROCEDURE — 94640 AIRWAY INHALATION TREATMENT: CPT

## 2020-10-25 PROCEDURE — 87040 BLOOD CULTURE FOR BACTERIA: CPT

## 2020-10-25 PROCEDURE — 83605 ASSAY OF LACTIC ACID: CPT

## 2020-10-25 PROCEDURE — 85610 PROTHROMBIN TIME: CPT

## 2020-10-25 PROCEDURE — 87086 URINE CULTURE/COLONY COUNT: CPT

## 2020-10-25 PROCEDURE — 86769 SARS-COV-2 COVID-19 ANTIBODY: CPT

## 2020-10-25 PROCEDURE — 81001 URINALYSIS AUTO W/SCOPE: CPT

## 2020-10-25 PROCEDURE — U0003: CPT

## 2020-10-25 PROCEDURE — 99285 EMERGENCY DEPT VISIT HI MDM: CPT | Mod: 25

## 2020-10-25 PROCEDURE — P9047: CPT

## 2020-10-25 PROCEDURE — 96365 THER/PROPH/DIAG IV INF INIT: CPT | Mod: XU

## 2020-10-25 PROCEDURE — 71045 X-RAY EXAM CHEST 1 VIEW: CPT

## 2020-10-25 PROCEDURE — 51701 INSERT BLADDER CATHETER: CPT

## 2020-10-25 PROCEDURE — 80048 BASIC METABOLIC PNL TOTAL CA: CPT

## 2020-10-25 PROCEDURE — 96375 TX/PRO/DX INJ NEW DRUG ADDON: CPT | Mod: XU

## 2020-10-25 PROCEDURE — 85025 COMPLETE CBC W/AUTO DIFF WBC: CPT

## 2020-10-25 PROCEDURE — 99239 HOSP IP/OBS DSCHRG MGMT >30: CPT

## 2020-10-25 PROCEDURE — 85730 THROMBOPLASTIN TIME PARTIAL: CPT

## 2020-10-25 RX ORDER — AMPICILLIN SODIUM AND SULBACTAM SODIUM 250; 125 MG/ML; MG/ML
INJECTION, POWDER, FOR SUSPENSION INTRAMUSCULAR; INTRAVENOUS
Refills: 0 | Status: DISCONTINUED | OUTPATIENT
Start: 2020-10-25 | End: 2020-10-25

## 2020-10-25 RX ORDER — MIRTAZAPINE 45 MG/1
1 TABLET, ORALLY DISINTEGRATING ORAL
Qty: 0 | Refills: 0 | DISCHARGE

## 2020-10-25 RX ORDER — ATORVASTATIN CALCIUM 80 MG/1
1 TABLET, FILM COATED ORAL
Qty: 0 | Refills: 0 | DISCHARGE

## 2020-10-25 RX ORDER — MIRTAZAPINE 45 MG/1
1 TABLET, ORALLY DISINTEGRATING ORAL
Qty: 30 | Refills: 0
Start: 2020-10-25 | End: 2020-11-23

## 2020-10-25 RX ORDER — AMPICILLIN SODIUM AND SULBACTAM SODIUM 250; 125 MG/ML; MG/ML
1.5 INJECTION, POWDER, FOR SUSPENSION INTRAMUSCULAR; INTRAVENOUS ONCE
Refills: 0 | Status: COMPLETED | OUTPATIENT
Start: 2020-10-25 | End: 2020-10-25

## 2020-10-25 RX ADMIN — Medication 50 MILLIGRAM(S): at 05:43

## 2020-10-25 RX ADMIN — MEMANTINE HYDROCHLORIDE 10 MILLIGRAM(S): 10 TABLET ORAL at 05:43

## 2020-10-25 RX ADMIN — AMPICILLIN SODIUM AND SULBACTAM SODIUM 100 GRAM(S): 250; 125 INJECTION, POWDER, FOR SUSPENSION INTRAMUSCULAR; INTRAVENOUS at 10:57

## 2020-10-25 RX ADMIN — HEPARIN SODIUM 5000 UNIT(S): 5000 INJECTION INTRAVENOUS; SUBCUTANEOUS at 05:43

## 2020-10-25 NOTE — DISCHARGE NOTE PROVIDER - NSDCCPCAREPLAN_GEN_ALL_CORE_FT
PRINCIPAL DISCHARGE DIAGNOSIS  Diagnosis: Aspiration pneumonia of left lung, unspecified aspiration pneumonia type, unspecified part of lung  Assessment and Plan of Treatment:       SECONDARY DISCHARGE DIAGNOSES  Diagnosis: Non-intractable vomiting with nausea  Assessment and Plan of Treatment:     Diagnosis: EMILEE (acute kidney injury)  Assessment and Plan of Treatment:     Diagnosis: Dehydration  Assessment and Plan of Treatment:

## 2020-10-25 NOTE — DISCHARGE NOTE NURSING/CASE MANAGEMENT/SOCIAL WORK - PATIENT PORTAL LINK FT
You can access the FollowMyHealth Patient Portal offered by [facility] by registering at the following website: [email or web address]. By joining [employer]’s FollowMyHealth portal, you will also be able to view your health information using other applications (apps) compatible with our system.

## 2020-10-25 NOTE — PROGRESS NOTE ADULT - ASSESSMENT
This is a 89 y/o M with hx of advanced Alzheimer's dementia, Parkinsonism, CKD stage IV, HTN, presented to the ED accompanied by his daughter, Jose Thompson, c/o N/V, agitation, admitted for aspiration PNA.     #Aspiration PNA/Pneumonitis  -CT chest findings of Left sided opacity  -S/p Cefepime + Flagyl in the ED. GOC reviewed with daughter Jose Thompson  this morning who confirms she prefers patient to be discharged today with oral abx as he has 24h care  -Plan for Unasyn x1 today and transition to Augmentin x4  days, close PMD follow up.   -No concern for respiratory failure with hypoxia  -Monitor vitals/o2 sats  -AM labs pending    #Elevated Lactate Acid  -Secondary to Pneumonia + Dehydration  -s/p 250cc bolus LR followed by albumin 25% 100mL x1 overnight  -AM labs pending  -S/p IVF, albumin    #EMILEE on CKD Stage IV  -Prerenal etiology in the setting of N/V  -Baseline Cr 2.15-2.3  -S/p IVF  -Encourage PO intake - tolerated breakfast  -Continue to monitor    #HTN  -Chronic  -Continue Metoprolol  -Monitor vitals    #Advance dementia  -At baseline AAOx 0  -Encourage reorientation  -Plan for discharge home today.   -Continue Olanzapine, Memantine, Rivastigmine     #Advance care planning  -ARACELIS in chart; DNR/DNI/ No Tube feeds    #Polypharmacy  -Discussed with daughter dose reduction and discontinuation of medication in the elderly.  -Cont Mirtazapine 30mg qhs. statin dc'd This is a 89 y/o M with hx of advanced Alzheimer's dementia, Parkinsonism, CKD stage IV, HTN, presented to the ED accompanied by his daughter, Jose Thompson, c/o N/V, agitation, admitted for aspiration PNA.     #Aspiration PNA/Pneumonitis  -CT chest findings of Left sided opacity  -S/p Cefepime + Flagyl in the ED. GOC reviewed with daughter Jose Thompson  this morning who confirms she prefers patient to be discharged today with oral abx as he has 24h care at home.   -Plan for Unasyn x1 today and transition to Augmentin x4  days, close PMD follow up.   -No concern for respiratory failure with hypoxia  -Monitor vitals/o2 sats  -AM labs pending    #Elevated Lactate Acid  -Secondary to Pneumonia + Dehydration  -s/p 250cc bolus LR followed by albumin 25% 100mL x1 overnight  -AM labs pending  -S/p IVF, albumin    #EMILEE on CKD Stage IV  -Prerenal etiology in the setting of N/V  -Baseline Cr 2.15-2.3  -S/p IVF  -Encourage PO intake - tolerated breakfast  -Continue to monitor    #HTN  -Chronic  -Continue Metoprolol  -Monitor vitals    #Advance dementia  -At baseline AAOx 0  -Encourage reorientation  -Plan for discharge home today.   -Continue Olanzapine, Memantine, Rivastigmine     #Advance care planning  -ARACELIS in chart; DNR/DNI/ No Tube feeds    #Polypharmacy  -Cont Mirtazapine 30mg qhs. statin dc'd    Dispo - discharge home today This is a 89 y/o M with hx of advanced Alzheimer's dementia, Parkinsonism, CKD stage IV, HTN, presented to the ED accompanied by his daughter, Jose Thompson, c/o N/V, agitation, admitted for aspiration PNA.     #Aspiration PNA/Pneumonitis  -CT chest findings of Left sided opacity  -S/p Cefepime + Flagyl in the ED. GOC reviewed with daughter Jose Thompson  this morning who confirms she prefers patient to be discharged today with oral abx as he has 24h care at home.   -Plan for Unasyn x1 today and transition to Augmentin x4  days, close PMD follow up.   -No concern for respiratory failure with hypoxia  -Monitor vitals/o2 sats  -AM labs pending    #Elevated Lactate Acid  -Secondary to Pneumonia + Dehydration  -s/p 250cc bolus LR followed by albumin 25% 100mL x1 overnight  -AM labs pending  -S/p IVF, albumin    #EMILEE on CKD Stage IV  -Prerenal etiology in the setting of N/V  -Baseline Cr 2.15-2.3  -S/p IVF  -Encourage PO intake - tolerated breakfast  -Continue to monitor    #HTN  -Chronic  -Continue Metoprolol  -Monitor vitals    #Advance dementia  -At baseline AAOx 0  -Encourage reorientation  -Plan for discharge home today.   -Continue Olanzapine, Memantine, Rivastigmine     #Advance care planning  -ARACELIS in chart; DNR/DNI/ No Tube feeds    #Polypharmacy  -Cont Mirtazapine 30mg qhs. statin dc'd    Dispo - discharge home today

## 2020-10-25 NOTE — DISCHARGE NOTE PROVIDER - NSDCMRMEDTOKEN_GEN_ALL_CORE_FT
amoxicillin-clavulanate 875 mg-125 mg oral tablet: 1 tab(s) orally 2 times a day   citalopram 10 mg oral tablet: 1 tab(s) orally once a day  memantine 10 mg oral tablet: 1 tab(s) orally 2 times a day  metoprolol succinate 50 mg oral tablet, extended release: 1 tab(s) orally once a day  mirtazapine 30 mg oral tablet: 1 tab(s) orally once a day (at bedtime)  OLANZapine 5 mg oral tablet: 1 tab(s) orally once a day  rivastigmine 9.5 mg/24 hr transdermal film, extended release: 1 patch transdermal every 24 hours

## 2020-10-25 NOTE — DISCHARGE NOTE PROVIDER - CARE PROVIDER_API CALL
Flex Cutler  [facility]INE - [facility] - [address], [place] [address]  Phone: [phone]  Fax: [phone]  Follow Up Time:

## 2020-10-25 NOTE — DISCHARGE NOTE PROVIDER - HOSPITAL COURSE
Hospital Course  HPI:  89yo male with hx of advanced dementia due to Alzheimer's disease, Parkinsonism, CKD stage IV, HTN, presented to the ED accompanied by his daughter, Jose Thompson, with complaints of N/V x 2 this AM associated with agitation.   Pt is a poor historian. Hx provided by daughter. Per daughter, pt had vomited twice this AM while in supine position in bed. She states he was noted to have a mild productive cough afterwards. Denies difficulty with respiration, fever or chills.   Pt's wife past away 1 year ago from today, and was noted to call out his wife's name prior to the episode. Daughter states he speaks when he wants to and may or may not answer question. He has never had an episode of aspiration pneumonia in the past and eats regular food only softened/chopped.  (24 Oct 2020 12:59)      Source of Infection:  Antibiotic / Last Day:    Palliative Care / Advanced Care Planning  Code Status:  Patient/Family agreeable to Hospice/Palliative (Y/N)?  Summary of Goals of Care Conversation:    Discharging Provider:  Melo Jauregui MD  Contact Info: Cell [phone] - Please call with any questions or concerns.    Outpatient Provider:    Signout given to  SNF Provider:       Hospital Course  HPI:  This is a 89 y/o M with hx of advanced Alzheimer's dementia, Parkinsonism, CKD stage IV, HTN, presented to the ED accompanied by his daughter, Jose Thompson who provided the history as patient is a poor historian. Dtr endorses pt with N/V, agitation, mild productive cough x1 day. Denies SOB, change in respiration, fevers, or chills or hx of aspiration. States tolerates regular chopped/softened diet at home and has a good appetite. ED course significant for lactate 5.4, Tmax 99.1F,  /76, HR 74, WBC 11.88, BUN/Cr 36/2.96, CXR: LUBA, left base suspicious for multifocal PNA. CT AP significant for subplearual left basilar opacity, no acute pathology in abd/pelvis. UCx no growth. He was admitted to medical floors for aspiration pneumonia and EMILEE secondary to dehydration and received cefepime and flagyl x1. He received IVF and albumin with improvement in lactate and EMILEE. On 10/25/20 he received Unasyn x 1. Per goals of care discussed with daughter Jose, she prefers patient to be discharged on PO antibiotics, as patient has total care (24h HHA) at home. Patient remained afebrile and hemodynamically stable, tolerating PO without concern, for safe discharge home on augmentin to complete 5 days.     Hospitalization also significant for medication management/polypharmacy discussed with daughter on admission. Lipitor was discontinued as pt low risk for ACS. Remeron was decreased from 45mg qhs to 30mg qhs due to over-stimulation, possibly contributing to patient's symptoms of shaking. Recommend close follow up with PCP.     Source of Infection:  Antibiotic / Last Day: Augmentin end date 10/29/20    Code Status: DNR/DNI, no tube feeds    Discharging Provider: Nicole Ferreira DO  Contact Info: [phone] - Please call with any questions or concerns.    Outpatient Provider: Dr. Flex Cutler   Hospital Course  HPI:  This is a 89 y/o M with hx of advanced Alzheimer's dementia, Parkinsonism, CKD stage IV, HTN, presented to the ED accompanied by his daughter, Jose Thompson who provided the history as patient is a poor historian. Dtr endorses pt with N/V, agitation, mild productive cough x1 day. Denies SOB, change in respiration, fevers, or chills or hx of aspiration. States tolerates regular chopped/softened diet at home and has a good appetite. ED course significant for lactate 5.4, Tmax 99.1F,  /76, HR 74, WBC 11.88, BUN/Cr 36/2.96, CXR: LUBA, left base suspicious for multifocal PNA. CT AP significant for subplearual left basilar opacity, no acute pathology in abd/pelvis. UCx no growth. He was admitted to medical floors for aspiration pneumonia and EMILEE secondary to dehydration and received cefepime and flagyl x1. He received IVF and albumin with improvement in lactate and EMILEE. On 10/25/20 he received Unasyn x 1. Per goals of care discussed with daughter Jose, she prefers patient to be discharged on PO antibiotics, as patient has total care (24h HHA) at home. Patient remained afebrile and hemodynamically stable, tolerating PO without concern, for safe discharge home on augmentin to complete 5 days.     Hospitalization also significant for medication management/polypharmacy discussed with daughter on admission. Lipitor was discontinued as pt low risk for ACS. Remeron was decreased from 45mg qhs to 30mg qhs due to over-stimulation, possibly contributing to patient's symptoms of shaking. Recommend close follow up with PCP.     Source of Infection:  Antibiotic / Last Day: Augmentin end date 10/29/20    Code Status: DNR/DNI, no tube feeds    Discharging Provider: Nicole Ferreira DO  Contact Info: [phone] - Please call with any questions or concerns.    Outpatient Provider: Dr. Flex Cutler    Time Spent: 35 minutes

## 2020-10-25 NOTE — PROGRESS NOTE ADULT - SUBJECTIVE AND OBJECTIVE BOX
Patient is a 89y old  Male who presents with a chief complaint of Nausea vomiting (24 Oct 2020 12:59)      Patient seen and examined at bedside, awake, tolerating PO, denies pain. ROS limited due to dementia.     ALLERGIES:  No Known Allergies    MEDICATIONS  (STANDING):  ampicillin/sulbactam  IVPB 1.5 Gram(s) IV Intermittent once  escitalopram 5 milliGRAM(s) Oral daily  heparin   Injectable 5000 Unit(s) SubCutaneous every 8 hours  memantine 10 milliGRAM(s) Oral two times a day  metoprolol succinate ER 50 milliGRAM(s) Oral daily  mirtazapine 30 milliGRAM(s) Oral at bedtime  OLANZapine 5 milliGRAM(s) Oral <User Schedule>  rivastigmine patch  9.5 mG/24 Hr(s). 1 Patch Transdermal every 24 hours    MEDICATIONS  (PRN):    Vital Signs Last 24 Hrs  T(F): 99.1 (25 Oct 2020 05:41), Max: 99.1 (24 Oct 2020 16:09)  HR: 72 (25 Oct 2020 05:41) (70 - 83)  BP: 121/58 (25 Oct 2020 05:41) (121/58 - 182/76)  RR: 15 (25 Oct 2020 05:41) (15 - 23)  SpO2: 95% (25 Oct 2020 05:41) (92% - 98%)  I&O's Summary    24 Oct 2020 07:01  -  25 Oct 2020 07:00  --------------------------------------------------------  IN: 590 mL / OUT: 0 mL / NET: 590 mL      BMI (kg/m2): 9.2 (10-24-20 @ 16:09)  PHYSICAL EXAM:  General: NAD, A/Ox0, elderly  ENT: MMM, no scleral icterus  Neck: Supple, No JVD  Lungs: respirations non labored. diminished at bases. + diffuse expiratory wheezes  Cardio: RRR, S1/S2  Abdomen: Soft, Nontender, Nondistended; Bowel sounds present  Extremities: No calf tenderness, No pitting edema    LABS:                        11.0   14.98 )-----------( 151      ( 25 Oct 2020 08:50 )             33.6       10-24    141  |  106  |  36  ----------------------------<  155  3.8   |  24  |  2.96    Ca    10.1      24 Oct 2020 11:05    TPro  7.5  /  Alb  3.7  /  TBili  0.6  /  DBili  x   /  AST  22  /  ALT  21  /  AlkPhos  119  10-24     eGFR if Non African American: 18 mL/min/1.73M2 (10-24-20 @ 11:05)  eGFR if African American: 20 mL/min/1.73M2 (10-24-20 @ 11:05)    PT/INR - ( 24 Oct 2020 11:05 )   PT: 12.6 sec;   INR: 1.04 ratio         PTT - ( 24 Oct 2020 11:05 )  PTT:26.6 sec   Lactate, Blood: 4.3 mmol/L (10-24 @ 17:30)  Lactate, Blood: 3.3 mmol/L (10-24 @ 13:00)  Lactate, Blood: 5.4 mmol/L (10-24 @ 11:05)    Urinalysis Basic - ( 24 Oct 2020 11:40 )    Color: Yellow / Appearance: Clear / S.015 / pH: x  Gluc: x / Ketone: Negative  / Bili: Negative / Urobili: Negative   Blood: x / Protein: 30 mg/dL / Nitrite: Negative   Leuk Esterase: Negative / RBC: 0-4 /HPF / WBC Negative /HPF   Sq Epi: x / Non Sq Epi: Neg.-Few / Bacteria: Negative /HPF    RADIOLOGY & ADDITIONAL TESTS:  < from: CT Abdomen and Pelvis No Cont (10.24.20 @ 12:07) >      IMPRESSION:  Subpleural nonspecific left basilar airspace opacity. Infection is a differential consideration    No acute pathology in the abdomen/pelvis. Specifically, no evidence of bowel obstruction as questioned.    < end of copied text >  < from: Xray Chest 1 View AP/PA (10.24.20 @ 12:06) >    IMPRESSION: Airspace opacity seen in the left upper lobe and left base suspicious for multifocal pneumonia.    < end of copied text >      Care Discussed with Consultants/Other Providers: yes

## 2020-11-01 ENCOUNTER — RX RENEWAL (OUTPATIENT)
Age: 85
End: 2020-11-01

## 2020-11-03 ENCOUNTER — APPOINTMENT (OUTPATIENT)
Dept: FAMILY MEDICINE | Facility: HOME HEALTH | Age: 85
End: 2020-11-03
Payer: MEDICARE

## 2020-11-03 PROCEDURE — 99495 TRANSJ CARE MGMT MOD F2F 14D: CPT

## 2020-11-09 NOTE — ED ADULT NURSE REASSESSMENT NOTE - NS ED NURSE REASSESS COMMENT FT1
Dr. Castro discussed results with pts daughter. Daughter maintains she wants to take him home. Ambulation trial done at bedside with Dr. Castro present, pt has shuffling gait & this is baseline according to pts daughter. Daughter is comfortable taking pt home with oral antibiotics. Pt vitals stable, safety maintained & will continue to monitor. none

## 2020-12-15 ENCOUNTER — RX RENEWAL (OUTPATIENT)
Age: 85
End: 2020-12-15

## 2021-01-04 ENCOUNTER — INPATIENT (INPATIENT)
Facility: HOSPITAL | Age: 86
LOS: 5 days | Discharge: HOME | DRG: 871 | End: 2021-01-10
Attending: HOSPITALIST | Admitting: STUDENT IN AN ORGANIZED HEALTH CARE EDUCATION/TRAINING PROGRAM
Payer: COMMERCIAL

## 2021-01-04 VITALS
HEIGHT: 67 IN | DIASTOLIC BLOOD PRESSURE: 81 MMHG | TEMPERATURE: 99 F | HEART RATE: 89 BPM | WEIGHT: 199.96 LBS | OXYGEN SATURATION: 98 % | RESPIRATION RATE: 22 BRPM | SYSTOLIC BLOOD PRESSURE: 119 MMHG

## 2021-01-04 DIAGNOSIS — Z98.890 OTHER SPECIFIED POSTPROCEDURAL STATES: Chronic | ICD-10-CM

## 2021-01-04 DIAGNOSIS — J69.0 PNEUMONITIS DUE TO INHALATION OF FOOD AND VOMIT: ICD-10-CM

## 2021-01-04 DIAGNOSIS — Z90.49 ACQUIRED ABSENCE OF OTHER SPECIFIED PARTS OF DIGESTIVE TRACT: Chronic | ICD-10-CM

## 2021-01-04 LAB
ALBUMIN SERPL ELPH-MCNC: 3.9 G/DL — SIGNIFICANT CHANGE UP (ref 3.3–5)
ALP SERPL-CCNC: 140 U/L — HIGH (ref 40–120)
ALT FLD-CCNC: 22 U/L — SIGNIFICANT CHANGE UP (ref 10–45)
ANION GAP SERPL CALC-SCNC: 20 MMOL/L — HIGH (ref 5–17)
APTT BLD: 23.9 SEC — LOW (ref 27.5–35.5)
AST SERPL-CCNC: 27 U/L — SIGNIFICANT CHANGE UP (ref 10–40)
BASOPHILS # BLD AUTO: 0.04 K/UL — SIGNIFICANT CHANGE UP (ref 0–0.2)
BASOPHILS NFR BLD AUTO: 0.3 % — SIGNIFICANT CHANGE UP (ref 0–2)
BILIRUB SERPL-MCNC: 0.6 MG/DL — SIGNIFICANT CHANGE UP (ref 0.2–1.2)
BUN SERPL-MCNC: 28 MG/DL — HIGH (ref 7–23)
CALCIUM SERPL-MCNC: 10.5 MG/DL — SIGNIFICANT CHANGE UP (ref 8.4–10.5)
CHLORIDE SERPL-SCNC: 104 MMOL/L — SIGNIFICANT CHANGE UP (ref 96–108)
CO2 BLDA-SCNC: 14 MMOL/L — LOW (ref 22–30)
CO2 SERPL-SCNC: 18 MMOL/L — LOW (ref 22–31)
CREAT SERPL-MCNC: 2.83 MG/DL — HIGH (ref 0.5–1.3)
EOSINOPHIL # BLD AUTO: 0.13 K/UL — SIGNIFICANT CHANGE UP (ref 0–0.5)
EOSINOPHIL NFR BLD AUTO: 1 % — SIGNIFICANT CHANGE UP (ref 0–6)
GAS PNL BLDA: SIGNIFICANT CHANGE UP
GLUCOSE SERPL-MCNC: 179 MG/DL — HIGH (ref 70–99)
HCT VFR BLD CALC: 47.6 % — SIGNIFICANT CHANGE UP (ref 39–50)
HGB BLD-MCNC: 14.8 G/DL — SIGNIFICANT CHANGE UP (ref 13–17)
HOROWITZ INDEX BLDA+IHG-RTO: SIGNIFICANT CHANGE UP
IMM GRANULOCYTES NFR BLD AUTO: 0.3 % — SIGNIFICANT CHANGE UP (ref 0–1.5)
INR BLD: 1.1 RATIO — SIGNIFICANT CHANGE UP (ref 0.88–1.16)
LACTATE SERPL-SCNC: 2.6 MMOL/L — HIGH (ref 0.7–2)
LACTATE SERPL-SCNC: 5.3 MMOL/L — CRITICAL HIGH (ref 0.7–2)
LACTATE SERPL-SCNC: 9.1 MMOL/L — CRITICAL HIGH (ref 0.7–2)
LYMPHOCYTES # BLD AUTO: 0.57 K/UL — LOW (ref 1–3.3)
LYMPHOCYTES # BLD AUTO: 4.3 % — LOW (ref 13–44)
MCHC RBC-ENTMCNC: 30.3 PG — SIGNIFICANT CHANGE UP (ref 27–34)
MCHC RBC-ENTMCNC: 31.1 GM/DL — LOW (ref 32–36)
MCV RBC AUTO: 97.5 FL — SIGNIFICANT CHANGE UP (ref 80–100)
MONOCYTES # BLD AUTO: 0.17 K/UL — SIGNIFICANT CHANGE UP (ref 0–0.9)
MONOCYTES NFR BLD AUTO: 1.3 % — LOW (ref 2–14)
NEUTROPHILS # BLD AUTO: 12.29 K/UL — HIGH (ref 1.8–7.4)
NEUTROPHILS NFR BLD AUTO: 92.8 % — HIGH (ref 43–77)
NRBC # BLD: 0 /100 WBCS — SIGNIFICANT CHANGE UP (ref 0–0)
PCO2 BLDA: 28 MMHG — LOW (ref 32–46)
PH BLDA: 7.28 — LOW (ref 7.35–7.45)
PLATELET # BLD AUTO: 246 K/UL — SIGNIFICANT CHANGE UP (ref 150–400)
PO2 BLDA: 67 MMHG — LOW (ref 74–108)
POTASSIUM SERPL-MCNC: 3.6 MMOL/L — SIGNIFICANT CHANGE UP (ref 3.5–5.3)
POTASSIUM SERPL-SCNC: 3.6 MMOL/L — SIGNIFICANT CHANGE UP (ref 3.5–5.3)
PROT SERPL-MCNC: 8.2 G/DL — SIGNIFICANT CHANGE UP (ref 6–8.3)
PROTHROM AB SERPL-ACNC: 13.3 SEC — SIGNIFICANT CHANGE UP (ref 10.6–13.6)
RBC # BLD: 4.88 M/UL — SIGNIFICANT CHANGE UP (ref 4.2–5.8)
RBC # FLD: 12.8 % — SIGNIFICANT CHANGE UP (ref 10.3–14.5)
SAO2 % BLDA: 90 % — LOW (ref 92–96)
SARS-COV-2 RNA SPEC QL NAA+PROBE: SIGNIFICANT CHANGE UP
SODIUM SERPL-SCNC: 142 MMOL/L — SIGNIFICANT CHANGE UP (ref 135–145)
WBC # BLD: 13.24 K/UL — HIGH (ref 3.8–10.5)
WBC # FLD AUTO: 13.24 K/UL — HIGH (ref 3.8–10.5)

## 2021-01-04 PROCEDURE — 71045 X-RAY EXAM CHEST 1 VIEW: CPT | Mod: 26

## 2021-01-04 PROCEDURE — 99285 EMERGENCY DEPT VISIT HI MDM: CPT

## 2021-01-04 PROCEDURE — 93010 ELECTROCARDIOGRAM REPORT: CPT

## 2021-01-04 RX ORDER — OLANZAPINE 15 MG/1
1 TABLET, FILM COATED ORAL
Qty: 0 | Refills: 0 | DISCHARGE

## 2021-01-04 RX ORDER — MEMANTINE HYDROCHLORIDE 10 MG/1
10 TABLET ORAL DAILY
Refills: 0 | Status: DISCONTINUED | OUTPATIENT
Start: 2021-01-04 | End: 2021-01-10

## 2021-01-04 RX ORDER — RIVASTIGMINE 4.6 MG/24H
1 PATCH, EXTENDED RELEASE TRANSDERMAL EVERY 24 HOURS
Refills: 0 | Status: DISCONTINUED | OUTPATIENT
Start: 2021-01-04 | End: 2021-01-10

## 2021-01-04 RX ORDER — VANCOMYCIN HCL 1 G
1000 VIAL (EA) INTRAVENOUS ONCE
Refills: 0 | Status: COMPLETED | OUTPATIENT
Start: 2021-01-04 | End: 2021-01-04

## 2021-01-04 RX ORDER — SODIUM CHLORIDE 9 MG/ML
2000 INJECTION INTRAMUSCULAR; INTRAVENOUS; SUBCUTANEOUS ONCE
Refills: 0 | Status: COMPLETED | OUTPATIENT
Start: 2021-01-04 | End: 2021-01-04

## 2021-01-04 RX ORDER — MIRTAZAPINE 45 MG/1
30 TABLET, ORALLY DISINTEGRATING ORAL DAILY
Refills: 0 | Status: DISCONTINUED | OUTPATIENT
Start: 2021-01-04 | End: 2021-01-10

## 2021-01-04 RX ORDER — CEFTRIAXONE 500 MG/1
1000 INJECTION, POWDER, FOR SOLUTION INTRAMUSCULAR; INTRAVENOUS ONCE
Refills: 0 | Status: COMPLETED | OUTPATIENT
Start: 2021-01-04 | End: 2021-01-04

## 2021-01-04 RX ORDER — SODIUM CHLORIDE 9 MG/ML
1000 INJECTION, SOLUTION INTRAVENOUS
Refills: 0 | Status: DISCONTINUED | OUTPATIENT
Start: 2021-01-04 | End: 2021-01-08

## 2021-01-04 RX ORDER — CITALOPRAM 10 MG/1
10 TABLET, FILM COATED ORAL DAILY
Refills: 0 | Status: DISCONTINUED | OUTPATIENT
Start: 2021-01-04 | End: 2021-01-04

## 2021-01-04 RX ORDER — METRONIDAZOLE 500 MG
500 TABLET ORAL EVERY 8 HOURS
Refills: 0 | Status: DISCONTINUED | OUTPATIENT
Start: 2021-01-04 | End: 2021-01-07

## 2021-01-04 RX ORDER — METOPROLOL TARTRATE 50 MG
1 TABLET ORAL
Qty: 0 | Refills: 0 | DISCHARGE

## 2021-01-04 RX ORDER — CITALOPRAM 10 MG/1
1 TABLET, FILM COATED ORAL
Qty: 0 | Refills: 0 | DISCHARGE

## 2021-01-04 RX ORDER — ESCITALOPRAM OXALATE 10 MG/1
5 TABLET, FILM COATED ORAL DAILY
Refills: 0 | Status: DISCONTINUED | OUTPATIENT
Start: 2021-01-04 | End: 2021-01-10

## 2021-01-04 RX ORDER — CEFEPIME 1 G/1
2000 INJECTION, POWDER, FOR SOLUTION INTRAMUSCULAR; INTRAVENOUS ONCE
Refills: 0 | Status: COMPLETED | OUTPATIENT
Start: 2021-01-04 | End: 2021-01-04

## 2021-01-04 RX ORDER — HEPARIN SODIUM 5000 [USP'U]/ML
5000 INJECTION INTRAVENOUS; SUBCUTANEOUS EVERY 12 HOURS
Refills: 0 | Status: DISCONTINUED | OUTPATIENT
Start: 2021-01-04 | End: 2021-01-10

## 2021-01-04 RX ORDER — MEMANTINE HYDROCHLORIDE 10 MG/1
1 TABLET ORAL
Qty: 0 | Refills: 0 | DISCHARGE

## 2021-01-04 RX ORDER — OLANZAPINE 15 MG/1
5 TABLET, FILM COATED ORAL DAILY
Refills: 0 | Status: DISCONTINUED | OUTPATIENT
Start: 2021-01-04 | End: 2021-01-10

## 2021-01-04 RX ADMIN — SODIUM CHLORIDE 2000 MILLILITER(S): 9 INJECTION INTRAMUSCULAR; INTRAVENOUS; SUBCUTANEOUS at 13:10

## 2021-01-04 RX ADMIN — ESCITALOPRAM OXALATE 5 MILLIGRAM(S): 10 TABLET, FILM COATED ORAL at 18:23

## 2021-01-04 RX ADMIN — RIVASTIGMINE 1 PATCH: 4.6 PATCH, EXTENDED RELEASE TRANSDERMAL at 22:01

## 2021-01-04 RX ADMIN — CEFTRIAXONE 100 MILLIGRAM(S): 500 INJECTION, POWDER, FOR SOLUTION INTRAMUSCULAR; INTRAVENOUS at 22:01

## 2021-01-04 RX ADMIN — Medication 100 MILLIGRAM(S): at 22:01

## 2021-01-04 RX ADMIN — SODIUM CHLORIDE 2000 MILLILITER(S): 9 INJECTION INTRAMUSCULAR; INTRAVENOUS; SUBCUTANEOUS at 12:10

## 2021-01-04 RX ADMIN — HEPARIN SODIUM 5000 UNIT(S): 5000 INJECTION INTRAVENOUS; SUBCUTANEOUS at 17:32

## 2021-01-04 RX ADMIN — Medication 250 MILLIGRAM(S): at 12:45

## 2021-01-04 RX ADMIN — Medication 1000 MILLIGRAM(S): at 13:45

## 2021-01-04 RX ADMIN — CEFEPIME 100 MILLIGRAM(S): 1 INJECTION, POWDER, FOR SOLUTION INTRAMUSCULAR; INTRAVENOUS at 12:11

## 2021-01-04 RX ADMIN — Medication 100 MILLIGRAM(S): at 17:31

## 2021-01-04 RX ADMIN — SODIUM CHLORIDE 100 MILLILITER(S): 9 INJECTION, SOLUTION INTRAVENOUS at 17:44

## 2021-01-04 RX ADMIN — SODIUM CHLORIDE 100 MILLILITER(S): 9 INJECTION, SOLUTION INTRAVENOUS at 22:02

## 2021-01-04 RX ADMIN — CEFEPIME 2000 MILLIGRAM(S): 1 INJECTION, POWDER, FOR SOLUTION INTRAMUSCULAR; INTRAVENOUS at 12:41

## 2021-01-04 NOTE — PATIENT PROFILE ADULT - NSPROPTRIGHTNOTIFY_GEN_A_NUR
Celestina Radford  1946    Patient seen [facility] for follow-up check of her INR. Patient has been taking warfarin 7 mg daily (49 mg/week) since 04/25/20. Patient restarted HCTZ, but hasn't yet restarted Crestor.  She is waiting to talk to her doctor before restarting it and wants to see if there is an alternative medication she can take instead.  Patient says she is trying to cut down on her gabapentin usage and she hasn't taken any gabapentin for the past couple days.  She denies any other changes in her medication, including OTC and herbal medications.  Patient reports eating more green vegetables than usual over the past couple weeks, including brussel sprouts, cabbage and salad.  She denies any other changes in her diet and she denies consuming any green tea, herbal tea, protein shakes, nutritional supplements, V8 juice or pistachios.  Patient denies any significant changes in alcohol use.  She denies having any vomiting, diarrhea, fever, or other illness lately.  Patient thinks she may have taken two 7 mg doses of warfarin one day about 3 weeks ago, so she took only 5 mg of warfarin the next day.  She denies missing any doses of warfarin completely or taking any other extra doses.  Patient continues to sometimes see a little blood on the toilet paper due to her hemorrhoids, and this usually occurs after she goes out for a walk.  Patient attributes the bleeding to irritation of her hemorrhoids from the walking.  She denies the stools ever contain blood or appear black or tarry.  Patient says she had a bruise on her leg from bumping into a table, but the bruise is healing.  She denies experiencing any other symptoms of bleeding or having any symptoms of thrombosis.     INR - 1.8 today by fingerstick  (Goal INR = 2.0-3.0 for Pulmonary Embolism)    INR subtherapeutic today and level should be at steady state again after patient possibly took an extra dose of warfarin, then a lower  dose the next day, about 3 weeks ago.  Would not expect those changes in dose to still be impacting INR significantly.  Patient's INR decreased since last visit with no change in warfarin dose.  Increased Vitamin K intake likely contributing to the drop in level.  Would not expect the use of HCTZ nor the reduction of gabapentin to affect INR.  Before today, patient's INR had been therapeutic on a warfarin dose of 49 mg/week for 3 months, so I don't want to change warfarin dose at this time.  In addition, patient plans to cut down on her green vegetable consumption, which may increase the INR.  Instructed patient to take 8 mg of warfarin tonight, then continue current warfarin dose of 7 mg daily (49 mg/week).  Recheck INR in 2 weeks.  If level is still low at that time, a more permanent adjustment in warfarin dose will be considered.    Start Time - 10:26 am     End Time - 10:41 am  Education Time - 4 minutes    Date INR Previous Warfarin dose New Warfarin Dose Comments   04/24/20 4.0 50 mg/week Hold on 4/24/20, then 49 mg/week Keflex 4/22/20 - 5/2/20, UTI, decreased Vitamin K   05/06/20 2.3 49 mg/week 49 mg/week Dose held on 4/24/20 05/27/20 2.1 49 mg/week 49 mg/week    06/24/20 2.1 49 mg/week 49 mg/week    07/22/20 2.4 49 mg/week 49 mg/week    08/19/20 1.8 49 mg/week Take 8 mg on 8/19/20, then 49 mg/week Increased Vitamin K          Current Outpatient Medications   Medication Sig Dispense Refill   • traMADol (ULTRAM) 50 MG tablet Take 50 mg by mouth 2 times daily as needed for Pain.     • gabapentin (NEURONTIN) 100 MG capsule Take 100 mg by mouth 3 times daily.     • mupirocin (BACTROBAN) 2 % ointment Apply topically 3 times daily. 22 g 0   • metoCLOPramide (REGLAN) 10 MG tablet Take 1 tablet by mouth 4 times daily for 3 days. Take with Benadryl diphenhydramine) 25 mg if side effects of jitteriness occur 12 tablet 0   • LORazepam (ATIVAN) 1 MG tablet Take 1 tablet by mouth 1 time as needed (Prior to MRI). 1  information could not be obtained tablet 0   • hydrochlorothiazide (HYDRODIURIL) 12.5 MG tablet Take 12.5 mg by mouth daily.     • levothyroxine (SYNTHROID, LEVOTHROID) 125 MCG tablet Take 125 mcg by mouth daily.     • magnesium chloride (MAG64) 64 MG Tablet Enteric Coated deleayed release tablet Take 64 mg by mouth as needed.     • rosuvastatin (CRESTOR) 5 MG tablet Take 5 mg by mouth daily.     • warfarin (COUMADIN) 2 MG tablet Take as directed     • Cholecalciferol (VITAMIN D3 GUMMIES ADULT PO) Take by mouth daily.       No current facility-administered medications for this visit.

## 2021-01-04 NOTE — GOALS OF CARE CONVERSATION - ADVANCED CARE PLANNING - CONVERSATION DETAILS
Pt. in [facility] ED with hypoxia, from home. Pt. has hx. dementia and Parkinsons disease. Printed out old MOLST DNR/I, limited medical intervention, allow trial of IV fluids, use abx., send to hospital. Called [facility] and confirmed these continue to be their wishes. Copy of MOLST done in January 2019 placed in chart. Dr. Michel and RN made aware.

## 2021-01-04 NOTE — H&P ADULT - NSHPPHYSICALEXAM_GEN_ALL_CORE
T(C): 37.2 (01-04-21 @ 11:00), Max: 37.2 (01-04-21 @ 11:00)  HR: 91 (01-04-21 @ 12:05) (89 - 91)  BP: 133/77 (01-04-21 @ 12:05) (119/81 - 133/77)  RR: 24 (01-04-21 @ 12:05) (22 - 24)  SpO2: 94% (01-04-21 @ 12:05) (94% - 98%)  Wt(kg): --Vital Signs Last 24 Hrs  T(C): 37.2 (04 Jan 2021 11:00), Max: 37.2 (04 Jan 2021 11:00)  T(F): 99 (04 Jan 2021 11:00), Max: 99 (04 Jan 2021 11:00)  HR: 91 (04 Jan 2021 12:05) (89 - 91)  BP: 133/77 (04 Jan 2021 12:05) (119/81 - 133/77)  BP(mean): 84 (04 Jan 2021 12:05) (84 - 84)  RR: 24 (04 Jan 2021 12:05) (22 - 24)  SpO2: 94% (04 Jan 2021 12:05) (94% - 98%)    PHYSICAL EXAM:  GENERAL: Elderly M lying in stretcher, on NRB  NERVOUS SYSTEM:  Alert & Oriented X0. Not following commands  HEAD:  Atraumatic, Normocephalic  EYES: EOMI, PERRLA, conjunctiva and sclera clear  ENMT: No tonsillar erythema, exudates, or enlargement; Moist mucous membranes, Good dentition, No lesions  NECK: Supple, No JVD, Normal thyroid  CHEST/LUNG: Clear to percussion bilaterally; No rales, rhonchi, wheezing, or rubs  HEART: Rhonchi at right base.   ABDOMEN: Soft, Nontender, Nondistended; Bowel sounds present  EXTREMITIES:  2+ Peripheral Pulses, No clubbing, cyanosis, or edema  LYMPH: No lymphadenopathy noted  SKIN: No rashes or lesions

## 2021-01-04 NOTE — ED PROVIDER NOTE - PHYSICAL EXAMINATION
General:     ill appearing  Eyes: PERRL  Head:     dry oral mucosa  Neck:     trachea midline  Lungs:     rhonchi b/l R>L, tachypnea   CVS:     tachycardia  Abd:     +BS, s/nt/nd  Ext:   extremely tremulous   Neuro: awake, not oriented, does not follow commands

## 2021-01-04 NOTE — H&P ADULT - ASSESSMENT
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    # Severe sepsis likely due to Aspiration Pneumonia  # Lactic acidosis due to above  - s/p Vanc, Cefepime, sepsis fluid in ER  - IVF  - repeat lactate. Not a candidate for ICU  - c/w oxygen supplementation  - Broad coverage with Ceftriaxone and Clindamycin  - DNR/DNI    # Advanced Dementia  - baseline A/Ox0  - Continue with home meds    # Hypertension   - Hold anti-hypertensives in setting of sepsis    # Chronic Kidney Disease 4  - monitor    # DVT Prophylaxis:  - Heparin    Daughter Jose Thompson [phone]    Admission Orders:  Vitals q8h  Diet: NPO for now  Activity: Bedrest for now  Precautions: Aspiration, Fall  Code status: DNR/DNI    IMPROVE VTE Individual Risk Assessment          RISK                                                          Points  [  ] Previous VTE                                                3  [  ] Thrombophilia                                             2  [  ] Lower limb paralysis                                   2        (unable to hold up >15 seconds)    [  ] Current Cancer                                             2         (within 6 months)  [ X ] Immobilization > 24 hrs                              1  [  ] ICU/CCU stay > 24 hours                             1  [ X ] Age > 60                                                         1    IMPROVE VTE Score:         [    2     ]   89y year old Male with Hx of 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    # Severe sepsis likely due to Aspiration Pneumonia  # Lactic acidosis due to above  - s/p Vanc, Cefepime, sepsis fluid in ER  - IVF  - repeat lactate. Not a candidate for ICU  - c/w oxygen supplementation  - Broad coverage with Ceftriaxone and Flagyl  - DNR/DNI    # Advanced Dementia  - baseline A/Ox0  - Continue with home meds    # Hypertension   - Hold anti-hypertensives in setting of sepsis    # Chronic Kidney Disease 4  - monitor    # DVT Prophylaxis:  - Heparin    Daughter Jose Thompson [phone]    Admission Orders:  Vitals q8h  Diet: NPO for now  Activity: Bedrest for now  Precautions: Aspiration, Fall  Code status: DNR/DNI    IMPROVE VTE Individual Risk Assessment          RISK                                                          Points  [  ] Previous VTE                                                3  [  ] Thrombophilia                                             2  [  ] Lower limb paralysis                                   2        (unable to hold up >15 seconds)    [  ] Current Cancer                                             2         (within 6 months)  [ X ] Immobilization > 24 hrs                              1  [  ] ICU/CCU stay > 24 hours                             1  [ X ] Age > 60                                                         1    IMPROVE VTE Score:         [    2     ]   89y year old Male with Hx of 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    # Severe sepsis likely due to Aspiration Pneumonia  # Acute hypoxic respiratory failure  # Lactic acidosis    - s/p Vanc, Cefepime, sepsis fluid in ER  - IVF  - repeat lactate. Not a candidate for ICU  - c/w oxygen supplementation  - Broad coverage with Ceftriaxone and Flagyl  - DNR/DNI    # Advanced Dementia  - baseline A/Ox0  - Continue with home meds    # Hypertension   - Hold anti-hypertensives in setting of sepsis    # Chronic Kidney Disease 4  - monitor    # DVT Prophylaxis:  - Heparin    Updated daughter Jose Thompson [phone] on severity of illness, respiratory failure requiring high level of oxygen. She confirms patient's DNR status.     Admission Orders:  Vitals q8h  Diet: NPO for now  Activity: Bedrest for now  Precautions: Aspiration, Fall  Code status: DNR/DNI    IMPROVE VTE Individual Risk Assessment          RISK                                                          Points  [  ] Previous VTE                                                3  [  ] Thrombophilia                                             2  [ X ] Lower limb paralysis                                   2        (unable to hold up >15 seconds)    [  ] Current Cancer                                             2         (within 6 months)  [ X ] Immobilization > 24 hrs                              1  [  ] ICU/CCU stay > 24 hours                             1  [ X ] Age > 60                                                         1    IMPROVE VTE Score:         [    4     ]

## 2021-01-04 NOTE — H&P ADULT - HISTORY OF PRESENT ILLNESS
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    Patient unable to give meaningful history due to advanced dementia. History obtained thru ER Physician and daughter Jose [phone] who reports patient had been more fatigue since last night, had an episode of vomiting this morning and difficulty breathing which prompted her to seek medical attention.    Per EMS patient saturating in 70s at home. On ER arrival, placed on 15L NRB with improvement of saturation to mid 90s. Labs remarkable for elevated lactate 9.1

## 2021-01-04 NOTE — ED ADULT TRIAGE NOTE - CHIEF COMPLAINT QUOTE
As per EMS pt with O2 sat in 70's at home, 85 on oxygen. Daughter also reports pt had abd pain yesterday and vomited today

## 2021-01-04 NOTE — ED PROVIDER NOTE - CLINICAL SUMMARY MEDICAL DECISION MAKING FREE TEXT BOX
89y year old Male with Hx of Alzheimer's, Parkinson's Disease, ckd, htn who is BIBEMS for hypoxia to 70s at home. pts daughter Jose [phone] states yesterday pt was eating and vomited and started having gurgling. now increasing fatigue since last night, had an episode of vomiting this morning and difficulty breathing sating 70. on arrival to ed sating 85 on NC. sepsis work up initiated., concern for aspiration pna   improved on 15L NRB. contacted icu as per hospitalist and since pt is dnr/dni ICU consult not indicated. will admitted to medicine

## 2021-01-04 NOTE — ED PROVIDER NOTE - OBJECTIVE STATEMENT
89y year old Male with Hx of Alzheimer's, Parkinson's Disease, ckd, htn who is BIBEMS for hypoxia to 70s at home. pts daughter Jose [phone] states yesterday pt was eating and vomited and started having gurgling. now increasing fatigue since last night, had an episode of vomiting this morning and difficulty breathing sating 70. on arrival to ed sating 85 on NC

## 2021-01-04 NOTE — ED PROVIDER NOTE - CARE PLAN
Principal Discharge DX:	Aspiration pneumonia of right lower lobe, unspecified aspiration pneumonia type

## 2021-01-04 NOTE — ED PROVIDER NOTE - ATTENDING CONTRIBUTION TO CARE
89y year old Male with Hx of Alzheimer's, Parkinson's Disease, ckd, htn who is BIBEMS for hypoxia to 70s at home. pts daughter Jose [phone] states yesterday pt was eating and vomited and started having gurgling. now increasing fatigue since last night, had an episode of vomiting this morning and difficulty breathing sating 70. on arrival to ed sating 85 on NC. sepsis work up initiated., concern for aspiration pna   improved on 15L NRB. contacted icu as per hospitalist and since pt is dnr/dni ICU consult not indicated. will admitted to medicine  Dr. Michel:  I have reviewed and discussed with the PA/ resident the case specifics, including the history, physical assessment, evaluation, conclusion, laboratory results, and medical plan. I agree with the contents, and conclusions. I have personally examined, and interviewed the patient.

## 2021-01-05 LAB
SARS-COV-2 IGG SERPL QL IA: NEGATIVE — SIGNIFICANT CHANGE UP
SARS-COV-2 IGM SERPL IA-ACNC: 0.08 INDEX — SIGNIFICANT CHANGE UP

## 2021-01-05 PROCEDURE — 99233 SBSQ HOSP IP/OBS HIGH 50: CPT

## 2021-01-05 RX ORDER — OLANZAPINE 15 MG/1
2.5 TABLET, FILM COATED ORAL DAILY
Refills: 0 | Status: DISCONTINUED | OUTPATIENT
Start: 2021-01-05 | End: 2021-01-10

## 2021-01-05 RX ORDER — ACETAMINOPHEN 500 MG
650 TABLET ORAL EVERY 6 HOURS
Refills: 0 | Status: DISCONTINUED | OUTPATIENT
Start: 2021-01-05 | End: 2021-01-10

## 2021-01-05 RX ORDER — CEFTRIAXONE 500 MG/1
1000 INJECTION, POWDER, FOR SOLUTION INTRAMUSCULAR; INTRAVENOUS EVERY 24 HOURS
Refills: 0 | Status: DISCONTINUED | OUTPATIENT
Start: 2021-01-05 | End: 2021-01-07

## 2021-01-05 RX ORDER — ACETAMINOPHEN 500 MG
650 TABLET ORAL EVERY 6 HOURS
Refills: 0 | Status: DISCONTINUED | OUTPATIENT
Start: 2021-01-05 | End: 2021-01-05

## 2021-01-05 RX ADMIN — Medication 650 MILLIGRAM(S): at 02:18

## 2021-01-05 RX ADMIN — RIVASTIGMINE 1 PATCH: 4.6 PATCH, EXTENDED RELEASE TRANSDERMAL at 22:54

## 2021-01-05 RX ADMIN — HEPARIN SODIUM 5000 UNIT(S): 5000 INJECTION INTRAVENOUS; SUBCUTANEOUS at 17:24

## 2021-01-05 RX ADMIN — Medication 650 MILLIGRAM(S): at 03:18

## 2021-01-05 RX ADMIN — RIVASTIGMINE 1 PATCH: 4.6 PATCH, EXTENDED RELEASE TRANSDERMAL at 17:25

## 2021-01-05 RX ADMIN — Medication 100 MILLIGRAM(S): at 22:54

## 2021-01-05 RX ADMIN — RIVASTIGMINE 1 PATCH: 4.6 PATCH, EXTENDED RELEASE TRANSDERMAL at 07:36

## 2021-01-05 RX ADMIN — OLANZAPINE 2.5 MILLIGRAM(S): 15 TABLET, FILM COATED ORAL at 16:13

## 2021-01-05 RX ADMIN — HEPARIN SODIUM 5000 UNIT(S): 5000 INJECTION INTRAVENOUS; SUBCUTANEOUS at 06:22

## 2021-01-05 RX ADMIN — Medication 100 MILLIGRAM(S): at 06:22

## 2021-01-05 RX ADMIN — RIVASTIGMINE 1 PATCH: 4.6 PATCH, EXTENDED RELEASE TRANSDERMAL at 22:53

## 2021-01-05 RX ADMIN — CEFTRIAXONE 100 MILLIGRAM(S): 500 INJECTION, POWDER, FOR SOLUTION INTRAMUSCULAR; INTRAVENOUS at 16:36

## 2021-01-05 RX ADMIN — SODIUM CHLORIDE 100 MILLILITER(S): 9 INJECTION, SOLUTION INTRAVENOUS at 22:54

## 2021-01-05 NOTE — SWALLOW BEDSIDE ASSESSMENT ADULT - SWALLOW EVAL: DIAGNOSIS
Unable to ascertain presence/absence of dysphagia at this time due to limited acceptance of PO trials. Suspect oral dysphagia superimposed by edentulous lower dentition with history of pureed solids. Patient accepted trials of ice chips only, refusing all attempts for trials of thin liquids and puree solids. Adequate acceptance of ice chips, suspected latent AP transit. Laryngeal movement appreciated via digital palpation. No overt s/s of aspiration/penetration given ice chips.

## 2021-01-05 NOTE — SWALLOW BEDSIDE ASSESSMENT ADULT - DIET PRIOR TO ADMI
According to daughter, Al [phone]), puree diet with "thickened water but regular juice/hot chocolate

## 2021-01-05 NOTE — SWALLOW BEDSIDE ASSESSMENT ADULT - SLP GENERAL OBSERVATIONS
Patient received asleep, easily awoken and repositioned for PO intake. Received on 5L O2 via NC with vitals remaining stable throughout evaluation at SpO2 93% and HR 70. Unable to follow directives consistently, with refusal to accept all trials except ice chips.

## 2021-01-05 NOTE — SWALLOW BEDSIDE ASSESSMENT ADULT - ASR SWALLOW RECOMMEND DIAG
Consider MBS if patient participation increases and warrants objective testing. History of aspiration PNA/VFSS/MBS

## 2021-01-05 NOTE — PROGRESS NOTE ADULT - SUBJECTIVE AND OBJECTIVE BOX
Patient is a 89y old  Male who presents with a chief complaint of shortness of breath, hypoxia (05 Jan 2021 13:44)    Patient seen and examined at bedside.  Pt. with fever overnight, Tmax 101.3.    ALLERGIES:  No Known Allergies    MEDICATIONS  (STANDING):  dextrose 5% + sodium chloride 0.9%. 1000 milliLiter(s) (100 mL/Hr) IV Continuous <Continuous>  escitalopram 5 milliGRAM(s) Oral daily  heparin   Injectable 5000 Unit(s) SubCutaneous every 12 hours  memantine 10 milliGRAM(s) Oral daily  metroNIDAZOLE  IVPB 500 milliGRAM(s) IV Intermittent every 8 hours  mirtazapine 30 milliGRAM(s) Oral daily  OLANZapine 5 milliGRAM(s) Oral daily  rivastigmine patch  9.5 mG/24 Hr(s). 1 Patch Transdermal every 24 hours    MEDICATIONS  (PRN):  acetaminophen  Suppository .. 650 milliGRAM(s) Rectal every 6 hours PRN Temp greater or equal to 38C (100.4F)    Vital Signs Last 24 Hrs  T(F): 98.8 (05 Jan 2021 08:41), Max: 101.3 (05 Jan 2021 01:00)  HR: 74 (05 Jan 2021 08:41) (72 - 79)  BP: 124/63 (05 Jan 2021 08:41) (123/59 - 147/84)  RR: 18 (05 Jan 2021 08:41) (17 - 20)  SpO2: 90% (05 Jan 2021 08:41) (90% - 98%)  I&O's Summary    04 Jan 2021 07:01  -  05 Jan 2021 07:00  --------------------------------------------------------  IN: 1050 mL / OUT: 0 mL / NET: 1050 mL      PHYSICAL EXAM:  General: NAD, Alert, minimally verbal.  ENT: MMM, no thrush  Neck: Supple, No JVD  Lungs: non-labored breathing, +rhonchi at lower lung fields; R>L, no wheezing  Cardio: RRR, S1/S2, No murmurs  Abdomen: Soft, Nontender, Nondistended; Bowel sounds present  Extremities: No calf tenderness, No pitting edema  Neuro:  alert x 1, confused      LABS:                        14.8   13.24 )-----------( 246      ( 04 Jan 2021 11:55 )             47.6     01-04    142  |  104  |  28  ----------------------------<  179  3.6   |  18  |  2.83    Ca    10.5      04 Jan 2021 11:55    TPro  8.2  /  Alb  3.9  /  TBili  0.6  /  DBili  x   /  AST  27  /  ALT  22  /  AlkPhos  140  01-04    eGFR if Non African American: 19 mL/min/1.73M2 (01-04-21 @ 11:55)  eGFR if African American: 21 mL/min/1.73M2 (01-04-21 @ 11:55)    PT/INR - ( 04 Jan 2021 11:55 )   PT: 13.3 sec;   INR: 1.10 ratio         PTT - ( 04 Jan 2021 11:55 )  PTT:23.9 sec  Lactate, Blood: 2.6 mmol/L (01-04 @ 19:40)  Lactate, Blood: 5.3 mmol/L (01-04 @ 14:00)  Lactate, Blood: 9.1 mmol/L (01-04 @ 11:55)              ABG - ( 04 Jan 2021 11:35 )  pH, Arterial: 7.28  pH, Blood: x     /  pCO2: 28    /  pO2: 67    / HCO3: x     / Base Excess: x     /  SaO2: 90                                RADIOLOGY & ADDITIONAL TESTS:    Care Discussed with Consultants/Other Providers:

## 2021-01-05 NOTE — PROGRESS NOTE ADULT - ASSESSMENT
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    # Severe sepsis likely due to Aspiration Pneumonia  # Acute hypoxic respiratory failure  # Lactic acidosis; improving  - CXR with RLL infiltrate; continue Ceftriaxone and Flagyl  - continue IVF; until lactate normalizes, continue to monitor lactate levels  - c/w oxygen supplementation; O2 sat on RA 90%, on 5L of nc 95%  - DNR/DNI    # Advanced Dementia  - baseline A/Ox0  - Continue with home meds; Memantine, Remeron, Olanzapine, Exelon patch    # Hypertension   - Hold anti-hypertensives in setting of sepsis; BP on the lower end  - will continue to monitor    # EMILEE/CKD4  -probably secondary to sepsis/infection  -continue to monitor Creat 2.27>2.83  -avoid nephrotoxins  -continue IVF    # DVT Prophylaxis:  - Heparin    *Updated daughter Jose Thompson [phone] on plan of care.  Pt. is a DNR/DNI.

## 2021-01-05 NOTE — DIETITIAN INITIAL EVALUATION ADULT. - OTHER INFO
89y year old Male with Hx of Advanced Dementia due to Alzheimer's, Parkinson's Disease, Chronic Kidney Disease IV, Hypertension noted  hypoxic  & vomiting noted. Patient with SLP eval , ? MBS , unable to obtain diet history due severe dementia as per HCP , not receptive to EN feeds . No edema No BM noted since PTA.

## 2021-01-05 NOTE — SWALLOW BEDSIDE ASSESSMENT ADULT - SLP PERTINENT HISTORY OF CURRENT PROBLEM
89y year old Male with Hx of Advanced Dementia due to Alzheimer's, Parkinson's Disease, Chronic Kidney Disease IV, Hypertension who is brought in by EMS for hypoxia to 70s at home, vomiting. According to daughter, patient with history of aspiration PNA; therefore pureeing food items and inconsistently thickening liquids.

## 2021-01-06 LAB
ANION GAP SERPL CALC-SCNC: 9 MMOL/L — SIGNIFICANT CHANGE UP (ref 5–17)
BUN SERPL-MCNC: 29 MG/DL — HIGH (ref 7–23)
CALCIUM SERPL-MCNC: 9.8 MG/DL — SIGNIFICANT CHANGE UP (ref 8.4–10.5)
CHLORIDE SERPL-SCNC: 112 MMOL/L — HIGH (ref 96–108)
CO2 SERPL-SCNC: 23 MMOL/L — SIGNIFICANT CHANGE UP (ref 22–31)
CREAT SERPL-MCNC: 2.21 MG/DL — HIGH (ref 0.5–1.3)
GLUCOSE SERPL-MCNC: 104 MG/DL — HIGH (ref 70–99)
HCT VFR BLD CALC: 33.5 % — LOW (ref 39–50)
HGB BLD-MCNC: 11 G/DL — LOW (ref 13–17)
LACTATE SERPL-SCNC: 1.6 MMOL/L — SIGNIFICANT CHANGE UP (ref 0.7–2)
MCHC RBC-ENTMCNC: 31.4 PG — SIGNIFICANT CHANGE UP (ref 27–34)
MCHC RBC-ENTMCNC: 32.8 GM/DL — SIGNIFICANT CHANGE UP (ref 32–36)
MCV RBC AUTO: 95.7 FL — SIGNIFICANT CHANGE UP (ref 80–100)
NRBC # BLD: 0 /100 WBCS — SIGNIFICANT CHANGE UP (ref 0–0)
PLATELET # BLD AUTO: 184 K/UL — SIGNIFICANT CHANGE UP (ref 150–400)
POTASSIUM SERPL-MCNC: 3.6 MMOL/L — SIGNIFICANT CHANGE UP (ref 3.5–5.3)
POTASSIUM SERPL-SCNC: 3.6 MMOL/L — SIGNIFICANT CHANGE UP (ref 3.5–5.3)
RBC # BLD: 3.5 M/UL — LOW (ref 4.2–5.8)
RBC # FLD: 13.2 % — SIGNIFICANT CHANGE UP (ref 10.3–14.5)
SODIUM SERPL-SCNC: 144 MMOL/L — SIGNIFICANT CHANGE UP (ref 135–145)
WBC # BLD: 14.6 K/UL — HIGH (ref 3.8–10.5)
WBC # FLD AUTO: 14.6 K/UL — HIGH (ref 3.8–10.5)

## 2021-01-06 PROCEDURE — 99233 SBSQ HOSP IP/OBS HIGH 50: CPT

## 2021-01-06 RX ADMIN — HEPARIN SODIUM 5000 UNIT(S): 5000 INJECTION INTRAVENOUS; SUBCUTANEOUS at 06:10

## 2021-01-06 RX ADMIN — HEPARIN SODIUM 5000 UNIT(S): 5000 INJECTION INTRAVENOUS; SUBCUTANEOUS at 17:37

## 2021-01-06 RX ADMIN — CEFTRIAXONE 100 MILLIGRAM(S): 500 INJECTION, POWDER, FOR SOLUTION INTRAMUSCULAR; INTRAVENOUS at 17:36

## 2021-01-06 RX ADMIN — Medication 100 MILLIGRAM(S): at 06:10

## 2021-01-06 RX ADMIN — Medication 100 MILLIGRAM(S): at 13:32

## 2021-01-06 RX ADMIN — SODIUM CHLORIDE 100 MILLILITER(S): 9 INJECTION, SOLUTION INTRAVENOUS at 12:16

## 2021-01-06 RX ADMIN — RIVASTIGMINE 1 PATCH: 4.6 PATCH, EXTENDED RELEASE TRANSDERMAL at 21:24

## 2021-01-06 RX ADMIN — Medication 100 MILLIGRAM(S): at 21:24

## 2021-01-06 RX ADMIN — RIVASTIGMINE 1 PATCH: 4.6 PATCH, EXTENDED RELEASE TRANSDERMAL at 21:25

## 2021-01-06 RX ADMIN — RIVASTIGMINE 1 PATCH: 4.6 PATCH, EXTENDED RELEASE TRANSDERMAL at 07:31

## 2021-01-06 RX ADMIN — RIVASTIGMINE 1 PATCH: 4.6 PATCH, EXTENDED RELEASE TRANSDERMAL at 20:21

## 2021-01-06 NOTE — PROCEDURE NOTE - NSPROCDETAILS_GEN_ALL_CORE
location identified, draped/prepped, sterile technique used/blood seen on insertion/dressing applied/flushes easily/secured in place/sterile technique, catheter placed
location identified, draped/prepped, sterile technique used/blood seen on insertion/dressing applied/flushes easily/secured in place/sterile technique, catheter placed

## 2021-01-06 NOTE — PROCEDURE NOTE - ADDITIONAL PROCEDURE DETAILS
peripheral IV insertion with ultrasound guidance
right upper arm 18G IV inserted with ultrasound guidance

## 2021-01-06 NOTE — PROGRESS NOTE ADULT - SUBJECTIVE AND OBJECTIVE BOX
Patient is a 89y old  Male who presents with a chief complaint of shortness of breath, hypoxia (05 Jan 2021 14:15)      SUBJECTIVE / OVERNIGHT EVENTS:  Pt seen and examined at bedside. No acute events overnight.    ROS:  Unable to assess    Allergies    No Known Allergies    Intolerances        MEDICATIONS  (STANDING):  cefTRIAXone   IVPB 1000 milliGRAM(s) IV Intermittent every 24 hours  dextrose 5% + sodium chloride 0.9%. 1000 milliLiter(s) (100 mL/Hr) IV Continuous <Continuous>  escitalopram 5 milliGRAM(s) Oral daily  heparin   Injectable 5000 Unit(s) SubCutaneous every 12 hours  memantine 10 milliGRAM(s) Oral daily  metroNIDAZOLE  IVPB 500 milliGRAM(s) IV Intermittent every 8 hours  mirtazapine 30 milliGRAM(s) Oral daily  OLANZapine 5 milliGRAM(s) Oral daily  rivastigmine patch  9.5 mG/24 Hr(s). 1 Patch Transdermal every 24 hours    MEDICATIONS  (PRN):  acetaminophen  Suppository .. 650 milliGRAM(s) Rectal every 6 hours PRN Temp greater or equal to 38C (100.4F)  OLANZapine Injectable 2.5 milliGRAM(s) IntraMuscular daily PRN agitation      Vital Signs Last 24 Hrs  T(C): 37.2 (06 Jan 2021 08:27), Max: 37.2 (06 Jan 2021 05:44)  T(F): 99 (06 Jan 2021 08:27), Max: 99 (06 Jan 2021 05:44)  HR: 73 (06 Jan 2021 08:27) (73 - 91)  BP: 155/85 (06 Jan 2021 08:27) (154/89 - 165/86)  BP(mean): --  RR: 20 (06 Jan 2021 08:27) (15 - 20)  SpO2: 96% (06 Jan 2021 08:27) (93% - 96%)  CAPILLARY BLOOD GLUCOSE        I&O's Summary    05 Jan 2021 07:01  -  06 Jan 2021 07:00  --------------------------------------------------------  IN: 1200 mL / OUT: 0 mL / NET: 1200 mL    06 Jan 2021 07:01 - 06 Jan 2021 15:25  --------------------------------------------------------  IN: 600 mL / OUT: 0 mL / NET: 600 mL        PHYSICAL EXAM:  GENERAL: NAD, Elderly male  CHEST/LUNG: + Rhonchi bilaterally; No wheeze  HEART: Regular rate and rhythm; No murmurs, rubs, or gallops  ABDOMEN: Soft, Nontender, Nondistended; Bowel sounds present  EXTREMITIES:  2+ Peripheral Pulses, No clubbing, cyanosis, or edema  NEUROLOGY: not responsive, sleepy  PSYCH: calm    LABS:                        11.0   14.60 )-----------( 184      ( 06 Jan 2021 05:45 )             33.5     01-06    144  |  112<H>  |  29<H>  ----------------------------<  104<H>  3.6   |  23  |  2.21<H>    Ca    9.8      06 Jan 2021 05:45                RADIOLOGY & ADDITIONAL TESTS:  Results Reviewed:   Imaging Personally Reviewed:  Electrocardiogram Personally Reviewed:    COORDINATION OF CARE:  Care Discussed with Consultants/Other Providers [Y/N]:  Prior or Outpatient Records Reviewed [Y/N]:

## 2021-01-06 NOTE — PROGRESS NOTE ADULT - ASSESSMENT
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    # Severe sepsis likely due to Aspiration Pneumonia  # Acute hypoxic respiratory failure  # Lactic acidosis; improving  - CXR with RLL infiltrate; continue Ceftriaxone and Flagyl  - continue IVF; until lactate normalizes, continue to monitor lactate levels  - c/w oxygen supplementation; O2 sat on RA 90%, on 5L of nc 95%  - DNR/DNI    # Advanced Dementia  - baseline A/Ox0  - Unable to take by mouth  -Zyprexa IV prn    # Hypertension   -Hydralazine 10mg iv prn     # EMILEE/CKD4  -secondary to sepsis/infection  -downtrending with IVF  -avoid nephrotoxins  -continue IVF    # DVT Prophylaxis:  - Heparin    *Discussed with daughter Jose Thompson [phone]    DNR/DNI/ No feeding tube

## 2021-01-07 LAB
ANION GAP SERPL CALC-SCNC: 10 MMOL/L — SIGNIFICANT CHANGE UP (ref 5–17)
BUN SERPL-MCNC: 24 MG/DL — HIGH (ref 7–23)
CALCIUM SERPL-MCNC: 9.6 MG/DL — SIGNIFICANT CHANGE UP (ref 8.4–10.5)
CHLORIDE SERPL-SCNC: 112 MMOL/L — HIGH (ref 96–108)
CO2 SERPL-SCNC: 22 MMOL/L — SIGNIFICANT CHANGE UP (ref 22–31)
CREAT SERPL-MCNC: 1.83 MG/DL — HIGH (ref 0.5–1.3)
GLUCOSE SERPL-MCNC: 130 MG/DL — HIGH (ref 70–99)
HCT VFR BLD CALC: 34.7 % — LOW (ref 39–50)
HGB BLD-MCNC: 11 G/DL — LOW (ref 13–17)
MCHC RBC-ENTMCNC: 30.4 PG — SIGNIFICANT CHANGE UP (ref 27–34)
MCHC RBC-ENTMCNC: 31.7 GM/DL — LOW (ref 32–36)
MCV RBC AUTO: 95.9 FL — SIGNIFICANT CHANGE UP (ref 80–100)
NRBC # BLD: 0 /100 WBCS — SIGNIFICANT CHANGE UP (ref 0–0)
PLATELET # BLD AUTO: 178 K/UL — SIGNIFICANT CHANGE UP (ref 150–400)
POTASSIUM SERPL-MCNC: 3.5 MMOL/L — SIGNIFICANT CHANGE UP (ref 3.5–5.3)
POTASSIUM SERPL-SCNC: 3.5 MMOL/L — SIGNIFICANT CHANGE UP (ref 3.5–5.3)
RBC # BLD: 3.62 M/UL — LOW (ref 4.2–5.8)
RBC # FLD: 13.2 % — SIGNIFICANT CHANGE UP (ref 10.3–14.5)
SODIUM SERPL-SCNC: 144 MMOL/L — SIGNIFICANT CHANGE UP (ref 135–145)
WBC # BLD: 10.63 K/UL — HIGH (ref 3.8–10.5)
WBC # FLD AUTO: 10.63 K/UL — HIGH (ref 3.8–10.5)

## 2021-01-07 PROCEDURE — 99233 SBSQ HOSP IP/OBS HIGH 50: CPT

## 2021-01-07 RX ORDER — AMLODIPINE BESYLATE 2.5 MG/1
5 TABLET ORAL DAILY
Refills: 0 | Status: DISCONTINUED | OUTPATIENT
Start: 2021-01-07 | End: 2021-01-10

## 2021-01-07 RX ORDER — METOPROLOL TARTRATE 50 MG
50 TABLET ORAL DAILY
Refills: 0 | Status: DISCONTINUED | OUTPATIENT
Start: 2021-01-07 | End: 2021-01-10

## 2021-01-07 RX ADMIN — RIVASTIGMINE 1 PATCH: 4.6 PATCH, EXTENDED RELEASE TRANSDERMAL at 09:23

## 2021-01-07 RX ADMIN — RIVASTIGMINE 1 PATCH: 4.6 PATCH, EXTENDED RELEASE TRANSDERMAL at 19:41

## 2021-01-07 RX ADMIN — AMLODIPINE BESYLATE 5 MILLIGRAM(S): 2.5 TABLET ORAL at 10:03

## 2021-01-07 RX ADMIN — Medication 100 MILLIGRAM(S): at 05:47

## 2021-01-07 RX ADMIN — RIVASTIGMINE 1 PATCH: 4.6 PATCH, EXTENDED RELEASE TRANSDERMAL at 21:51

## 2021-01-07 RX ADMIN — Medication 1 TABLET(S): at 17:51

## 2021-01-07 RX ADMIN — ESCITALOPRAM OXALATE 5 MILLIGRAM(S): 10 TABLET, FILM COATED ORAL at 12:21

## 2021-01-07 RX ADMIN — HEPARIN SODIUM 5000 UNIT(S): 5000 INJECTION INTRAVENOUS; SUBCUTANEOUS at 17:51

## 2021-01-07 RX ADMIN — OLANZAPINE 5 MILLIGRAM(S): 15 TABLET, FILM COATED ORAL at 12:21

## 2021-01-07 RX ADMIN — RIVASTIGMINE 1 PATCH: 4.6 PATCH, EXTENDED RELEASE TRANSDERMAL at 21:42

## 2021-01-07 RX ADMIN — MIRTAZAPINE 30 MILLIGRAM(S): 45 TABLET, ORALLY DISINTEGRATING ORAL at 12:21

## 2021-01-07 RX ADMIN — MEMANTINE HYDROCHLORIDE 10 MILLIGRAM(S): 10 TABLET ORAL at 12:21

## 2021-01-07 RX ADMIN — HEPARIN SODIUM 5000 UNIT(S): 5000 INJECTION INTRAVENOUS; SUBCUTANEOUS at 05:47

## 2021-01-07 NOTE — PROGRESS NOTE ADULT - SUBJECTIVE AND OBJECTIVE BOX
Patient is a 89y old  Male who presents with a chief complaint of shortness of breath, hypoxia (06 Jan 2021 15:24)    Patient seen and examined at bedside.  No events overnight.    ALLERGIES:  No Known Allergies    MEDICATIONS  (STANDING):  amLODIPine   Tablet 5 milliGRAM(s) Oral daily  amoxicillin  875 milliGRAM(s)/clavulanate 1 Tablet(s) Oral two times a day  dextrose 5% + sodium chloride 0.9%. 1000 milliLiter(s) (100 mL/Hr) IV Continuous <Continuous>  escitalopram 5 milliGRAM(s) Oral daily  heparin   Injectable 5000 Unit(s) SubCutaneous every 12 hours  memantine 10 milliGRAM(s) Oral daily  metoprolol succinate ER 50 milliGRAM(s) Oral daily  mirtazapine 30 milliGRAM(s) Oral daily  OLANZapine 5 milliGRAM(s) Oral daily  rivastigmine patch  9.5 mG/24 Hr(s). 1 Patch Transdermal every 24 hours    MEDICATIONS  (PRN):  acetaminophen  Suppository .. 650 milliGRAM(s) Rectal every 6 hours PRN Temp greater or equal to 38C (100.4F)  OLANZapine Injectable 2.5 milliGRAM(s) IntraMuscular daily PRN agitation    Vital Signs Last 24 Hrs  T(F): 98.3 (07 Jan 2021 08:32), Max: 99.4 (06 Jan 2021 16:00)  HR: 70 (07 Jan 2021 08:32) (70 - 81)  BP: 165/70 (07 Jan 2021 08:32) (150/88 - 165/70)  RR: 18 (07 Jan 2021 08:32) (17 - 20)  SpO2: 95% (07 Jan 2021 08:32) (95% - 97%)  I&O's Summary    06 Jan 2021 07:01  -  07 Jan 2021 07:00  --------------------------------------------------------  IN: 1100 mL / OUT: 0 mL / NET: 1100 mL      PHYSICAL EXAM:  General: NAD, sleepy, is arousable.  ENT: MMM, no thrush  Neck: Supple, No JVD  Lungs: non-labored breathing, poor inspiratory effort, otherwise CTA b/l, no w/r/r  Cardio: RRR, S1/S2, No murmurs  Abdomen: Soft, Nontender, Nondistended; Bowel sounds present  Extremities: No calf tenderness, No pitting edema  Neuro:  nonfocal    LABS:                        11.0   14.60 )-----------( 184      ( 06 Jan 2021 05:45 )             33.5     01-06    144  |  112  |  29  ----------------------------<  104  3.6   |  23  |  2.21    Ca    9.8      06 Jan 2021 05:45    TPro  8.2  /  Alb  3.9  /  TBili  0.6  /  DBili  x   /  AST  27  /  ALT  22  /  AlkPhos  140  01-04    eGFR if Non African American: 25 mL/min/1.73M2 (01-06-21 @ 05:45)  eGFR if African American: 29 mL/min/1.73M2 (01-06-21 @ 05:45)    PT/INR - ( 04 Jan 2021 11:55 )   PT: 13.3 sec;   INR: 1.10 ratio         PTT - ( 04 Jan 2021 11:55 )  PTT:23.9 sec  Lactate, Blood: 1.6 mmol/L (01-06 @ 05:30)  Lactate, Blood: 2.6 mmol/L (01-04 @ 19:40)  Lactate, Blood: 5.3 mmol/L (01-04 @ 14:00)  Lactate, Blood: 9.1 mmol/L (01-04 @ 11:55)              ABG - ( 04 Jan 2021 11:35 )  pH, Arterial: 7.28  pH, Blood: x     /  pCO2: 28    /  pO2: 67    / HCO3: x     / Base Excess: x     /  SaO2: 90                              Culture - Blood (collected 04 Jan 2021 11:55)  Source: .Blood Blood-Peripheral  Preliminary Report (05 Jan 2021 17:02):    No growth to date.    Culture - Blood (collected 04 Jan 2021 11:55)  Source: .Blood Blood-Peripheral  Preliminary Report (05 Jan 2021 17:02):    No growth to date.        RADIOLOGY & ADDITIONAL TESTS:    Care Discussed with Consultants/Other Providers:

## 2021-01-07 NOTE — PROGRESS NOTE ADULT - ASSESSMENT
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    # Severe sepsis; resolved  # Aspiration Pneumonia  # Acute hypoxic respiratory failure  # Lactic acidosis; resolved  -CXR with RLL infiltrate; will change IV Ceftriaxone and Flagyl to oral Augmentin  -c/w oxygen supplementation; O2 sat on RA drops to 87%, will need home O2  -pt is DNR/DNI    # Advanced Dementia  - baseline A/Ox0  - continue Zyprexa, Lexapro, Memantine, Remeron, Exelon patch    # Hypertension   -continue Norvasc, Metoprolol    # EMILEE/CKD4  -secondary to sepsis/infection  -Creat downtrending with IVF  -avoid nephrotoxins    # DVT Prophylaxis:  - Heparin    * daughter Jose Thompson [phone] updated.    DNR/DNI/ No feeding tube

## 2021-01-08 LAB
ANION GAP SERPL CALC-SCNC: 12 MMOL/L — SIGNIFICANT CHANGE UP (ref 5–17)
BUN SERPL-MCNC: 25 MG/DL — HIGH (ref 7–23)
CALCIUM SERPL-MCNC: 9.9 MG/DL — SIGNIFICANT CHANGE UP (ref 8.4–10.5)
CHLORIDE SERPL-SCNC: 112 MMOL/L — HIGH (ref 96–108)
CO2 SERPL-SCNC: 18 MMOL/L — LOW (ref 22–31)
CREAT SERPL-MCNC: 1.9 MG/DL — HIGH (ref 0.5–1.3)
GLUCOSE SERPL-MCNC: 87 MG/DL — SIGNIFICANT CHANGE UP (ref 70–99)
HCT VFR BLD CALC: 35.3 % — LOW (ref 39–50)
HGB BLD-MCNC: 11.5 G/DL — LOW (ref 13–17)
MCHC RBC-ENTMCNC: 31 PG — SIGNIFICANT CHANGE UP (ref 27–34)
MCHC RBC-ENTMCNC: 32.6 GM/DL — SIGNIFICANT CHANGE UP (ref 32–36)
MCV RBC AUTO: 95.1 FL — SIGNIFICANT CHANGE UP (ref 80–100)
NRBC # BLD: 0 /100 WBCS — SIGNIFICANT CHANGE UP (ref 0–0)
PLATELET # BLD AUTO: 197 K/UL — SIGNIFICANT CHANGE UP (ref 150–400)
POTASSIUM SERPL-MCNC: 3.8 MMOL/L — SIGNIFICANT CHANGE UP (ref 3.5–5.3)
POTASSIUM SERPL-SCNC: 3.8 MMOL/L — SIGNIFICANT CHANGE UP (ref 3.5–5.3)
RBC # BLD: 3.71 M/UL — LOW (ref 4.2–5.8)
RBC # FLD: 12.9 % — SIGNIFICANT CHANGE UP (ref 10.3–14.5)
SODIUM SERPL-SCNC: 142 MMOL/L — SIGNIFICANT CHANGE UP (ref 135–145)
WBC # BLD: 10.66 K/UL — HIGH (ref 3.8–10.5)
WBC # FLD AUTO: 10.66 K/UL — HIGH (ref 3.8–10.5)

## 2021-01-08 PROCEDURE — 99233 SBSQ HOSP IP/OBS HIGH 50: CPT

## 2021-01-08 PROCEDURE — 93306 TTE W/DOPPLER COMPLETE: CPT | Mod: 26

## 2021-01-08 RX ADMIN — HEPARIN SODIUM 5000 UNIT(S): 5000 INJECTION INTRAVENOUS; SUBCUTANEOUS at 16:40

## 2021-01-08 RX ADMIN — OLANZAPINE 5 MILLIGRAM(S): 15 TABLET, FILM COATED ORAL at 08:41

## 2021-01-08 RX ADMIN — RIVASTIGMINE 1 PATCH: 4.6 PATCH, EXTENDED RELEASE TRANSDERMAL at 21:06

## 2021-01-08 RX ADMIN — SODIUM CHLORIDE 100 MILLILITER(S): 9 INJECTION, SOLUTION INTRAVENOUS at 05:45

## 2021-01-08 RX ADMIN — ESCITALOPRAM OXALATE 5 MILLIGRAM(S): 10 TABLET, FILM COATED ORAL at 08:41

## 2021-01-08 RX ADMIN — Medication 50 MILLIGRAM(S): at 05:45

## 2021-01-08 RX ADMIN — MEMANTINE HYDROCHLORIDE 10 MILLIGRAM(S): 10 TABLET ORAL at 08:41

## 2021-01-08 RX ADMIN — Medication 1 TABLET(S): at 05:45

## 2021-01-08 RX ADMIN — RIVASTIGMINE 1 PATCH: 4.6 PATCH, EXTENDED RELEASE TRANSDERMAL at 20:35

## 2021-01-08 RX ADMIN — MIRTAZAPINE 30 MILLIGRAM(S): 45 TABLET, ORALLY DISINTEGRATING ORAL at 08:41

## 2021-01-08 RX ADMIN — HEPARIN SODIUM 5000 UNIT(S): 5000 INJECTION INTRAVENOUS; SUBCUTANEOUS at 05:45

## 2021-01-08 RX ADMIN — AMLODIPINE BESYLATE 5 MILLIGRAM(S): 2.5 TABLET ORAL at 05:45

## 2021-01-08 RX ADMIN — RIVASTIGMINE 1 PATCH: 4.6 PATCH, EXTENDED RELEASE TRANSDERMAL at 19:19

## 2021-01-08 RX ADMIN — Medication 1 TABLET(S): at 16:40

## 2021-01-08 RX ADMIN — RIVASTIGMINE 1 PATCH: 4.6 PATCH, EXTENDED RELEASE TRANSDERMAL at 07:19

## 2021-01-08 NOTE — CHART NOTE - NSCHARTNOTEFT_GEN_A_CORE
Interval events: Called by RN for lactate 2.6.   Pt laying in bed, alert but not engaging in conversation likely due to advanced dementia. Lactate is downtrending from 9.1 to 5.2 to 2.6. Currently on d5NS at 100cc/hr.     Review of Systems unable to obtain due to advanced dementia.    T(F): 99 (01-04-21 @ 18:50), Max: 99.6 (01-04-21 @ 12:15)  HR: 72 (01-04-21 @ 18:50) (72 - 93)  BP: 147/77 (01-04-21 @ 18:50) (119/81 - 164/69)  RR: 17 (01-04-21 @ 18:50) (17 - 25)  SpO2: 98% (01-04-21 @ 18:50) (93% - 98%)  Wt(kg): --    Physical Exam:   Gen: NAD, not following commands  Neuro: A&Ox0   HEENT: MMM  Resp: no respiratory distress, +air entry  CVS: S1S2+  Abd: nontender    Meds:  cefTRIAXone   IVPB IV Intermittent  metroNIDAZOLE  IVPB IV Intermittent  escitalopram Oral  memantine Oral  mirtazapine Oral  OLANZapine Oral  rivastigmine patch  9.5 mG/24 Hr(s). Transdermal  heparin   Injectable SubCutaneous  dextrose 5% + sodium chloride 0.9%. IV Continuous                       14.8   13.24 )-----------( 246      ( 04 Jan 2021 11:55 )             47.6     01-04  142  |  104  |  28<H>  ----------------------------<  179<H>  3.6   |  18<L>  |  2.83<H>    Ca    10.5      04 Jan 2021 11:55    TPro  8.2  /  Alb  3.9  /  TBili  0.6  /  DBili  x   /  AST  27  /  ALT  22  /  AlkPhos  140<H>  01-04    Lactate 2.6           01-04 @ 19:40  Lactate 5.3           01-04 @ 14:00  Lactate 9.1           01-04 @ 11:55    PT/INR - ( 04 Jan 2021 11:55 )   PT: 13.3 sec;   INR: 1.10 ratio      PTT - ( 04 Jan 2021 11:55 )  PTT:23.9 sec    Radiology:  < from: Xray Chest 1 View- PORTABLE-Urgent (01.04.21 @ 12:18) >  Impression: Right lower lobe pneumonia (uncomplicated).  < end of copied text >    < from: CT Abdomen and Pelvis No Cont (10.24.20 @ 12:07) >  IMPRESSION:  Subpleural nonspecific left basilar airspace opacity. Infection is a differential consideration  No acute pathology in the abdomen/pelvis. Specifically, no evidence of bowel obstruction as questioned.  < end of copied text >    A&P: 89 year old male with Hx of 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 Admitted with aspiration pneumonia.    Severe sepsis likely due to aspiration pneumonia, acute hypoxic respiratory failure  Lactic acidosis  -IVF d5ns 100cc/hr  -will repeat lactate in am  -now satting 96% on 5L NC  -cont antibiotics as per primary team  -blood cultures pending, f/u  -vitals per routine  -rest of care per primary team
Patient seen for follow-up to assess least restrictive diet. Spoke with Rn prior to entering room. Patient required minimal encouragement to awaken and readily accepted PO trials of puree, honey thick liquids and thin liquids (water). Patient required assistance with feeding, reduced oral preparation, prolonged A-P transit, pharyngeal trigger suspected to be timely given age, palpable hyo-laryngeal elevation, no clinical s/s of penetration/aspiration during all trials given. Patient left in no distress, call bell in place.       Impression: chronic oropharyngeal dysphagia    Recommendation: given improved mentation and willingness to participate, patient to be placed back on baseline diet of puree/honey thickened liquids, require 1:1 assistance and encouragement during PO intake, maintain aspiration precaution    Spoke with MD Aguilera and FORREST Welsh on recommendation
NUTRITION FOLLOW UP    SOURCE: Patient [ )   Family [ ]    Medical Record (X)    Diet, Dysphagia 1 Pureed-Honey Consistency Fluid (01-07-21 @ 08:05) [Active]    Patient remains confused. Eating fair with total assistance at meals. No feeding tube per patient wishes. Will continue to monitor/follow as indicated. Patient meets criteria for moderate protein-calorie malnutrition and remains at a moderate nutrition risk.     PO INTAKE: ~50%-75%     CURRENT WEIGHT: 169# (1/8)  168# (1/4)    PERTINENT MEDS:   Pertinent Medications: MEDICATIONS  (STANDING):  amLODIPine   Tablet 5 milliGRAM(s) Oral daily  amoxicillin  875 milliGRAM(s)/clavulanate 1 Tablet(s) Oral two times a day  escitalopram 5 milliGRAM(s) Oral daily  heparin   Injectable 5000 Unit(s) SubCutaneous every 12 hours  memantine 10 milliGRAM(s) Oral daily  metoprolol succinate ER 50 milliGRAM(s) Oral daily  mirtazapine 30 milliGRAM(s) Oral daily  OLANZapine 5 milliGRAM(s) Oral daily  rivastigmine patch  9.5 mG/24 Hr(s). 1 Patch Transdermal every 24 hours    MEDICATIONS  (PRN):  acetaminophen  Suppository .. 650 milliGRAM(s) Rectal every 6 hours PRN Temp greater or equal to 38C (100.4F)  OLANZapine Injectable 2.5 milliGRAM(s) IntraMuscular daily PRN agitation      PERTINENT LABS:  01-08 Na142 mmol/L Glu 87 mg/dL K+ 3.8 mmol/L Cr  1.90 mg/dL<H> BUN 25 mg/dL<H> 01-04 Alb 3.9 g/dL    SKIN:   no pressure areas   EDEMA: none noted    ESTIMATED NEEDS:   [X] no change since previous assessment  [ ] recalculated:     PREVIOUS NUTRITION DIAGNOSIS:  dysphagia     NUTRITION DIAGNOSIS is :  ( x )  Ongoing      (    ) Resolved,  RD will follow as per nutrition department protocol.    NEW NUTRITION DIAGNOSIS: moderate protein-calorie malnutrition    NUTRITION RECOMMENDATIONS:   1. Continue diet as ordered. Texture per SLP recommendations.   2. Provide assistance with meals to encourage PO.     MONITORING AND EVALUATION:   1. Tolerance to diet prescription   2. PO intake  3. Weights  4. Labs  5. Follow Up per protocol     RD to remain available   Damaris Tobias RDN #[id number]
PCA reports patient did well with lunch. Patient alert, nonverbal. Trialed with puree, nectar thick, and thin liquids. Oral grading WNL, oral holding with reduced oral preparation noted resulting in mild lingual stasis post swallow. Suspected latent swallow trigger, hyolaryngeal excursion appreciated upon palpation. No clinical signs of aspiration however patient with history of aspiration PNA and Parkinson's Disase - which literature indicates increased risk of silent aspiration. Therefore will continue more conservative liquid consistency.   Impression: Oral-pharyngeal dysphagia   Recommendations: 1. Puree diet with nectar thick liquids 2. 1:1 with meals 3. Oral care after meals 4. Monitor for signs of aspiration 5. Crush meds in puree 6. Reconsult with SLP if status changes

## 2021-01-08 NOTE — PROGRESS NOTE ADULT - SUBJECTIVE AND OBJECTIVE BOX
Patient is a 89y old  Male who presents with a chief complaint of shortness of breath, hypoxia (07 Jan 2021 11:09)    Patient seen and examined at bedside.  Pt. sleepy, offers no complaints.    ALLERGIES:  No Known Allergies    MEDICATIONS  (STANDING):  amLODIPine   Tablet 5 milliGRAM(s) Oral daily  amoxicillin  875 milliGRAM(s)/clavulanate 1 Tablet(s) Oral two times a day  escitalopram 5 milliGRAM(s) Oral daily  heparin   Injectable 5000 Unit(s) SubCutaneous every 12 hours  memantine 10 milliGRAM(s) Oral daily  metoprolol succinate ER 50 milliGRAM(s) Oral daily  mirtazapine 30 milliGRAM(s) Oral daily  OLANZapine 5 milliGRAM(s) Oral daily  rivastigmine patch  9.5 mG/24 Hr(s). 1 Patch Transdermal every 24 hours    MEDICATIONS  (PRN):  acetaminophen  Suppository .. 650 milliGRAM(s) Rectal every 6 hours PRN Temp greater or equal to 38C (100.4F)  OLANZapine Injectable 2.5 milliGRAM(s) IntraMuscular daily PRN agitation    Vital Signs Last 24 Hrs  T(F): 99.1 (08 Jan 2021 06:54), Max: 99.1 (08 Jan 2021 06:54)  HR: 81 (08 Jan 2021 06:54) (68 - 81)  BP: 168/79 (08 Jan 2021 06:54) (126/68 - 168/79)  RR: 17 (08 Jan 2021 06:54) (17 - 18)  SpO2: 93% (08 Jan 2021 06:54) (93% - 95%)  I&O's Summary    07 Jan 2021 07:01  -  08 Jan 2021 07:00  --------------------------------------------------------  IN: 600 mL / OUT: 404 mL / NET: 196 mL      PHYSICAL EXAM:  General: NAD, sleepy.  ENT: MMM, no thrush  Neck: Supple, No JVD  Lungs: non-labored breathing, decreased breath sounds at bases b/l, no w/r/r  Cardio: RRR, S1/S2, No murmurs  Abdomen: Soft, Nontender, Nondistended; Bowel sounds present  Extremities: No calf tenderness, No pitting edema  Neuro:  no focal deficits, confused, difficulty following commands    LABS:                        11.5   10.66 )-----------( 197      ( 08 Jan 2021 08:29 )             35.3     01-08    142  |  112  |  25  ----------------------------<  87  3.8   |  18  |  1.90    Ca    9.9      08 Jan 2021 06:18      eGFR if Non African American: 30 mL/min/1.73M2 (01-08-21 @ 06:18)  eGFR if African American: 35 mL/min/1.73M2 (01-08-21 @ 06:18)      Lactate, Blood: 1.6 mmol/L (01-06 @ 05:30)            Culture - Blood (collected 04 Jan 2021 11:55)  Source: .Blood Blood-Peripheral  Preliminary Report (05 Jan 2021 17:02):    No growth to date.    Culture - Blood (collected 04 Jan 2021 11:55)  Source: .Blood Blood-Peripheral  Preliminary Report (05 Jan 2021 17:02):    No growth to date.        RADIOLOGY & ADDITIONAL TESTS:    Care Discussed with Consultants/Other Providers:    Patient is a 89y old  Male who presents with a chief complaint of shortness of breath, hypoxia (07 Jan 2021 11:09)    Patient seen and examined at bedside.  Pt. sleepy, offers no complaints.    ALLERGIES:  No Known Allergies    MEDICATIONS  (STANDING):  amLODIPine   Tablet 5 milliGRAM(s) Oral daily  amoxicillin  875 milliGRAM(s)/clavulanate 1 Tablet(s) Oral two times a day  escitalopram 5 milliGRAM(s) Oral daily  heparin   Injectable 5000 Unit(s) SubCutaneous every 12 hours  memantine 10 milliGRAM(s) Oral daily  metoprolol succinate ER 50 milliGRAM(s) Oral daily  mirtazapine 30 milliGRAM(s) Oral daily  OLANZapine 5 milliGRAM(s) Oral daily  rivastigmine patch  9.5 mG/24 Hr(s). 1 Patch Transdermal every 24 hours    MEDICATIONS  (PRN):  acetaminophen  Suppository .. 650 milliGRAM(s) Rectal every 6 hours PRN Temp greater or equal to 38C (100.4F)  OLANZapine Injectable 2.5 milliGRAM(s) IntraMuscular daily PRN agitation    Vital Signs Last 24 Hrs  T(F): 99.1 (08 Jan 2021 06:54), Max: 99.1 (08 Jan 2021 06:54)  HR: 81 (08 Jan 2021 06:54) (68 - 81)  BP: 168/79 (08 Jan 2021 06:54) (126/68 - 168/79)  RR: 17 (08 Jan 2021 06:54) (17 - 18)  SpO2: 93% (08 Jan 2021 06:54) (93% - 95%)  I&O's Summary    07 Jan 2021 07:01  -  08 Jan 2021 07:00  --------------------------------------------------------  IN: 600 mL / OUT: 404 mL / NET: 196 mL      PHYSICAL EXAM:  General: NAD, sleepy.  ENT: MMM, no thrush  Neck: Supple, No JVD  Lungs: non-labored breathing, decreased breath sounds at bases b/l, no w/r/r  Cardio: RRR, S1/S2, No murmurs  Abdomen: Soft, Nontender, Nondistended; Bowel sounds present  Extremities: No calf tenderness, No pitting edema  Neuro:  no focal deficits, confused, difficulty following commands    LABS:                        11.5   10.66 )-----------( 197      ( 08 Jan 2021 08:29 )             35.3     01-08    142  |  112  |  25  ----------------------------<  87  3.8   |  18  |  1.90    Ca    9.9      08 Jan 2021 06:18      eGFR if Non African American: 30 mL/min/1.73M2 (01-08-21 @ 06:18)  eGFR if African American: 35 mL/min/1.73M2 (01-08-21 @ 06:18)      Lactate, Blood: 1.6 mmol/L (01-06 @ 05:30)            Culture - Blood (collected 04 Jan 2021 11:55)  Source: .Blood Blood-Peripheral  Preliminary Report (05 Jan 2021 17:02):    No growth to date.    Culture - Blood (collected 04 Jan 2021 11:55)  Source: .Blood Blood-Peripheral  Preliminary Report (05 Jan 2021 17:02):    No growth to date.        RADIOLOGY & ADDITIONAL TESTS:  < from: TTE Echo Complete w/o Contrast w/ Doppler (01.08.21 @ 11:57) >      Summary:   1. Left ventricular ejection fraction, by visual estimation, is 65 to 70%.   2. Normal global left ventricular systolic function.   3. Basal inferior segment is abnormal as described above.   4. Mildly increased LV wall thickness.   5. Normal left ventricular internal cavity size.   6. Spectral Doppler shows impaired relaxation pattern of left ventricular myocardial filling (Grade I diastolic dysfunction).   7. There is mild concentric left ventricular hypertrophy.   8. Mild thickening and calcification of the anterior and posterior mitral valve leaflets.   9. Trace mitral valve regurgitation.  10. Mild tricuspid regurgitation.  11. Sclerotic aortic valve with decreased opening.  12. Estimated pulmonary artery systolic pressure is 44.8 mmHg assuming a right atrial pressure of 10 mmHg, which is consistent with mild pulmonary hypertension.  13. There is mild aortic root calcification.    < end of copied text >    Care Discussed with Consultants/Other Providers:

## 2021-01-08 NOTE — PHYSICAL THERAPY INITIAL EVALUATION ADULT - REFERRING PHYSICIAN, REHAB EVAL
SUBJECTIVE:   Ms. Aldo Brooke is a 80 y.o. female who is here for follow up of annual medicare wellness visit. Pt is fasting. Pt's BP is well controlled at 137/53 today. Pt reports doing chair aerobics and walking for regular exercise. ROUTINE HEALTH MAINTENANCE:   Pneumonia: due, Prevnar 13 given today   Tdap: due, prescription printed today   Shingles: due, prescription printed today   Opthalmology: utd   Flu: will return for flu vaccine   Colonoscopy: pt unsure of last date   Mammogram: due 1/2018, pt will schedule    At this time, she is otherwise doing well and has brought no other complaints to my attention today. For a list of the medical issues addressed today, see the assessment and plan below. PMH:   Past Medical History:   Diagnosis Date    Arthritis     inflammatory Enrico Comas); rheumatoid?  Bladder incontinence 1997    Contact dermatitis and other eczema, due to unspecified cause     rosacea; chronic urticaria; Dr. Ortiz Salts Hypertension     Neuropathy, peripheral ([facility] [address]     Perennial allergic rhinitis     chronic sinusitis     PSH:  has a past surgical history that includes urological; foot/toes surgery proc unlisted (1/2001); drea and bso (1970's); endoscopy, colon, diagnostic (2001); breast biopsy (Left, long ago); and [facility] us bx breast lt vac asst (Left, 01/20/2016). All: is allergic to nsaids (non-steroidal anti-inflammatory drug). MEDS:   Current Outpatient Prescriptions   Medication Sig    diph,pertuss,acel,,tetanus vac,PF, (ADACEL) 2 Lf-(2.5-5-3-5 mcg)-5Lf/0.5 mL syrg vaccine 0.5 mL by IntraMUSCular route once for 1 dose.  varicella zoster vacine live (ZOSTAVAX) 19,400 unit/0.65 mL susr injection 1 Vial by SubCUTAneous route once for 1 dose.  zolpidem (AMBIEN) 5 mg tablet Take 1/2 tablet every evening.     amLODIPine (NORVASC) 2.5 mg tablet TAKE 1 TABLET BY MOUTH DAILY    atenolol-chlorthalidone (TENORETIC) 50-25 mg per tablet TAKE 1 TABLET BY MOUTH ONCE EVERY DAY    aspirin delayed-release 81 mg tablet Take 81 mg by mouth daily.  MULTIVIT &MINERALS/FERROUS FUM (MULTI VITAMIN PO) Take  by mouth. No current facility-administered medications for this visit. FH: family history includes Breast Cancer in her mother; Cancer in her sister; Cancer (age of onset: 79) in her mother; Diabetes in her brother; Other in her father. SH:  reports that she has never smoked. She has never used smokeless tobacco. She reports that she does not drink alcohol or use illicit drugs. Review of Systems - History obtained from the patient  General ROS: negative  Psychological ROS: negative  Ophthalmic ROS: negative  ENT ROS: negative  Respiratory ROS: no cough, shortness of breath, or wheezing  Cardiovascular ROS: no chest pain or dyspnea on exertion  Gastrointestinal ROS: no abdominal pain, change in bowel habits, or black or bloody stools  Genito-Urinary ROS: negative  Musculoskeletal ROS: negative  Neurological ROS: negative  Dermatological ROS: negative    OBJECTIVE:   Vitals:   Visit Vitals    /53 (BP 1 Location: Left arm, BP Patient Position: Sitting)    Pulse (!) 59    Temp 97.9 °F (36.6 °C) (Oral)    Resp 16    Ht 5' 6.5\" (1.689 m)    Wt 164 lb 12.8 oz (74.8 kg)    LMP  (LMP Unknown)    SpO2 96%    BMI 26.2 kg/m2      Gen: Pleasant 80 y.o.  female in NAD. HEENT: NC/AT. HEART: RRR, No M/G/R. LUNGS: CTAB No W/R. EXTREMITIES: Warm. No C/C/E. NEURO: Alert and oriented x 3. Cranial nerves grossly intact. No focal sensory or motor deficits noted. SKIN: Warm. Dry. No rashes or other lesions noted. ASSESSMENT/ PLAN:     Diagnoses and all orders for this visit:    1.  Medicare annual wellness visit, subsequent  -     diph,pertuss,acel,,tetanus vac,PF, (ADACEL) 2 Lf-(2.5-5-3-5 mcg)-5Lf/0.5 mL syrg vaccine; 0.5 mL by IntraMUSCular route once for 1 dose.  -     varicella zoster vacine live (ZOSTAVAX) 19,400 unit/0.65 mL susr injection; 1 Vial by SubCUTAneous route once for 1 dose. 2. Encounter for immunization  -     Pneumococcal Conjugate vaccine - 13 valent (50 years and older)  -     ADMIN PNEUMOCOCCAL VACCINE  Medicare Injection Admin Charge  -     diph,pertuss,acel,,tetanus vac,PF, (ADACEL) 2 Lf-(2.5-5-3-5 mcg)-5Lf/0.5 mL syrg vaccine; 0.5 mL by IntraMUSCular route once for 1 dose.  -     varicella zoster vacine live (ZOSTAVAX) 19,400 unit/0.65 mL susr injection; 1 Vial by SubCUTAneous route once for 1 dose. 3. Screening for alcoholism  -     Annual  Alcohol Screen 15 min ()    4. Screening for depression  -     Depression Screen Annual    5. Disorder of bone and cartilage, unspecified  -     Dexa Bone Density Study Axial ([id number]); Future      Pt had her annual medicare wellness visit today. Pt was given the Prevnar 13 vaccine in the office today. Pt was given printed prescriptions for Adacel and Zostavax for routine immunization. I ordered a Dexa bone density scan for continued monitoring of disorder of bone and cartilage. Pt was given Advanced Care Planning information today. Pt will f/u in one year for annual wellness visit. Follow-up Disposition:  Return in about 1 year (around 8/30/2018) for annual wellness visit. I have reviewed the patient's medications and risks/side effects/benefits were discussed. Diagnosis(-es) explained to patient and questions answered. Literature provided where appropriate.      Written by Merlinda Goes, as dictated by Lisbet Reddy MD. Dr. Aguilera

## 2021-01-08 NOTE — PROGRESS NOTE ADULT - ASSESSMENT
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    # Severe sepsis; resolved  # Aspiration Pneumonia  # Acute hypoxic respiratory failure  # Lactic acidosis; resolved  -CXR with +RLL infiltrate; was on IV Ceftriaxone and Flagyl; now on oral Augmentin, day 2/5  -c/w oxygen supplementation; O2 sat on RA drops to 87%, will need home O2  -pt is DNR/DNI    # Advanced Dementia  - baseline A/Ox0  - continue Zyprexa, Lexapro, Memantine, Remeron, Exelon patch    # Hypertension   -continue Norvasc, Metoprolol    # EMILEE/CKD4  -secondary to sepsis/infection  -Creat downtrending; s/p IVF  -avoid nephrotoxins    # DVT Prophylaxis:  - Heparin    *daughter Jose Thompson [phone] updated.  Will d/c home today on home O2.    DNR/DNI/ No feeding tube 89y year old Male with Hx of 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    # Severe sepsis; resolved  # Aspiration Pneumonia  # Acute hypoxic respiratory failure  # Lactic acidosis; resolved  -CXR with +RLL infiltrate; was on IV Ceftriaxone and Flagyl; now on oral Augmentin, day 2/5  -c/w oxygen supplementation; O2 sat on RA drops to 87%, will need home O2  -pt is DNR/DNI    # Advanced Dementia  - baseline A/Ox0  - continue Zyprexa, Lexapro, Memantine, Remeron, Exelon patch    # Hypertension   -continue Norvasc, Metoprolol    # EMILEE/CKD4  -secondary to sepsis/infection  -Creat downtrending; s/p IVF  -avoid nephrotoxins    # DVT Prophylaxis:  - Heparin    *daughter Jose Thompson [phone] updated.  Will d/c home probably in the next 24 hrs on home O2.    DNR/DNI/ No feeding tube 89y year old Male with Hx of 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    # Severe sepsis; resolved  # Aspiration Pneumonia  # Acute hypoxic respiratory failure  # Lactic acidosis; resolved  -CXR with +RLL infiltrate; was on IV Ceftriaxone and Flagyl; now on oral Augmentin, day 2/5  -c/w oxygen supplementation; O2 sat on RA drops to 87%, will need home O2  -pt is DNR/DNI    # Advanced Dementia  - baseline A/Ox0  - continue Zyprexa, Lexapro, Memantine, Remeron, Exelon patch    # Hypertension   -continue Norvasc, Metoprolol    #Pulm HTN  -On echo today with mild pulm htn  -needs home O2 support    # EMILEE/CKD4  -secondary to sepsis/infection  -Creat downtrending; s/p IVF  -avoid nephrotoxins    # DVT Prophylaxis:  - Heparin    *daughter Jose Thompson [phone] updated.  Will d/c home probably in the next 24 hrs on home O2.    DNR/DNI/ No feeding tube

## 2021-01-09 LAB
ANION GAP SERPL CALC-SCNC: 9 MMOL/L — SIGNIFICANT CHANGE UP (ref 5–17)
BUN SERPL-MCNC: 24 MG/DL — HIGH (ref 7–23)
CALCIUM SERPL-MCNC: 10.1 MG/DL — SIGNIFICANT CHANGE UP (ref 8.4–10.5)
CHLORIDE SERPL-SCNC: 109 MMOL/L — HIGH (ref 96–108)
CO2 SERPL-SCNC: 23 MMOL/L — SIGNIFICANT CHANGE UP (ref 22–31)
CREAT SERPL-MCNC: 1.89 MG/DL — HIGH (ref 0.5–1.3)
CULTURE RESULTS: SIGNIFICANT CHANGE UP
CULTURE RESULTS: SIGNIFICANT CHANGE UP
GLUCOSE SERPL-MCNC: 98 MG/DL — SIGNIFICANT CHANGE UP (ref 70–99)
POTASSIUM SERPL-MCNC: 3.4 MMOL/L — LOW (ref 3.5–5.3)
POTASSIUM SERPL-SCNC: 3.4 MMOL/L — LOW (ref 3.5–5.3)
SODIUM SERPL-SCNC: 141 MMOL/L — SIGNIFICANT CHANGE UP (ref 135–145)
SPECIMEN SOURCE: SIGNIFICANT CHANGE UP
SPECIMEN SOURCE: SIGNIFICANT CHANGE UP

## 2021-01-09 PROCEDURE — 99232 SBSQ HOSP IP/OBS MODERATE 35: CPT

## 2021-01-09 RX ORDER — POTASSIUM CHLORIDE 20 MEQ
20 PACKET (EA) ORAL ONCE
Refills: 0 | Status: DISCONTINUED | OUTPATIENT
Start: 2021-01-09 | End: 2021-01-10

## 2021-01-09 RX ADMIN — MEMANTINE HYDROCHLORIDE 10 MILLIGRAM(S): 10 TABLET ORAL at 11:42

## 2021-01-09 RX ADMIN — RIVASTIGMINE 1 PATCH: 4.6 PATCH, EXTENDED RELEASE TRANSDERMAL at 20:17

## 2021-01-09 RX ADMIN — RIVASTIGMINE 1 PATCH: 4.6 PATCH, EXTENDED RELEASE TRANSDERMAL at 06:04

## 2021-01-09 RX ADMIN — ESCITALOPRAM OXALATE 5 MILLIGRAM(S): 10 TABLET, FILM COATED ORAL at 11:42

## 2021-01-09 RX ADMIN — MIRTAZAPINE 30 MILLIGRAM(S): 45 TABLET, ORALLY DISINTEGRATING ORAL at 11:46

## 2021-01-09 RX ADMIN — HEPARIN SODIUM 5000 UNIT(S): 5000 INJECTION INTRAVENOUS; SUBCUTANEOUS at 06:28

## 2021-01-09 RX ADMIN — OLANZAPINE 5 MILLIGRAM(S): 15 TABLET, FILM COATED ORAL at 11:42

## 2021-01-09 RX ADMIN — AMLODIPINE BESYLATE 5 MILLIGRAM(S): 2.5 TABLET ORAL at 06:29

## 2021-01-09 RX ADMIN — RIVASTIGMINE 1 PATCH: 4.6 PATCH, EXTENDED RELEASE TRANSDERMAL at 20:34

## 2021-01-09 RX ADMIN — Medication 1 TABLET(S): at 17:14

## 2021-01-09 RX ADMIN — HEPARIN SODIUM 5000 UNIT(S): 5000 INJECTION INTRAVENOUS; SUBCUTANEOUS at 17:14

## 2021-01-09 RX ADMIN — RIVASTIGMINE 1 PATCH: 4.6 PATCH, EXTENDED RELEASE TRANSDERMAL at 20:59

## 2021-01-09 RX ADMIN — Medication 50 MILLIGRAM(S): at 06:28

## 2021-01-09 RX ADMIN — Medication 1 TABLET(S): at 06:29

## 2021-01-09 NOTE — PROGRESS NOTE ADULT - SUBJECTIVE AND OBJECTIVE BOX
Patient is a 89y old  Male who presents with a chief complaint of shortness of breath, hypoxia (08 Jan 2021 09:16)      Patient seen and examined at bedside.    ALLERGIES:  No Known Allergies    MEDICATIONS  (STANDING):  amLODIPine   Tablet 5 milliGRAM(s) Oral daily  amoxicillin  875 milliGRAM(s)/clavulanate 1 Tablet(s) Oral two times a day  escitalopram 5 milliGRAM(s) Oral daily  heparin   Injectable 5000 Unit(s) SubCutaneous every 12 hours  memantine 10 milliGRAM(s) Oral daily  metoprolol succinate ER 50 milliGRAM(s) Oral daily  mirtazapine 30 milliGRAM(s) Oral daily  OLANZapine 5 milliGRAM(s) Oral daily  rivastigmine patch  9.5 mG/24 Hr(s). 1 Patch Transdermal every 24 hours    MEDICATIONS  (PRN):  acetaminophen  Suppository .. 650 milliGRAM(s) Rectal every 6 hours PRN Temp greater or equal to 38C (100.4F)  OLANZapine Injectable 2.5 milliGRAM(s) IntraMuscular daily PRN agitation    Vital Signs Last 24 Hrs  T(F): 98 (09 Jan 2021 06:00), Max: 99 (08 Jan 2021 08:00)  HR: 83 (09 Jan 2021 06:00) (74 - 83)  BP: 163/78 (09 Jan 2021 06:00) (140/71 - 163/78)  RR: 18 (09 Jan 2021 06:00) (18 - 18)  SpO2: 94% (09 Jan 2021 06:00) (93% - 94%)  I&O's Summary    08 Jan 2021 07:01  -  09 Jan 2021 07:00  --------------------------------------------------------  IN: 0 mL / OUT: 6 mL / NET: -6 mL      PHYSICAL EXAM:  General: NAD, A/O x 3  ENT: MMM  Neck: Supple, No JVD  Lungs: Clear to auscultation bilaterally  Cardio: RRR, S1/S2, No murmurs  Abdomen: Soft, Nontender, Nondistended; Bowel sounds present  Extremities: No calf tenderness, No pitting edema    LABS:                        11.5   10.66 )-----------( 197      ( 08 Jan 2021 08:29 )             35.3     01-08    142  |  112  |  25  ----------------------------<  87  3.8   |  18  |  1.90    Ca    9.9      08 Jan 2021 06:18      eGFR if Non African American: 30 mL/min/1.73M2 (01-08-21 @ 06:18)  eGFR if African American: 35 mL/min/1.73M2 (01-08-21 @ 06:18)        Culture - Blood (collected 04 Jan 2021 11:55)  Source: .Blood Blood-Peripheral  Preliminary Report (05 Jan 2021 17:02):    No growth to date.    Culture - Blood (collected 04 Jan 2021 11:55)  Source: .Blood Blood-Peripheral  Preliminary Report (05 Jan 2021 17:02):    No growth to date.        RADIOLOGY & ADDITIONAL TESTS:  cho< from: TTE Echo Complete w/o Contrast w/ Doppler (01.08.21 @ 11:57) >    Summary:   1. Left ventricular ejection fraction, by visual estimation, is 65 to 70%.   2. Normal global left ventricular systolic function.   3. Basal inferior segment is abnormal as described above.   4. Mildly increased LV wall thickness.   5. Normal left ventricular internal cavity size.   6. Spectral Doppler shows impaired relaxation pattern of left ventricular myocardial filling (Grade I diastolic dysfunction).   7. There is mild concentric left ventricular hypertrophy.   8. Mild thickening and calcification of the anterior and posterior mitral valve leaflets.   9. Trace mitral valve regurgitation.  10. Mild tricuspid regurgitation.  11. Sclerotic aortic valve with decreased opening.  12. Estimated pulmonary artery systolic pressure is 44.8 mmHg assuming a right atrial pressure of 10 mmHg, which is consistent with mild pulmonary hypertension.  13. There is mild aortic root calcification.      < end of copied text >    Care Discussed with Consultants/Other Providers:    Patient is a 89y old  Male who presents with a chief complaint of shortness of breath, hypoxia (08 Jan 2021 09:16)    Patient seen and examined at bedside.  No events overnight.    ALLERGIES:  No Known Allergies    MEDICATIONS  (STANDING):  amLODIPine   Tablet 5 milliGRAM(s) Oral daily  amoxicillin  875 milliGRAM(s)/clavulanate 1 Tablet(s) Oral two times a day  escitalopram 5 milliGRAM(s) Oral daily  heparin   Injectable 5000 Unit(s) SubCutaneous every 12 hours  memantine 10 milliGRAM(s) Oral daily  metoprolol succinate ER 50 milliGRAM(s) Oral daily  mirtazapine 30 milliGRAM(s) Oral daily  OLANZapine 5 milliGRAM(s) Oral daily  rivastigmine patch  9.5 mG/24 Hr(s). 1 Patch Transdermal every 24 hours    MEDICATIONS  (PRN):  acetaminophen  Suppository .. 650 milliGRAM(s) Rectal every 6 hours PRN Temp greater or equal to 38C (100.4F)  OLANZapine Injectable 2.5 milliGRAM(s) IntraMuscular daily PRN agitation    Vital Signs Last 24 Hrs  T(F): 98 (09 Jan 2021 06:00), Max: 99 (08 Jan 2021 08:00)  HR: 83 (09 Jan 2021 06:00) (74 - 83)  BP: 163/78 (09 Jan 2021 06:00) (140/71 - 163/78)  RR: 18 (09 Jan 2021 06:00) (18 - 18)  SpO2: 94% (09 Jan 2021 06:00) (93% - 94%)  I&O's Summary    08 Jan 2021 07:01  -  09 Jan 2021 07:00  --------------------------------------------------------  IN: 0 mL / OUT: 6 mL / NET: -6 mL      PHYSICAL EXAM:  General: NAD, sleepy, but arousable.  ENT: MMM, no thrush  Neck: Supple, No JVD  Lungs: non-labored breathing, decreased BS at bases, but poor inspiratory effort,  no w/r/r  Cardio: RRR, S1/S2, No murmurs  Abdomen: Soft, Nontender, Nondistended; Bowel sounds present  Extremities: No calf tenderness, No pitting edema  Neuro:  no focal deficits    LABS:                        11.5   10.66 )-----------( 197      ( 08 Jan 2021 08:29 )             35.3     01-08    142  |  112  |  25  ----------------------------<  87  3.8   |  18  |  1.90    Ca    9.9      08 Jan 2021 06:18      eGFR if Non African American: 30 mL/min/1.73M2 (01-08-21 @ 06:18)  eGFR if African American: 35 mL/min/1.73M2 (01-08-21 @ 06:18)        Culture - Blood (collected 04 Jan 2021 11:55)  Source: .Blood Blood-Peripheral  Preliminary Report (05 Jan 2021 17:02):    No growth to date.    Culture - Blood (collected 04 Jan 2021 11:55)  Source: .Blood Blood-Peripheral  Preliminary Report (05 Jan 2021 17:02):    No growth to date.        RADIOLOGY & ADDITIONAL TESTS:  cho< from: TTE Echo Complete w/o Contrast w/ Doppler (01.08.21 @ 11:57) >    Summary:   1. Left ventricular ejection fraction, by visual estimation, is 65 to 70%.   2. Normal global left ventricular systolic function.   3. Basal inferior segment is abnormal as described above.   4. Mildly increased LV wall thickness.   5. Normal left ventricular internal cavity size.   6. Spectral Doppler shows impaired relaxation pattern of left ventricular myocardial filling (Grade I diastolic dysfunction).   7. There is mild concentric left ventricular hypertrophy.   8. Mild thickening and calcification of the anterior and posterior mitral valve leaflets.   9. Trace mitral valve regurgitation.  10. Mild tricuspid regurgitation.  11. Sclerotic aortic valve with decreased opening.  12. Estimated pulmonary artery systolic pressure is 44.8 mmHg assuming a right atrial pressure of 10 mmHg, which is consistent with mild pulmonary hypertension.  13. There is mild aortic root calcification.      < end of copied text >    Care Discussed with Consultants/Other Providers:

## 2021-01-09 NOTE — PROGRESS NOTE ADULT - NUTRITIONAL ASSESSMENT
This patient has been assessed with a concern for Malnutrition and has been determined to have a diagnosis/diagnoses of Moderate protein-calorie malnutrition.    This patient is being managed with:   Diet Dysphagia 1 Pureed-Honey Consistency Fluid-  Entered: Jan 7 2021  8:04AM

## 2021-01-09 NOTE — PROGRESS NOTE ADULT - ASSESSMENT
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    # Severe sepsis; resolved  # Aspiration Pneumonia  # Acute hypoxic respiratory failure  # Lactic acidosis; resolved  -CXR with +RLL infiltrate; was on IV Ceftriaxone and Flagyl; now on oral Augmentin, day 3/5  -c/w oxygen supplementation; O2 sat on RA drops to 87%, will need home O2  -pt is DNR/DNI    # Advanced Dementia  - baseline A/Ox0  - continue Zyprexa, Lexapro, Memantine, Remeron, Exelon patch    # Hypertension   -continue Norvasc, Metoprolol    #Pulm HTN  -Echo on 1/8: mild pulm htn, EF 65>70%  -needs home O2 support    # EMILEE/CKD4  -secondary to sepsis/infection  -Creat downtrending; s/p IVF  -avoid nephrotoxins    # DVT Prophylaxis:  - Heparin    *daughter Jose Thompson [phone] updated.  D/C HOME TODAY IF OXYGEN IS DELIVERED TO THE HOME.    DNR/DNI/ No feeding tube 89y year old Male with Hx of 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    # Severe sepsis; resolved  # Aspiration Pneumonia  # Acute hypoxic respiratory failure  # Lactic acidosis; resolved  -CXR with +RLL infiltrate; completed 3 days of IV Ceftriaxone and Flagyl; now on oral Augmentin, day 3/5  -c/w oxygen supplementation; O2 sat on RA drops to 87%, will need home O2  -pt is DNR/DNI    # Advanced Dementia  - baseline A/Ox0  - continue Zyprexa, Lexapro, Memantine, Remeron, Exelon patch    # Hypertension   -continue Norvasc, Metoprolol    #Pulm HTN  -Echo on 1/8: mild pulm htn, EF 65>70%  -needs home O2 support    # EMILEE/CKD4  -secondary to sepsis/infection  -Creat downtrending; s/p IVF  -avoid nephrotoxins    # DVT Prophylaxis:  - Heparin    *daughter Jose Thompson [phone] updated.  D/C HOME TODAY IF OXYGEN IS DELIVERED TO THE HOME.    DNR/DNI/ No feeding tube

## 2021-01-10 ENCOUNTER — TRANSCRIPTION ENCOUNTER (OUTPATIENT)
Age: 86
End: 2021-01-10

## 2021-01-10 VITALS
TEMPERATURE: 98 F | RESPIRATION RATE: 16 BRPM | SYSTOLIC BLOOD PRESSURE: 121 MMHG | OXYGEN SATURATION: 92 % | HEART RATE: 101 BPM | DIASTOLIC BLOOD PRESSURE: 70 MMHG

## 2021-01-10 LAB
ANION GAP SERPL CALC-SCNC: 10 MMOL/L — SIGNIFICANT CHANGE UP (ref 5–17)
BUN SERPL-MCNC: 26 MG/DL — HIGH (ref 7–23)
CALCIUM SERPL-MCNC: 9.9 MG/DL — SIGNIFICANT CHANGE UP (ref 8.4–10.5)
CHLORIDE SERPL-SCNC: 110 MMOL/L — HIGH (ref 96–108)
CO2 SERPL-SCNC: 23 MMOL/L — SIGNIFICANT CHANGE UP (ref 22–31)
CREAT SERPL-MCNC: 1.99 MG/DL — HIGH (ref 0.5–1.3)
GLUCOSE SERPL-MCNC: 100 MG/DL — HIGH (ref 70–99)
HCT VFR BLD CALC: 37.2 % — LOW (ref 39–50)
HGB BLD-MCNC: 12.1 G/DL — LOW (ref 13–17)
MCHC RBC-ENTMCNC: 30.3 PG — SIGNIFICANT CHANGE UP (ref 27–34)
MCHC RBC-ENTMCNC: 32.5 GM/DL — SIGNIFICANT CHANGE UP (ref 32–36)
MCV RBC AUTO: 93.2 FL — SIGNIFICANT CHANGE UP (ref 80–100)
NRBC # BLD: 0 /100 WBCS — SIGNIFICANT CHANGE UP (ref 0–0)
PLATELET # BLD AUTO: 217 K/UL — SIGNIFICANT CHANGE UP (ref 150–400)
POTASSIUM SERPL-MCNC: 3.8 MMOL/L — SIGNIFICANT CHANGE UP (ref 3.5–5.3)
POTASSIUM SERPL-SCNC: 3.8 MMOL/L — SIGNIFICANT CHANGE UP (ref 3.5–5.3)
RBC # BLD: 3.99 M/UL — LOW (ref 4.2–5.8)
RBC # FLD: 13 % — SIGNIFICANT CHANGE UP (ref 10.3–14.5)
SODIUM SERPL-SCNC: 143 MMOL/L — SIGNIFICANT CHANGE UP (ref 135–145)
WBC # BLD: 10.69 K/UL — HIGH (ref 3.8–10.5)
WBC # FLD AUTO: 10.69 K/UL — HIGH (ref 3.8–10.5)

## 2021-01-10 PROCEDURE — 99239 HOSP IP/OBS DSCHRG MGMT >30: CPT

## 2021-01-10 RX ORDER — AMLODIPINE BESYLATE 2.5 MG/1
1 TABLET ORAL
Qty: 30 | Refills: 0
Start: 2021-01-10 | End: 2021-02-08

## 2021-01-10 RX ADMIN — HEPARIN SODIUM 5000 UNIT(S): 5000 INJECTION INTRAVENOUS; SUBCUTANEOUS at 05:58

## 2021-01-10 RX ADMIN — Medication 1 TABLET(S): at 05:58

## 2021-01-10 RX ADMIN — AMLODIPINE BESYLATE 5 MILLIGRAM(S): 2.5 TABLET ORAL at 05:58

## 2021-01-10 RX ADMIN — OLANZAPINE 5 MILLIGRAM(S): 15 TABLET, FILM COATED ORAL at 09:27

## 2021-01-10 RX ADMIN — ESCITALOPRAM OXALATE 5 MILLIGRAM(S): 10 TABLET, FILM COATED ORAL at 09:27

## 2021-01-10 RX ADMIN — Medication 50 MILLIGRAM(S): at 05:58

## 2021-01-10 RX ADMIN — RIVASTIGMINE 1 PATCH: 4.6 PATCH, EXTENDED RELEASE TRANSDERMAL at 05:48

## 2021-01-10 RX ADMIN — RIVASTIGMINE 1 PATCH: 4.6 PATCH, EXTENDED RELEASE TRANSDERMAL at 07:06

## 2021-01-10 RX ADMIN — MIRTAZAPINE 30 MILLIGRAM(S): 45 TABLET, ORALLY DISINTEGRATING ORAL at 09:27

## 2021-01-10 RX ADMIN — MEMANTINE HYDROCHLORIDE 10 MILLIGRAM(S): 10 TABLET ORAL at 09:27

## 2021-01-10 NOTE — PROGRESS NOTE ADULT - ATTENDING COMMENTS
Aspiration PNA  S/S eval appreciated; pt tolerating PO, dysphagia 1 puree honey thick diet  Switch Abx to Augmentin    O2 sats 87% on room air at rest    Follow up with ECHO and anticipating discharge home with O2
Pt with advance dementia pw ARF w/ Hypoxia secondary to Gram negative PNA concerning for Aspiration pneumonia as well  Continue Abx for now  SLP recs noted; Ice chips only. Baseline diet dysphagia 1 puree with honey thick   Since unable to take medications by mouth, will start Zyprexa 2.5mg prn for agitation     poor prognosis  will need ongoing GOC conversation with family
I have personally seen and examined patient on the above date.  I discussed the case with Eduarda Jimenez (PA)  and I agree with findings and plan as detailed per note above, which I have amended where appropriate.    D/C planning once home concentrator for O2 is sent
I have personally seen and examined patient on the above date.  I discussed the case with Eduarda Jimenez (PA)  and I agree with findings and plan as detailed per note above, which I have amended where appropriate.    D/C today
I have personally seen and examined patient on the above date.  I discussed the case with Eduarda Jimenez (PA)  and I agree with findings and plan as detailed per note above, which I have amended where appropriate.    Patient for D/C with O2, TTE today. D/C planning
(2) well flexed

## 2021-01-10 NOTE — PROGRESS NOTE ADULT - NUTRITIONAL ASSESSMENT
This patient has been assessed with a concern for Malnutrition and has been determined to have a diagnosis/diagnoses of Moderate protein-calorie malnutrition.    This patient is being managed with:   Diet Dysphagia 1 Pureed-Honey Consistency Fluid-  Entered: Jan 7 2021  8:04AM     Normal vision: sees adequately in most situations; can see medication labels, newsprint

## 2021-01-10 NOTE — DISCHARGE NOTE NURSING/CASE MANAGEMENT/SOCIAL WORK - PATIENT PORTAL LINK FT
You can access the FollowMyHealth Patient Portal offered by [facility] by registering at the following website: [email or web address]. By joining [email or web address]’s FollowMyHealth portal, you will also be able to view your health information using other applications (apps) compatible with our system.

## 2021-01-10 NOTE — PROGRESS NOTE ADULT - REASON FOR ADMISSION
shortness of breath, hypoxia
No significant past surgical history

## 2021-01-10 NOTE — PROGRESS NOTE ADULT - ASSESSMENT
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    # Severe sepsis; resolved  # Aspiration Pneumonia  # Acute hypoxic respiratory failure  # Lactic acidosis; resolved  -CXR with +RLL infiltrate; completed 3 days of IV Ceftriaxone and Flagyl; now on oral Augmentin, day 4/5  -c/w oxygen supplementation; O2 sat on RA drops to 87%, will need home O2  -pt is DNR/DNI    # Advanced Dementia  - baseline A/Ox0  - continue Zyprexa, Lexapro, Memantine, Remeron, Exelon patch    # Hypertension   -continue Norvasc, Metoprolol    #Pulm HTN  -Echo on 1/8: mild pulm htn, EF 65>70%  -needs home O2 support    # EMILEE/CKD4  -secondary to sepsis/infection  -Creat downtrending; s/p IVF  -avoid nephrotoxins    # DVT Prophylaxis:  - Heparin    *daughter Jose Thompson [phone] updated.  D/C HOME TODAY IF OXYGEN IS DELIVERED TO THE HOME.    DNR/DNI/ No feeding tube 89y year old Male with Hx of 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    # Severe sepsis; resolved  # Aspiration Pneumonia  # Acute hypoxic respiratory failure  # Lactic acidosis; resolved  -CXR with +RLL infiltrate; completed 3 days of IV Ceftriaxone and Flagyl; now on oral Augmentin, day 4/5  -c/w oxygen supplementation; O2 sat on RA drops to 87%, will need home O2  -pt is DNR/DNI    # Advanced Dementia  - baseline A/Ox0  - continue Zyprexa, Lexapro, Memantine, Remeron, Exelon patch    # Hypertension   -continue Norvasc, Metoprolol    #Pulm HTN  -Echo on 1/8: mild pulm htn, EF 65>70%  -needs home O2 support    # EMILEE/CKD4  -secondary to sepsis/infection  -Creat downtrending; s/p IVF  -avoid nephrotoxins    # DVT Prophylaxis:  - Heparin    *spoke with daughter Jose Thompson [phone] updated.  D/C HOME TODAY with home O2, Aides in place.  DNR/DNI/ No feeding tube 89y year old Male with Hx of 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    # Severe sepsis; resolved  # Aspiration Pneumonia  # Acute hypoxic respiratory failure  # Lactic acidosis; resolved  -CXR with +RLL infiltrate; completed 3 days of IV Ceftriaxone and Flagyl; now on oral Augmentin, day 4/5  -c/w oxygen supplementation; O2 sat on RA drops to 87%, will need home O2  -pt is DNR/DNI    # Advanced Dementia  - baseline A/Ox0  - continue Zyprexa, Lexapro, Memantine, Remeron, Exelon patch    # Hypertension   -continue Norvasc, Metoprolol    #Pulm HTN  -Echo on 1/8: mild pulm htn, EF 65>70%  -needs home O2 support    # EMILEE/CKD4  -secondary to sepsis/infection  -Creat downtrending; s/p IVF  -avoid nephrotoxins    # DVT Prophylaxis:  - Heparin    *spoke with daughter Jose Thompson [phone] updated.  D/C HOME TODAY with home O2, Aides in place.  DNR/DNI/ No feeding tube    Patient is medically stable for discharge home  Time spent on D/C - 33 mins

## 2021-01-10 NOTE — DISCHARGE NOTE PROVIDER - NSDCMRMEDTOKEN_GEN_ALL_CORE_FT
citalopram 10 mg oral tablet: 1 tab(s) orally once a day  memantine 10 mg oral tablet: 1 tab(s) orally 2 times a day  metoprolol succinate 50 mg oral tablet, extended release: 1 tab(s) orally once a day  mirtazapine 30 mg oral tablet: 1 tab(s) orally once a day (at bedtime)  OLANZapine 5 mg oral tablet: 1 tab(s) orally once a day  rivastigmine 9.5 mg/24 hr transdermal film, extended release: 1 patch transdermal every 24 hours   amLODIPine 5 mg oral tablet: 1 tab(s) orally once a day  amoxicillin-clavulanate 875 mg-125 mg oral tablet: 1 tab(s) orally 2 times a day  citalopram 10 mg oral tablet: 1 tab(s) orally once a day  memantine 10 mg oral tablet: 1 tab(s) orally 2 times a day  metoprolol succinate 50 mg oral tablet, extended release: 1 tab(s) orally once a day  mirtazapine 30 mg oral tablet: 1 tab(s) orally once a day (at bedtime)  OLANZapine 5 mg oral tablet: 1 tab(s) orally once a day  rivastigmine 9.5 mg/24 hr transdermal film, extended release: 1 patch transdermal every 24 hours

## 2021-01-10 NOTE — PROGRESS NOTE ADULT - SUBJECTIVE AND OBJECTIVE BOX
Patient is a 89y old  Male who presents with a chief complaint of shortness of breath, hypoxia (10 Maxi 2021 07:38)    Patient seen and examined at bedside.  Pt. with no events overnight.    ALLERGIES:  No Known Allergies    MEDICATIONS  (STANDING):  amLODIPine   Tablet 5 milliGRAM(s) Oral daily  amoxicillin  875 milliGRAM(s)/clavulanate 1 Tablet(s) Oral two times a day  escitalopram 5 milliGRAM(s) Oral daily  heparin   Injectable 5000 Unit(s) SubCutaneous every 12 hours  memantine 10 milliGRAM(s) Oral daily  metoprolol succinate ER 50 milliGRAM(s) Oral daily  mirtazapine 30 milliGRAM(s) Oral daily  OLANZapine 5 milliGRAM(s) Oral daily  potassium chloride    Tablet ER 20 milliEquivalent(s) Oral once  rivastigmine patch  9.5 mG/24 Hr(s). 1 Patch Transdermal every 24 hours    MEDICATIONS  (PRN):  acetaminophen  Suppository .. 650 milliGRAM(s) Rectal every 6 hours PRN Temp greater or equal to 38C (100.4F)  OLANZapine Injectable 2.5 milliGRAM(s) IntraMuscular daily PRN agitation    Vital Signs Last 24 Hrs  T(F): 97.9 (10 Maxi 2021 05:00), Max: 98.4 (09 Jan 2021 16:14)  HR: 77 (10 Maxi 2021 05:00) (69 - 79)  BP: 155/75 (10 Maxi 2021 05:00) (150/76 - 162/89)  RR: 16 (10 Maxi 2021 05:00) (16 - 16)  SpO2: 93% (10 Maxi 2021 05:00) (93% - 94%)  I&O's Summary    09 Jan 2021 07:01  -  10 Maxi 2021 07:00  --------------------------------------------------------  IN: 100 mL / OUT: 2 mL / NET: 98 mL      PHYSICAL EXAM:  General: NAD, sleepy.  ENT: MMM, no thrush  Neck: Supple, No JVD  Lungs: non-labored breathing, decreased BS at bases, no w/r/r  Cardio: RRR, S1/S2, No murmurs  Abdomen: Soft, Nontender, Nondistended; Bowel sounds present  Extremities: No calf tenderness, No pitting edema  Neuro:  arousable, no focal deficits    LABS:                        12.1   10.69 )-----------( 217      ( 10 Maxi 2021 06:30 )             37.2     01-10    143  |  110  |  26  ----------------------------<  100  3.8   |  23  |  1.99    Ca    9.9      10 Maxi 2021 06:30      eGFR if Non African American: 28 mL/min/1.73M2 (01-10-21 @ 06:30)  eGFR if African American: 33 mL/min/1.73M2 (01-10-21 @ 06:30)      Culture - Blood (collected 04 Jan 2021 11:55)  Source: .Blood Blood-Peripheral  Final Report (09 Jan 2021 17:01):    No Growth Final    Culture - Blood (collected 04 Jan 2021 11:55)  Source: .Blood Blood-Peripheral  Final Report (09 Jan 2021 17:01):    No Growth Final        RADIOLOGY & ADDITIONAL TESTS:    Care Discussed with Consultants/Other Providers:

## 2021-01-10 NOTE — DISCHARGE NOTE PROVIDER - CARE PROVIDER_API CALL
Flex Cutler)  Medicine  [address], [place] [address]  Phone: [phone]  Fax: [phone]  Follow Up Time:

## 2021-01-10 NOTE — DISCHARGE NOTE PROVIDER - NSDCCPCAREPLAN_GEN_ALL_CORE_FT
PRINCIPAL DISCHARGE DIAGNOSIS  Diagnosis: Aspiration pneumonia of right lower lobe, unspecified aspiration pneumonia type  Assessment and Plan of Treatment: you had an infection in your lungs, complete full course of antibiotics       PRINCIPAL DISCHARGE DIAGNOSIS  Diagnosis: Aspiration pneumonia of right lower lobe, unspecified aspiration pneumonia type  Assessment and Plan of Treatment: -you had an infection in your lungs, complete full course of antibiotics (10 days)  -if you have worsening cough, shortness of breath, high fevers call your MD immediately or come to the ED      SECONDARY DISCHARGE DIAGNOSES  Diagnosis: Dementia  Assessment and Plan of Treatment: continue home medications

## 2021-01-10 NOTE — DISCHARGE NOTE PROVIDER - INSTRUCTIONS
Strict Aspiration Precautions  Puree diet with honey thick liquids recommended and always feed with assistance

## 2021-01-10 NOTE — DISCHARGE NOTE PROVIDER - DETAILS OF MALNUTRITION DIAGNOSIS/DIAGNOSES
This patient has been assessed with a concern for Malnutrition and was treated during this hospitalization for the following Nutrition diagnosis/diagnoses:     -  01/08/2021: Moderate protein-calorie malnutrition   This patient has been assessed with a concern for Malnutrition and was treated during this hospitalization for the following Nutrition diagnosis/diagnoses:     -  01/08/2021: Moderate protein-calorie malnutrition    This patient has been assessed with a concern for Malnutrition and was treated during this hospitalization for the following Nutrition diagnosis/diagnoses:     -  01/08/2021: Moderate protein-calorie malnutrition   This patient has been assessed with a concern for Malnutrition and was treated during this hospitalization for the following Nutrition diagnosis/diagnoses:     -  01/08/2021: Moderate protein-calorie malnutrition    This patient has been assessed with a concern for Malnutrition and was treated during this hospitalization for the following Nutrition diagnosis/diagnoses:     -  01/08/2021: Moderate protein-calorie malnutrition    This patient has been assessed with a concern for Malnutrition and was treated during this hospitalization for the following Nutrition diagnosis/diagnoses:     -  01/08/2021: Moderate protein-calorie malnutrition

## 2021-01-10 NOTE — DISCHARGE NOTE PROVIDER - HOSPITAL COURSE
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  He was admitted with Severe sepsis; secondary to Aspiration Pneumonia.  Also with Acute hypoxic respiratory failure and Lactic acidosis.  CXR with +RLL infiltrate; started on IV Ceftriaxone and Flagyl initially and switched to oral Augmentin.  He did require oxygen supplementation; O2 sat on RA drops to 87%.  Pt. with advanced Dementia, he continued his home medications.  Pt. with EMILEE, on CKD4; probably secondary to sepsis.  He did improve after IVF.      Source of Infection:  PNA  Antibiotic / Last Day:    Palliative Care / Advanced Care Planning  Code Status:  DNR/DNI, NO feeding tubes    Discharging Provider:  RICKY Howard  Contact Info: Cell [phone] - Please call with any questions or concerns.    Outpatient Provider:  Dr. 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  He was admitted with Severe sepsis; secondary to Aspiration Pneumonia.  Also with Acute hypoxic respiratory failure and Lactic acidosis.  CXR with +RLL infiltrate; started on IV Ceftriaxone and Flagyl initially and switched to oral Augmentin.  He did require oxygen supplementation; O2 sat on RA drops to 87%.  Pt. with advanced Dementia, he continued his home medications.  Pt. with EMILEE, on CKD4; probably secondary to sepsis.  He did improve after IVF.      Source of Infection:  PNA  Antibiotic / Last Day:  1/13/21    Palliative Care / Advanced Care Planning  Code Status:  DNR/DNI, NO feeding tubes    Discharging Provider:  RICKY Howard  Contact Info: Cell [phone] - Please call with any questions or concerns.    Outpatient Provider:  Dr. 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  He was admitted with Severe sepsis; secondary to Aspiration Pneumonia.  Also with Acute hypoxic respiratory failure and Lactic acidosis.  CXR with +RLL infiltrate; started on IV Ceftriaxone and Flagyl initially and switched to oral Augmentin.  He did require oxygen supplementation; O2 sat on RA drops to 87%.  Pt. with advanced Dementia, he continued his home medications.  Pt. with EMILEE, on CKD4; probably secondary to sepsis.  He did improve after IVF.       Source of Infection:  PNA  Antibiotic / Last Day:  1/13/21    Palliative Care / Advanced Care Planning  Code Status:  DNR/DNI, NO feeding tubes    Discharging Provider:  RICKY oHward  Contact Info: Cell [phone] - Please call with any questions or concerns.    Outpatient Provider:  Dr. Cutler, notified of d/c

## 2021-01-19 ENCOUNTER — APPOINTMENT (OUTPATIENT)
Dept: FAMILY MEDICINE | Facility: HOME HEALTH | Age: 86
End: 2021-01-19
Payer: MEDICARE

## 2021-01-19 PROCEDURE — 99495 TRANSJ CARE MGMT MOD F2F 14D: CPT

## 2021-01-21 PROCEDURE — U0003: CPT

## 2021-01-21 PROCEDURE — 93306 TTE W/DOPPLER COMPLETE: CPT

## 2021-01-21 PROCEDURE — 80053 COMPREHEN METABOLIC PANEL: CPT

## 2021-01-21 PROCEDURE — U0005: CPT

## 2021-01-21 PROCEDURE — 92526 ORAL FUNCTION THERAPY: CPT

## 2021-01-21 PROCEDURE — 36415 COLL VENOUS BLD VENIPUNCTURE: CPT

## 2021-01-21 PROCEDURE — 86769 SARS-COV-2 COVID-19 ANTIBODY: CPT

## 2021-01-21 PROCEDURE — 92610 EVALUATE SWALLOWING FUNCTION: CPT

## 2021-01-21 PROCEDURE — 82803 BLOOD GASES ANY COMBINATION: CPT

## 2021-01-21 PROCEDURE — 71045 X-RAY EXAM CHEST 1 VIEW: CPT

## 2021-01-21 PROCEDURE — 87040 BLOOD CULTURE FOR BACTERIA: CPT

## 2021-01-21 PROCEDURE — 96365 THER/PROPH/DIAG IV INF INIT: CPT

## 2021-01-21 PROCEDURE — 96367 TX/PROPH/DG ADDL SEQ IV INF: CPT

## 2021-01-21 PROCEDURE — 83605 ASSAY OF LACTIC ACID: CPT

## 2021-01-21 PROCEDURE — 99285 EMERGENCY DEPT VISIT HI MDM: CPT | Mod: 25

## 2021-01-21 PROCEDURE — 93005 ELECTROCARDIOGRAM TRACING: CPT

## 2021-01-21 PROCEDURE — 85025 COMPLETE CBC W/AUTO DIFF WBC: CPT

## 2021-01-21 PROCEDURE — 85027 COMPLETE CBC AUTOMATED: CPT

## 2021-01-21 PROCEDURE — 85730 THROMBOPLASTIN TIME PARTIAL: CPT

## 2021-01-21 PROCEDURE — 85610 PROTHROMBIN TIME: CPT

## 2021-01-21 PROCEDURE — 80048 BASIC METABOLIC PNL TOTAL CA: CPT

## 2021-02-05 ENCOUNTER — RX RENEWAL (OUTPATIENT)
Age: 86
End: 2021-02-05

## 2021-03-04 ENCOUNTER — RX RENEWAL (OUTPATIENT)
Age: 86
End: 2021-03-04

## 2021-03-15 ENCOUNTER — RX RENEWAL (OUTPATIENT)
Age: 86
End: 2021-03-15

## 2021-03-23 ENCOUNTER — APPOINTMENT (OUTPATIENT)
Dept: FAMILY MEDICINE | Facility: HOME HEALTH | Age: 86
End: 2021-03-23
Payer: MEDICARE

## 2021-03-23 PROCEDURE — 99349 HOME/RES VST EST MOD MDM 40: CPT

## 2021-04-20 ENCOUNTER — TRANSCRIPTION ENCOUNTER (OUTPATIENT)
Age: 86
End: 2021-04-20

## 2021-04-29 ENCOUNTER — RX RENEWAL (OUTPATIENT)
Age: 86
End: 2021-04-29

## 2021-04-29 RX ORDER — NYSTATIN 100000 [USP'U]/G
100000 CREAM TOPICAL TWICE DAILY
Qty: 60 | Refills: 5 | Status: COMPLETED | COMMUNITY
Start: 2019-09-12 | End: 2021-04-29

## 2021-05-29 ENCOUNTER — RX RENEWAL (OUTPATIENT)
Age: 86
End: 2021-05-29

## 2021-05-31 ENCOUNTER — TRANSCRIPTION ENCOUNTER (OUTPATIENT)
Age: 86
End: 2021-05-31

## 2021-06-08 ENCOUNTER — APPOINTMENT (OUTPATIENT)
Dept: FAMILY MEDICINE | Facility: HOME HEALTH | Age: 86
End: 2021-06-08
Payer: MEDICARE

## 2021-06-08 PROCEDURE — 99350 HOME/RES VST EST HIGH MDM 60: CPT

## 2021-06-09 VITALS
RESPIRATION RATE: 16 BRPM | OXYGEN SATURATION: 95 % | TEMPERATURE: 98 F | DIASTOLIC BLOOD PRESSURE: 70 MMHG | HEART RATE: 76 BPM | SYSTOLIC BLOOD PRESSURE: 129 MMHG

## 2021-06-09 NOTE — HEALTH RISK ASSESSMENT
[No] : In the past 12 months have you used drugs other than those required for medical reasons? No [Any fall with injury in past year] : Patient reported fall with injury in the past year [(PHQ-2) Unable to screen] : PHQ-2: unable to screen [Low Salt Diet] : low salt [General Adherence] : general adherence [Language] : difficulty with language [Behavior] : difficulty with behavior [Learning/Retaining New Information] : difficulty learning/retaining new information [Handling Complex Tasks] : difficulty handling complex tasks [Reasoning] : difficulty with reasoning [Spatial Ability and Orientation] : difficulty with spatial ability and orientation [None] : None [With Family] : lives with family [Retired] : retired [] :  [Some assistance needed] : feeding [Full assistance needed] : managing finances [Reviewed no changes] : Reviewed no changes [Last Verification Date: ___] : Last Verification Date: [unfilled] [] : No [Audit-CScore] : 0 [UnableToScreenReason] : Advanced Alzheimer's disease [FreeTextEntry1] : Thick-It [Change in mental status noted] : No change in mental status noted [de-identified] : Advanced dementia, patient is awake alert and oriented x1 [de-identified] : Patient also has 24-hour aide [AdvancecareDate] : 06/21

## 2021-06-09 NOTE — COUNSELING
[Fall prevention counseling provided] : Fall prevention counseling provided [Adequate lighting] : Adequate lighting [No throw rugs] : No throw rugs [Use proper foot wear] : Use proper foot wear [Use recommended devices] : Use recommended devices [FreeTextEntry1] : Due to patient's advanced Alzheimer's he truly does not know how to walk with a walker or cane the assistance is given by his aide who guides him. [Behavioral health counseling provided] : Behavioral health counseling provided [Sleep ___ hours/day] : Sleep [unfilled] hours/day [Engage in a relaxing activity] : Engage in a relaxing activity [AUDIT-C Screening administered and reviewed] : AUDIT-C Screening administered and reviewed [Limited decision making ability] : Limited decision making ability

## 2021-06-09 NOTE — PHYSICAL EXAM
[No Acute Distress] : no acute distress [Well Nourished] : well nourished [Well Developed] : well developed [Well-Appearing] : well-appearing [Normal Sclera/Conjunctiva] : normal sclera/conjunctiva [PERRL] : pupils equal round and reactive to light [EOMI] : extraocular movements intact [Normal Outer Ear/Nose] : the outer ears and nose were normal in appearance [Normal Oropharynx] : the oropharynx was normal [No JVD] : no jugular venous distention [Supple] : supple [No Lymphadenopathy] : no lymphadenopathy [Thyroid Normal, No Nodules] : the thyroid was normal and there were no nodules present [No Respiratory Distress] : no respiratory distress  [Clear to Auscultation] : lungs were clear to auscultation bilaterally [No Accessory Muscle Use] : no accessory muscle use [Normal Rate] : normal rate  [Regular Rhythm] : with a regular rhythm [Normal S1, S2] : normal S1 and S2 [No Murmur] : no murmur heard [No Carotid Bruits] : no carotid bruits [No Abdominal Bruit] : a ~M bruit was not heard ~T in the abdomen [No Varicosities] : no varicosities [Pedal Pulses Present] : the pedal pulses are present [No Edema] : there was no peripheral edema [No Extremity Clubbing/Cyanosis] : no extremity clubbing/cyanosis [No Palpable Aorta] : no palpable aorta [Soft] : abdomen soft [Non Tender] : non-tender [Non-distended] : non-distended [No Masses] : no abdominal mass palpated [No HSM] : no HSM [Normal Bowel Sounds] : normal bowel sounds [Normal Posterior Cervical Nodes] : no posterior cervical lymphadenopathy [Normal Anterior Cervical Nodes] : no anterior cervical lymphadenopathy [No CVA Tenderness] : no CVA  tenderness [No Spinal Tenderness] : no spinal tenderness [No Joint Swelling] : no joint swelling [No Rash] : no rash [de-identified] : Shuffling gait, expressionless [de-identified] : Fine tremor. No cogwheel rigidity. No pill-rolling,Unsteady gait [de-identified] : advanced Alzheimer,no change

## 2021-06-09 NOTE — ASSESSMENT
[FreeTextEntry1] : Assessment and plan:\par \par 1.  Syncope on multiple episodes it appears that the patient has orthostatic hypotension his blood pressure when first taken was 129/70 sitting subsequently with assistance we had patient stand and I checked his blood pressure it dropped to 100/60 while standing patient showed no sign of loss of consciousness or worsening unsteadiness.  Unfortunately the patient is continuously unsteady which is not new for him.\par \par 2.  Orthostatic hypotension had a long and detailed discussion with patient's caregiver and daughter I would prefer not starting more medications for orthostatic hypotension I instructed them on allow the patient to change positions slowly going from a laying down to a sitting position allow him to sit at the edge of the bed for several seconds and then allow him to stand without walking and then slowly continue activity.  The patient's aide is capable of checking patient's blood pressure therefore I recommend taking orthostatics.\par \par 3.  Advanced Alzheimer's which has been worsening and at times behavioral problems I recommend we continue present medical management patient appears to be tolerating the medications and according to the aide has been sleeping better.\par \par 4.  Patient has a parkinsonian-like presentation the family prefers not adding any further medications.  I agree with that patient also has moltz visibly posted.\par \par 5.  Continue citalopram, Namenda, olanzapine and rivastigmine.\par \par 6.  For the patient's hypertension I do recommend continuing metoprolol at present dose 25 mg twice a day keeping a close eye on patient's blood pressure and checking for orthostatics.\par \par 7.  Insomnia when that occurs mirtazapine only as needed.\par \par 8.  I recommend home blood draw which I will put orders in today\par \par Total time spent on and with patient 60 minutes.

## 2021-06-09 NOTE — HISTORY OF PRESENT ILLNESS
[FreeTextEntry2] : Home visit has been requested by patient's daughter.  Patient is a 89-year-old gentleman with multiple medical problems and has had multiple episodes of syncope.  EMS has been called to the home patient's daughter prefers patient be cared for at home and subsequently they declined transport to the emergency room.  Medical history is significant for Alzheimer's disease, hyperlipidemia, hypertension, stage III chronic kidney disease and at today's visit patient did have orthostatic hypotension.

## 2021-06-16 LAB
ALBUMIN SERPL ELPH-MCNC: 3.9 G/DL
ALP BLD-CCNC: 91 U/L
ALT SERPL-CCNC: 13 U/L
ANION GAP SERPL CALC-SCNC: 16 MMOL/L
AST SERPL-CCNC: 20 U/L
BASOPHILS # BLD AUTO: 0.03 K/UL
BASOPHILS NFR BLD AUTO: 0.3 %
BILIRUB SERPL-MCNC: 0.4 MG/DL
BUN SERPL-MCNC: 32 MG/DL
CALCIUM SERPL-MCNC: 10.3 MG/DL
CHLORIDE SERPL-SCNC: 107 MMOL/L
CHOLEST SERPL-MCNC: 145 MG/DL
CO2 SERPL-SCNC: 19 MMOL/L
CREAT SERPL-MCNC: 2.35 MG/DL
EOSINOPHIL # BLD AUTO: 0.19 K/UL
EOSINOPHIL NFR BLD AUTO: 2.2 %
ESTIMATED AVERAGE GLUCOSE: 108 MG/DL
GLUCOSE SERPL-MCNC: 80 MG/DL
HBA1C MFR BLD HPLC: 5.4 %
HCT VFR BLD CALC: 40 %
HDLC SERPL-MCNC: 42 MG/DL
HGB BLD-MCNC: 12.9 G/DL
IMM GRANULOCYTES NFR BLD AUTO: 0.6 %
LDLC SERPL CALC-MCNC: 78 MG/DL
LYMPHOCYTES # BLD AUTO: 2.23 K/UL
LYMPHOCYTES NFR BLD AUTO: 25.4 %
MAN DIFF?: NORMAL
MCHC RBC-ENTMCNC: 31.8 PG
MCHC RBC-ENTMCNC: 32.3 GM/DL
MCV RBC AUTO: 98.5 FL
MONOCYTES # BLD AUTO: 0.69 K/UL
MONOCYTES NFR BLD AUTO: 7.9 %
NEUTROPHILS # BLD AUTO: 5.58 K/UL
NEUTROPHILS NFR BLD AUTO: 63.6 %
NONHDLC SERPL-MCNC: 103 MG/DL
PLATELET # BLD AUTO: 201 K/UL
POTASSIUM SERPL-SCNC: 4.4 MMOL/L
PROT SERPL-MCNC: 6.2 G/DL
PSA SERPL-MCNC: 2.33 NG/ML
RBC # BLD: 4.06 M/UL
RBC # FLD: 13.3 %
SODIUM SERPL-SCNC: 142 MMOL/L
TRIGL SERPL-MCNC: 128 MG/DL
TSH SERPL-ACNC: 3.16 UIU/ML
WBC # FLD AUTO: 8.77 K/UL

## 2021-06-27 ENCOUNTER — RX RENEWAL (OUTPATIENT)
Age: 86
End: 2021-06-27

## 2021-06-28 ENCOUNTER — RX RENEWAL (OUTPATIENT)
Age: 86
End: 2021-06-28

## 2021-06-28 RX ORDER — MIRTAZAPINE 45 MG/1
45 TABLET, FILM COATED ORAL
Qty: 90 | Refills: 3 | Status: ACTIVE | COMMUNITY
Start: 2021-06-28 | End: 1900-01-01

## 2021-08-30 ENCOUNTER — RX RENEWAL (OUTPATIENT)
Age: 86
End: 2021-08-30

## 2021-09-07 ENCOUNTER — APPOINTMENT (OUTPATIENT)
Dept: FAMILY MEDICINE | Facility: HOME HEALTH | Age: 86
End: 2021-09-07
Payer: MEDICARE

## 2021-09-07 DIAGNOSIS — I10 ESSENTIAL (PRIMARY) HYPERTENSION: ICD-10-CM

## 2021-09-07 DIAGNOSIS — R55 SYNCOPE AND COLLAPSE: ICD-10-CM

## 2021-09-07 DIAGNOSIS — I95.1 ORTHOSTATIC HYPOTENSION: ICD-10-CM

## 2021-09-07 PROCEDURE — 99349 HOME/RES VST EST MOD MDM 40: CPT

## 2021-09-08 VITALS
SYSTOLIC BLOOD PRESSURE: 120 MMHG | TEMPERATURE: 98 F | DIASTOLIC BLOOD PRESSURE: 70 MMHG | OXYGEN SATURATION: 94 % | RESPIRATION RATE: 14 BRPM | HEART RATE: 72 BPM

## 2021-09-08 PROBLEM — I95.1 ORTHOSTATIC HYPOTENSION: Status: ACTIVE | Noted: 2021-06-09

## 2021-09-08 PROBLEM — R55 SYNCOPE AND COLLAPSE: Status: ACTIVE | Noted: 2021-06-09

## 2021-09-08 NOTE — HISTORY OF PRESENT ILLNESS
[FreeTextEntry2] : Home visit was requested by patient's daughter.  She states for checkup evaluation of patient patient is basically homebound due to advanced Alzheimer's disease.  Medical history is significant for syncope and collapse he has had no witnessed syncopal episodes but has been found laying down on the floor with no apparent trauma.  History is significant for orthostatic hypotension, Alzheimer's, hypertension, hyperlipidemia and stage III renal insufficiency which has been stable.

## 2021-09-08 NOTE — ASSESSMENT
[FreeTextEntry1] : Assessment and plan:\par \par 1.  Syncope/collapse no further episodes since last hospitalization.  Patient has been found laying down on the floor with no apparent trauma it appears that he just laid down on the floor.  Strict fall precautions discussed with family patient's son-in-law and personal aide who was present.\par \par 2.  Orthostatic hypotension today's blood pressure is well controlled and stable no orthostasis recorded.\par \par 3.  Advanced Alzheimer's continue present medical management no change.\par \par 4.  Hypertension continue present medical management with metoprolol 25 mg twice a day\par \par 5.  Hyperlipidemia I do not recommend starting any medications just a low-fat low-cholesterol diet.\par \par 6.  Comprehensive blood work reviewed with patient's family.

## 2021-09-08 NOTE — HEALTH RISK ASSESSMENT
[] : No [No] : In the past 12 months have you used drugs other than those required for medical reasons? No [Any fall with injury in past year] : Patient reported fall with injury in the past year [Medical reason not done] : Medical reason not done [Audit-CScore] : 0 [de-identified] : Advanced Alzheimer's [Low Fat Diet] : low fat [Low Salt Diet] : low salt [Low Protein Diet] : low protein [General Adherence] : general adherence [Change in mental status noted] : No change in mental status noted [Language] : difficulty with language [Behavior] : difficulty with behavior [Learning/Retaining New Information] : difficulty learning/retaining new information [Handling Complex Tasks] : difficulty handling complex tasks [Reasoning] : difficulty with reasoning [Spatial Ability and Orientation] : difficulty with spatial ability and orientation [None] : None [With Family] : lives with family [Retired] : retired [Less Than High School] : less than high school [] :  [Sexually Active] : not sexually active [High Risk Behavior] : no high risk behavior [Some assistance needed] : walking [Full assistance needed] : managing finances [Reports changes in hearing] : Reports no changes in hearing

## 2021-09-08 NOTE — PHYSICAL EXAM
[No Acute Distress] : no acute distress [Well Nourished] : well nourished [Well Developed] : well developed [Well-Appearing] : well-appearing [Normal Sclera/Conjunctiva] : normal sclera/conjunctiva [PERRL] : pupils equal round and reactive to light [EOMI] : extraocular movements intact [Normal Outer Ear/Nose] : the outer ears and nose were normal in appearance [Normal Oropharynx] : the oropharynx was normal [No JVD] : no jugular venous distention [Supple] : supple [No Lymphadenopathy] : no lymphadenopathy [Thyroid Normal, No Nodules] : the thyroid was normal and there were no nodules present [No Respiratory Distress] : no respiratory distress  [Clear to Auscultation] : lungs were clear to auscultation bilaterally [No Accessory Muscle Use] : no accessory muscle use [Normal Rate] : normal rate  [Regular Rhythm] : with a regular rhythm [Normal S1, S2] : normal S1 and S2 [No Murmur] : no murmur heard [No Carotid Bruits] : no carotid bruits [No Abdominal Bruit] : a ~M bruit was not heard ~T in the abdomen [No Varicosities] : no varicosities [Pedal Pulses Present] : the pedal pulses are present [No Edema] : there was no peripheral edema [No Extremity Clubbing/Cyanosis] : no extremity clubbing/cyanosis [No Palpable Aorta] : no palpable aorta [Soft] : abdomen soft [Non Tender] : non-tender [Non-distended] : non-distended [No Masses] : no abdominal mass palpated [No HSM] : no HSM [Normal Bowel Sounds] : normal bowel sounds [Normal Posterior Cervical Nodes] : no posterior cervical lymphadenopathy [Normal Anterior Cervical Nodes] : no anterior cervical lymphadenopathy [No CVA Tenderness] : no CVA  tenderness [No Spinal Tenderness] : no spinal tenderness [No Joint Swelling] : no joint swelling [No Rash] : no rash [de-identified] : Shuffling gait, expressionless [de-identified] : Fine tremor. No cogwheel rigidity. No pill-rolling,Unsteady gait [de-identified] : advanced Alzheimer,no change

## 2021-09-08 NOTE — COUNSELING
[Fall prevention counseling provided] : Fall prevention counseling provided [Adequate lighting] : Adequate lighting [No throw rugs] : No throw rugs [Use proper foot wear] : Use proper foot wear [Use recommended devices] : Use recommended devices [Behavioral health counseling provided] : Behavioral health counseling provided [Sleep ___ hours/day] : Sleep [unfilled] hours/day [Engage in a relaxing activity] : Engage in a relaxing activity [Plan in advance] : Plan in advance [AUDIT-C Screening administered and reviewed] : AUDIT-C Screening administered and reviewed [Limited decision making ability] : Limited decision making ability

## 2021-09-30 ENCOUNTER — RX RENEWAL (OUTPATIENT)
Age: 86
End: 2021-09-30

## 2021-09-30 RX ORDER — METOPROLOL TARTRATE 25 MG/1
25 TABLET, FILM COATED ORAL
Qty: 60 | Refills: 3 | Status: ACTIVE | COMMUNITY
Start: 2019-10-12 | End: 1900-01-01

## 2021-10-13 ENCOUNTER — RX RENEWAL (OUTPATIENT)
Age: 86
End: 2021-10-13

## 2021-10-21 ENCOUNTER — RX RENEWAL (OUTPATIENT)
Age: 86
End: 2021-10-21

## 2021-10-22 NOTE — ED ADULT NURSE NOTE - CAS DISCH ACCOMP BY
Phone conversation with patient to schedule follow-up appointment with primary care provider following ED visit on 10/21/21, patient needs to check his schedule at work and will call back.     Transport/Self

## 2021-10-31 ENCOUNTER — RX RENEWAL (OUTPATIENT)
Age: 86
End: 2021-10-31

## 2021-12-07 ENCOUNTER — APPOINTMENT (OUTPATIENT)
Dept: FAMILY MEDICINE | Facility: HOME HEALTH | Age: 86
End: 2021-12-07
Payer: MEDICARE

## 2021-12-07 DIAGNOSIS — N18.30 CHRONIC KIDNEY DISEASE, STAGE 3 UNSPECIFIED: ICD-10-CM

## 2021-12-07 DIAGNOSIS — L03.031 CELLULITIS OF RIGHT TOE: ICD-10-CM

## 2021-12-07 DIAGNOSIS — E78.5 HYPERLIPIDEMIA, UNSPECIFIED: ICD-10-CM

## 2021-12-07 DIAGNOSIS — G30.9 ALZHEIMER'S DISEASE, UNSPECIFIED: ICD-10-CM

## 2021-12-07 DIAGNOSIS — H10.9 UNSPECIFIED CONJUNCTIVITIS: ICD-10-CM

## 2021-12-07 DIAGNOSIS — F02.80 ALZHEIMER'S DISEASE, UNSPECIFIED: ICD-10-CM

## 2021-12-07 DIAGNOSIS — E21.3 HYPERPARATHYROIDISM, UNSPECIFIED: ICD-10-CM

## 2021-12-07 DIAGNOSIS — N18.4 CHRONIC KIDNEY DISEASE, STAGE 4 (SEVERE): ICD-10-CM

## 2021-12-07 PROCEDURE — 99350 HOME/RES VST EST HIGH MDM 60: CPT | Mod: 25

## 2021-12-07 PROCEDURE — 90686 IIV4 VACC NO PRSV 0.5 ML IM: CPT

## 2021-12-07 PROCEDURE — G0008: CPT

## 2021-12-08 VITALS
HEART RATE: 85 BPM | SYSTOLIC BLOOD PRESSURE: 130 MMHG | RESPIRATION RATE: 16 BRPM | OXYGEN SATURATION: 93 % | TEMPERATURE: 97.7 F | DIASTOLIC BLOOD PRESSURE: 70 MMHG

## 2021-12-08 PROBLEM — N18.4 CHRONIC KIDNEY DISEASE (CKD), STAGE IV (SEVERE): Status: ACTIVE | Noted: 2021-12-08

## 2021-12-08 PROBLEM — H10.9 CONJUNCTIVITIS: Status: RESOLVED | Noted: 2021-12-08 | Resolved: 2022-01-07

## 2021-12-08 PROBLEM — L03.031 PARONYCHIA OF TOE OF RIGHT FOOT: Status: ACTIVE | Noted: 2021-12-08

## 2021-12-08 RX ORDER — TOBRAMYCIN 3 MG/ML
0.3 SOLUTION/ DROPS OPHTHALMIC 3 TIMES DAILY
Qty: 1 | Refills: 1 | Status: ACTIVE | COMMUNITY
Start: 2021-12-08 | End: 1900-01-01

## 2021-12-08 RX ORDER — AMOXICILLIN 500 MG/1
500 TABLET, FILM COATED ORAL
Qty: 14 | Refills: 1 | Status: ACTIVE | COMMUNITY
Start: 2021-12-08 | End: 1900-01-01

## 2021-12-08 NOTE — COUNSELING
[Fall prevention counseling provided] : Fall prevention counseling provided [Adequate lighting] : Adequate lighting [No throw rugs] : No throw rugs [Use proper foot wear] : Use proper foot wear [Use recommended devices] : Use recommended devices [AUDIT-C Screening administered and reviewed] : AUDIT-C Screening administered and reviewed [Limited decision making ability] : Limited decision making ability [FreeTextEntry1] : Discussed with patient's daughter and formal caretaker both present. [de-identified] : Patient is totally dependent upon caretaker.

## 2021-12-08 NOTE — HEALTH RISK ASSESSMENT
[No] : In the past 12 months have you used drugs other than those required for medical reasons? No [Any fall with injury in past year] : Patient reported fall with injury in the past year [Medical reason not done] : Medical reason not done [Language] : difficulty with language [Behavior] : difficulty with behavior [Learning/Retaining New Information] : difficulty learning/retaining new information [Handling Complex Tasks] : difficulty handling complex tasks [Reasoning] : difficulty with reasoning [Spatial Ability and Orientation] : difficulty with spatial ability and orientation [With Family] : lives with family [Retired] : retired [] :  [Feels Safe at Home] : Feels safe at home [Some assistance needed] : feeding [Full assistance needed] : managing finances [With Patient/Caregiver] : , with patient/caregiver [Reviewed no changes] : Reviewed, no changes [Designated Healthcare Proxy] : Designated healthcare proxy [Name: ___] : Health Care Proxy's Name: [unfilled]  [Relationship: ___] : Relationship: [unfilled] [DNR] : DNR [DNI] : DNI [I will adhere to the patient's wishes.] : I will adhere to the patient's wishes. [] : No [Audit-CScore] : 0 [de-identified] : Patient has advanced Alzheimer's basically nonverbal. [Change in mental status noted] : No change in mental status noted [Sexually Active] : not sexually active [High Risk Behavior] : no high risk behavior [de-identified] : Patient is totally dependent upon caregiver [AdvancecareDate] : 12/21

## 2021-12-08 NOTE — REVIEW OF SYSTEMS
[Fatigue] : fatigue [Discharge] : discharge [Redness] : redness [Confusion] : confusion [Unsteady Walk] : ataxia [Memory Loss] : memory loss [Negative] : Heme/Lymph [Headache] : no headache [Dizziness] : no dizziness [Fainting] : no fainting [de-identified] : Great toe ingrown toenail right foot

## 2021-12-08 NOTE — PHYSICAL EXAM
[No Acute Distress] : no acute distress [Well Nourished] : well nourished [Well Developed] : well developed [Well-Appearing] : well-appearing [Normal Outer Ear/Nose] : the outer ears and nose were normal in appearance [Normal Oropharynx] : the oropharynx was normal [No JVD] : no jugular venous distention [Supple] : supple [No Lymphadenopathy] : no lymphadenopathy [Thyroid Normal, No Nodules] : the thyroid was normal and there were no nodules present [No Respiratory Distress] : no respiratory distress  [Clear to Auscultation] : lungs were clear to auscultation bilaterally [No Accessory Muscle Use] : no accessory muscle use [Normal Rate] : normal rate  [Regular Rhythm] : with a regular rhythm [Normal S1, S2] : normal S1 and S2 [No Murmur] : no murmur heard [No Carotid Bruits] : no carotid bruits [No Abdominal Bruit] : a ~M bruit was not heard ~T in the abdomen [No Varicosities] : no varicosities [Pedal Pulses Present] : the pedal pulses are present [No Edema] : there was no peripheral edema [No Extremity Clubbing/Cyanosis] : no extremity clubbing/cyanosis [No Palpable Aorta] : no palpable aorta [Soft] : abdomen soft [Non Tender] : non-tender [Non-distended] : non-distended [No Masses] : no abdominal mass palpated [No HSM] : no HSM [Normal Bowel Sounds] : normal bowel sounds [Normal Posterior Cervical Nodes] : no posterior cervical lymphadenopathy [Normal Anterior Cervical Nodes] : no anterior cervical lymphadenopathy [No CVA Tenderness] : no CVA  tenderness [No Spinal Tenderness] : no spinal tenderness [No Joint Swelling] : no joint swelling [No Rash] : no rash [de-identified] : Conjunctival reddened, presently crusting is not visible patient's daughter cleaned patient's eyes [de-identified] : Shuffling gait, expressionless [de-identified] : Paronychia right great toe [de-identified] : Fine tremor. No cogwheel rigidity. No pill-rolling,Unsteady gait [de-identified] : advanced Alzheimer,no change

## 2021-12-08 NOTE — ASSESSMENT
[FreeTextEntry1] : Assessment and plan:\par \par 1. Conjunctivitis: Tobramycin has been prescribed 1 drop in both eyes 3 times a day instructed family and caretaker to clean both eyes with warm water and then place drops.\par \par 2. Paronychia patient is seen on a regular basis by podiatry recently had her toenails cut I will treat with amoxicillin 500 mg twice a day, warm soaks and if symptoms persist reevaluation by podiatry.\par \par 3. Advanced Alzheimer's continue present medical management without change\par \par 4. Insomnia and anxiety mirtazapine 45 mg at bedtime.\par \par 5. Hypertension good blood pressure control with metoprolol tartrate 25 mg twice a day.\par \par 6. Reviewed most recent blood work with family patient does have advanced renal insufficiency stage IV I discussed with family to keep the patient well-hydrated try to get at least 64 ounces of fluids daily into the patient but they do inform me that is an extremely difficult task.\par \par Total time spent 60 minutes. Counseling and coordination of care regarding conjunctivitis, paronychia and also fall prevention. At this visit influenza vaccine administered.

## 2021-12-20 ENCOUNTER — RX RENEWAL (OUTPATIENT)
Age: 86
End: 2021-12-20

## 2021-12-30 NOTE — DISCHARGE NOTE NURSING/CASE MANAGEMENT/SOCIAL WORK - NURSING SECTION COMPLETE
LMOM requesting pt to call back at earliest convenience. Patient/Caregiver provided printed discharge information.

## 2022-01-05 ENCOUNTER — NON-APPOINTMENT (OUTPATIENT)
Age: 87
End: 2022-01-05

## 2022-06-21 NOTE — SWALLOW BEDSIDE ASSESSMENT ADULT - SLP PRECAUTIONS/LIMITATIONS: HEARING
within functional limits
Alert-The patient is alert, awake and responds to voice. The patient is oriented to time, place, and person. The triage nurse is able to obtain subjective information.

## 2022-10-02 NOTE — PROGRESS NOTE ADULT - PROBLEM/PLAN-4
DISPLAY PLAN FREE TEXT
[facility]

## 2023-03-17 NOTE — PROGRESS NOTE ADULT - PROBLEM SELECTOR PROBLEM 6
[Patient Intake Form Reviewed] : Patient intake form was reviewed
Prophylactic measure

## 2023-07-10 NOTE — ED ADULT NURSE NOTE - CAS EDN DISCHARGE ASSESSMENT
Ketoconazole Counseling:   Patient counseled regarding improving absorption with orange juice.  Adverse effects include but are not limited to breast enlargement, headache, diarrhea, nausea, upset stomach, liver function test abnormalities, taste disturbance, and stomach pain.  There is a rare possibility of liver failure that can occur when taking ketoconazole. The patient understands that monitoring of LFTs may be required, especially at baseline. The patient verbalized understanding of the proper use and possible adverse effects of ketoconazole.  All of the patient's questions and concerns were addressed. Patient baseline mental status
